# Patient Record
Sex: FEMALE | Race: WHITE | NOT HISPANIC OR LATINO
[De-identification: names, ages, dates, MRNs, and addresses within clinical notes are randomized per-mention and may not be internally consistent; named-entity substitution may affect disease eponyms.]

---

## 2017-12-21 ENCOUNTER — ASOB RESULT (OUTPATIENT)
Age: 52
End: 2017-12-21

## 2017-12-21 ENCOUNTER — APPOINTMENT (OUTPATIENT)
Dept: MATERNAL FETAL MEDICINE | Facility: CLINIC | Age: 52
End: 2017-12-21
Payer: COMMERCIAL

## 2017-12-21 PROCEDURE — 99241 OFFICE CONSULTATION NEW/ESTAB PATIENT 15 MIN: CPT

## 2018-01-08 ENCOUNTER — ASOB RESULT (OUTPATIENT)
Age: 53
End: 2018-01-08

## 2018-01-08 ENCOUNTER — APPOINTMENT (OUTPATIENT)
Dept: MATERNAL FETAL MEDICINE | Facility: CLINIC | Age: 53
End: 2018-01-08
Payer: COMMERCIAL

## 2018-01-08 PROCEDURE — 99241 OFFICE CONSULTATION NEW/ESTAB PATIENT 15 MIN: CPT

## 2018-01-29 ENCOUNTER — OUTPATIENT (OUTPATIENT)
Dept: OUTPATIENT SERVICES | Facility: HOSPITAL | Age: 53
LOS: 1 days | End: 2018-01-29
Payer: COMMERCIAL

## 2018-01-29 ENCOUNTER — TRANSCRIPTION ENCOUNTER (OUTPATIENT)
Age: 53
End: 2018-01-29

## 2018-01-29 DIAGNOSIS — Z12.11 ENCOUNTER FOR SCREENING FOR MALIGNANT NEOPLASM OF COLON: ICD-10-CM

## 2018-01-29 LAB — HCG UR QL: NEGATIVE — SIGNIFICANT CHANGE UP

## 2018-01-29 PROCEDURE — G0121: CPT

## 2018-01-29 PROCEDURE — 81025 URINE PREGNANCY TEST: CPT

## 2018-02-26 ENCOUNTER — OUTPATIENT (OUTPATIENT)
Dept: OUTPATIENT SERVICES | Facility: HOSPITAL | Age: 53
LOS: 1 days | End: 2018-02-26
Payer: COMMERCIAL

## 2018-02-26 VITALS
RESPIRATION RATE: 16 BRPM | SYSTOLIC BLOOD PRESSURE: 137 MMHG | TEMPERATURE: 99 F | HEART RATE: 73 BPM | DIASTOLIC BLOOD PRESSURE: 91 MMHG | WEIGHT: 293 LBS

## 2018-02-26 DIAGNOSIS — Z41.9 ENCOUNTER FOR PROCEDURE FOR PURPOSES OTHER THAN REMEDYING HEALTH STATE, UNSPECIFIED: Chronic | ICD-10-CM

## 2018-02-26 DIAGNOSIS — Z01.818 ENCOUNTER FOR OTHER PREPROCEDURAL EXAMINATION: ICD-10-CM

## 2018-02-26 DIAGNOSIS — N84.0 POLYP OF CORPUS UTERI: ICD-10-CM

## 2018-02-26 DIAGNOSIS — Z98.890 OTHER SPECIFIED POSTPROCEDURAL STATES: Chronic | ICD-10-CM

## 2018-02-26 DIAGNOSIS — Z98.891 HISTORY OF UTERINE SCAR FROM PREVIOUS SURGERY: Chronic | ICD-10-CM

## 2018-02-26 LAB
ALBUMIN SERPL ELPH-MCNC: 3.5 G/DL — SIGNIFICANT CHANGE UP (ref 3.3–5)
ALP SERPL-CCNC: 85 U/L — SIGNIFICANT CHANGE UP (ref 40–120)
ALT FLD-CCNC: 25 U/L — SIGNIFICANT CHANGE UP (ref 12–78)
ANION GAP SERPL CALC-SCNC: 6 MMOL/L — SIGNIFICANT CHANGE UP (ref 5–17)
AST SERPL-CCNC: 15 U/L — SIGNIFICANT CHANGE UP (ref 15–37)
BILIRUB SERPL-MCNC: 0.3 MG/DL — SIGNIFICANT CHANGE UP (ref 0.2–1.2)
BUN SERPL-MCNC: 16 MG/DL — SIGNIFICANT CHANGE UP (ref 7–23)
CALCIUM SERPL-MCNC: 9.2 MG/DL — SIGNIFICANT CHANGE UP (ref 8.5–10.1)
CHLORIDE SERPL-SCNC: 106 MMOL/L — SIGNIFICANT CHANGE UP (ref 96–108)
CO2 SERPL-SCNC: 29 MMOL/L — SIGNIFICANT CHANGE UP (ref 22–31)
CREAT SERPL-MCNC: 0.73 MG/DL — SIGNIFICANT CHANGE UP (ref 0.5–1.3)
GLUCOSE SERPL-MCNC: 106 MG/DL — HIGH (ref 70–99)
HBA1C BLD-MCNC: 5.6 % — SIGNIFICANT CHANGE UP (ref 4–5.6)
HCG UR QL: NEGATIVE — SIGNIFICANT CHANGE UP
HCT VFR BLD CALC: 43.2 % — SIGNIFICANT CHANGE UP (ref 34.5–45)
HGB BLD-MCNC: 13.8 G/DL — SIGNIFICANT CHANGE UP (ref 11.5–15.5)
MCHC RBC-ENTMCNC: 28.7 PG — SIGNIFICANT CHANGE UP (ref 27–34)
MCHC RBC-ENTMCNC: 31.9 GM/DL — LOW (ref 32–36)
MCV RBC AUTO: 90 FL — SIGNIFICANT CHANGE UP (ref 80–100)
PLATELET # BLD AUTO: 324 K/UL — SIGNIFICANT CHANGE UP (ref 150–400)
POTASSIUM SERPL-MCNC: 5 MMOL/L — SIGNIFICANT CHANGE UP (ref 3.5–5.3)
POTASSIUM SERPL-SCNC: 5 MMOL/L — SIGNIFICANT CHANGE UP (ref 3.5–5.3)
PROT SERPL-MCNC: 7.6 G/DL — SIGNIFICANT CHANGE UP (ref 6–8.3)
RBC # BLD: 4.8 M/UL — SIGNIFICANT CHANGE UP (ref 3.8–5.2)
RBC # FLD: 13.8 % — SIGNIFICANT CHANGE UP (ref 10.3–14.5)
SODIUM SERPL-SCNC: 141 MMOL/L — SIGNIFICANT CHANGE UP (ref 135–145)
WBC # BLD: 9.5 K/UL — SIGNIFICANT CHANGE UP (ref 3.8–10.5)
WBC # FLD AUTO: 9.5 K/UL — SIGNIFICANT CHANGE UP (ref 3.8–10.5)

## 2018-02-26 PROCEDURE — 86901 BLOOD TYPING SEROLOGIC RH(D): CPT

## 2018-02-26 PROCEDURE — 85027 COMPLETE CBC AUTOMATED: CPT

## 2018-02-26 PROCEDURE — 81025 URINE PREGNANCY TEST: CPT

## 2018-02-26 PROCEDURE — 93010 ELECTROCARDIOGRAM REPORT: CPT | Mod: NC

## 2018-02-26 PROCEDURE — 86900 BLOOD TYPING SEROLOGIC ABO: CPT

## 2018-02-26 PROCEDURE — G0463: CPT

## 2018-02-26 PROCEDURE — 80053 COMPREHEN METABOLIC PANEL: CPT

## 2018-02-26 PROCEDURE — 93005 ELECTROCARDIOGRAM TRACING: CPT

## 2018-02-26 PROCEDURE — 86850 RBC ANTIBODY SCREEN: CPT

## 2018-02-26 PROCEDURE — 83036 HEMOGLOBIN GLYCOSYLATED A1C: CPT

## 2018-02-26 RX ORDER — METFORMIN HYDROCHLORIDE 850 MG/1
0 TABLET ORAL
Qty: 180 | Refills: 0 | COMMUNITY

## 2018-02-26 RX ORDER — LEVOTHYROXINE SODIUM 125 MCG
0 TABLET ORAL
Qty: 90 | Refills: 0 | COMMUNITY

## 2018-02-26 NOTE — H&P PST ADULT - PSH
Elective surgery  (Right big toe joint replacement, )  History of colonoscopy  (2018)  S/P  section  ()

## 2018-02-26 NOTE — H&P PST ADULT - FAMILY HISTORY
Mother  Still living? No  Family history of ovarian cancer, Age at diagnosis: Age Unknown     Father  Still living? No  MI (myocardial infarction), Age at diagnosis: Age Unknown

## 2018-02-26 NOTE — H&P PST ADULT - NEGATIVE CARDIOVASCULAR SYMPTOMS
no chest pain/no paroxysmal nocturnal dyspnea/no orthopnea/no peripheral edema/no palpitations/no dyspnea on exertion/no claudication

## 2018-02-26 NOTE — H&P PST ADULT - LYMPHATIC
supraclavicular L/anterior cervical R/posterior cervical R/posterior cervical L/anterior cervical L/supraclavicular R

## 2018-02-26 NOTE — H&P PST ADULT - HISTORY OF PRESENT ILLNESS
52yo female with PMH of hypothyroidism and Type 2 DM here for PST. Pt complaining of vaginal staining for a day or two after having her monthly menses for the last year. Pt denies n/v/d, abdominal and pelvic pain. LMP - 2/5/18. Pt s/p sonogram "which showed polyps in uterus". Pt electing for dilation and curettage/hysteroscopy with myosure on 3/2/18. 54yo female with PMH of hypothyroidism and Prediabetes here for PST. Pt complaining of vaginal staining for a day or two after having her monthly menses for the last year. Pt denies n/v/d, abdominal and pelvic pain. LMP - 2/5/18. Pt s/p sonogram "which showed polyps in uterus". Pt electing for dilation and curettage/hysteroscopy with myosure on 3/2/18.

## 2018-02-26 NOTE — H&P PST ADULT - SKIN/BREAST COMMENTS
Pt with yeast rash on for the last 2 days. Pt with yeast rash on bilateral lower abdomen in between skin folds for the last 2 days. Pt using Clotrimazole ointment to affected areas. Instructed pt to inform PCP and surgeon regarding rash.

## 2018-02-26 NOTE — H&P PST ADULT - ATTENDING COMMENTS
See my Pre Op Note.     A: irreg bleeding, intrauterine polypoid lesion    P: For Hysteroscoy, myosure, and D and C.

## 2018-02-26 NOTE — H&P PST ADULT - GASTROINTESTINAL DETAILS
no bruit/no rebound tenderness/no rigidity/soft/no guarding/no masses palpable/bowel sounds normal/nontender/normal/no distention/no organomegaly

## 2018-02-26 NOTE — H&P PST ADULT - PROBLEM SELECTOR PLAN 2
Medical clearance needed. CBC, Comprehensive panel, T&S, UCG and EKG ordered. Pre-op instructions given and pt verbalized understanding. Medical clearance needed. CBC, Comprehensive panel, T&S, HgbA1c, UCG and EKG ordered. Pre-op instructions given and pt verbalized understanding. Medical clearance needed. CBC, Comprehensive panel, T&S, HgbA1c, UCG and EKG ordered. Pre-op instructions given and pt verbalized understanding.    Pt with yeast rash on bilateral lower abdomen in between skin folds for the last 2 days. Pt using Clotrimazole ointment to affected areas. Instructed pt to inform PCP and surgeon regarding rash. Pt verbalized understanding.

## 2018-02-26 NOTE — H&P PST ADULT - PMH
Hypothyroidism    OA (osteoarthritis)    Prediabetes Hypothyroidism    OA (osteoarthritis)    Polyp of corpus uteri    Prediabetes

## 2018-02-26 NOTE — H&P PST ADULT - NSANTHOSAYNRD_GEN_A_CORE
No. ANISA screening performed.  STOP BANG Legend: 0-2 = LOW Risk; 3-4 = INTERMEDIATE Risk; 5-8 = HIGH Risk

## 2018-02-28 RX ORDER — SODIUM CHLORIDE 9 MG/ML
1000 INJECTION, SOLUTION INTRAVENOUS
Qty: 0 | Refills: 0 | Status: DISCONTINUED | OUTPATIENT
Start: 2018-03-02 | End: 2018-03-02

## 2018-03-02 ENCOUNTER — TRANSCRIPTION ENCOUNTER (OUTPATIENT)
Age: 53
End: 2018-03-02

## 2018-03-02 ENCOUNTER — OUTPATIENT (OUTPATIENT)
Dept: OUTPATIENT SERVICES | Facility: HOSPITAL | Age: 53
LOS: 1 days | End: 2018-03-02
Payer: COMMERCIAL

## 2018-03-02 ENCOUNTER — RESULT REVIEW (OUTPATIENT)
Age: 53
End: 2018-03-02

## 2018-03-02 VITALS
SYSTOLIC BLOOD PRESSURE: 140 MMHG | HEIGHT: 65 IN | HEART RATE: 84 BPM | WEIGHT: 293 LBS | TEMPERATURE: 98 F | DIASTOLIC BLOOD PRESSURE: 79 MMHG | RESPIRATION RATE: 16 BRPM | OXYGEN SATURATION: 98 %

## 2018-03-02 VITALS
OXYGEN SATURATION: 98 % | RESPIRATION RATE: 16 BRPM | HEART RATE: 75 BPM | SYSTOLIC BLOOD PRESSURE: 132 MMHG | DIASTOLIC BLOOD PRESSURE: 80 MMHG | TEMPERATURE: 98 F

## 2018-03-02 DIAGNOSIS — Z98.890 OTHER SPECIFIED POSTPROCEDURAL STATES: Chronic | ICD-10-CM

## 2018-03-02 DIAGNOSIS — N84.0 POLYP OF CORPUS UTERI: ICD-10-CM

## 2018-03-02 DIAGNOSIS — Z41.9 ENCOUNTER FOR PROCEDURE FOR PURPOSES OTHER THAN REMEDYING HEALTH STATE, UNSPECIFIED: Chronic | ICD-10-CM

## 2018-03-02 DIAGNOSIS — Z98.891 HISTORY OF UTERINE SCAR FROM PREVIOUS SURGERY: Chronic | ICD-10-CM

## 2018-03-02 PROCEDURE — 58558 HYSTEROSCOPY BIOPSY: CPT

## 2018-03-02 PROCEDURE — 88305 TISSUE EXAM BY PATHOLOGIST: CPT | Mod: 26

## 2018-03-02 PROCEDURE — 88305 TISSUE EXAM BY PATHOLOGIST: CPT

## 2018-03-02 RX ORDER — HYDROMORPHONE HYDROCHLORIDE 2 MG/ML
0.5 INJECTION INTRAMUSCULAR; INTRAVENOUS; SUBCUTANEOUS
Qty: 0 | Refills: 0 | Status: DISCONTINUED | OUTPATIENT
Start: 2018-03-02 | End: 2018-03-02

## 2018-03-02 RX ORDER — SODIUM CHLORIDE 9 MG/ML
1000 INJECTION, SOLUTION INTRAVENOUS
Qty: 0 | Refills: 0 | Status: DISCONTINUED | OUTPATIENT
Start: 2018-03-02 | End: 2018-03-02

## 2018-03-02 RX ADMIN — SODIUM CHLORIDE 100 MILLILITER(S): 9 INJECTION, SOLUTION INTRAVENOUS at 11:11

## 2018-03-02 RX ADMIN — SODIUM CHLORIDE 75 MILLILITER(S): 9 INJECTION, SOLUTION INTRAVENOUS at 08:52

## 2018-03-02 NOTE — BRIEF OPERATIVE NOTE - PROCEDURE
<<-----Click on this checkbox to enter Procedure Hysteroscopy with dilation and curettage of uterus using MyoSure device  03/02/2018    Active  [id number]

## 2018-03-02 NOTE — ASU DISCHARGE PLAN (ADULT/PEDIATRIC). - MEDICATION SUMMARY - MEDICATIONS TO TAKE
I will START or STAY ON the medications listed below when I get home from the hospital:    Advil 200 mg oral tablet  -- 1 tab(s) by mouth every 6 hours, As Needed  -- Indication: For home meds    METFORMIN HCL  MG TABLET  -- orally once a day after dinner.   -- Indication: For home meds    clotrimazole 1% topical cream  -- Apply on skin to affected area 2 times a day  -- Indication: For home meds    METHOCARBAMOL 750 MG TABLET  -- Indication: For home meds    LEVOTHYROXINE 175 MCG TABLET  -- orally once a day  -- Indication: For home meds    Calcium 600+D oral tablet  -- orally once a day  -- Indication: For home meds    Multiple Vitamins oral tablet  -- 1 tab(s) by mouth once a day  -- Indication: For home meds    Vitamin C 500 mg oral tablet  -- 1 tab(s) by mouth once a day  -- Indication: For home meds

## 2018-03-02 NOTE — BRIEF OPERATIVE NOTE - OPERATION/FINDINGS
EUA revealed very long vagina and large rectocele, about 2nd-3rd degree, but large in size, perhaps 4 X 4 cm. This interfered, along with the length of the vagina, in visualization. Slowly progressing, we had to swap out a tower due to a non functioning camera/similar, thus providing delay. The case showed a 1.5 cm polyp near the anterior aspect of the fundus near the right ostium; and another polypoid lesion near the right ostium with ? of abnormal vessels, removed with Myosure, as was the other polyp. Another cx polyp was removed as well. ECC small. EMC small. All counts correct x 3. No complications noted. EBL 20 cc. fluids with 50 cc mismatch.

## 2018-03-02 NOTE — ASU DISCHARGE PLAN (ADULT/PEDIATRIC). - NOTIFY
Bleeding that does not stop/Increased Irritability or Sluggishness/Persistent Nausea and Vomiting/Numbness, tingling/Inability to Tolerate Liquids or Foods/Excessive Diarrhea/Unable to Urinate Persistent Nausea and Vomiting/Unable to Urinate/Bleeding that does not stop/Increased Irritability or Sluggishness/GYN Fever>100.4/Inability to Tolerate Liquids or Foods

## 2018-03-02 NOTE — ASU DISCHARGE PLAN (ADULT/PEDIATRIC). - PAIN
Advil 3 tabs every 6 hours for cramps as needed. Advil 3 tabs every 6 hours for cramps as needed./n/a

## 2018-03-05 LAB — SURGICAL PATHOLOGY FINAL REPORT - CH: SIGNIFICANT CHANGE UP

## 2019-09-27 ENCOUNTER — OUTPATIENT (OUTPATIENT)
Dept: OUTPATIENT SERVICES | Facility: HOSPITAL | Age: 54
LOS: 1 days | End: 2019-09-27
Payer: COMMERCIAL

## 2019-09-27 DIAGNOSIS — Z98.891 HISTORY OF UTERINE SCAR FROM PREVIOUS SURGERY: Chronic | ICD-10-CM

## 2019-09-27 DIAGNOSIS — Z41.9 ENCOUNTER FOR PROCEDURE FOR PURPOSES OTHER THAN REMEDYING HEALTH STATE, UNSPECIFIED: Chronic | ICD-10-CM

## 2019-09-27 DIAGNOSIS — Z98.890 OTHER SPECIFIED POSTPROCEDURAL STATES: Chronic | ICD-10-CM

## 2019-09-27 PROCEDURE — 93010 ELECTROCARDIOGRAM REPORT: CPT

## 2019-10-03 PROBLEM — M19.90 UNSPECIFIED OSTEOARTHRITIS, UNSPECIFIED SITE: Chronic | Status: ACTIVE | Noted: 2018-02-26

## 2019-10-03 PROBLEM — N84.0 POLYP OF CORPUS UTERI: Chronic | Status: ACTIVE | Noted: 2018-02-26

## 2019-10-03 PROBLEM — R73.03 PREDIABETES: Chronic | Status: ACTIVE | Noted: 2018-02-26

## 2019-10-03 PROBLEM — E03.9 HYPOTHYROIDISM, UNSPECIFIED: Chronic | Status: ACTIVE | Noted: 2018-02-26

## 2019-10-16 ENCOUNTER — NON-APPOINTMENT (OUTPATIENT)
Age: 54
End: 2019-10-16

## 2019-10-16 ENCOUNTER — APPOINTMENT (OUTPATIENT)
Dept: CARDIOLOGY | Facility: CLINIC | Age: 54
End: 2019-10-16
Payer: COMMERCIAL

## 2019-10-16 VITALS
WEIGHT: 210 LBS | HEART RATE: 61 BPM | OXYGEN SATURATION: 100 % | DIASTOLIC BLOOD PRESSURE: 85 MMHG | HEIGHT: 64 IN | BODY MASS INDEX: 35.85 KG/M2 | SYSTOLIC BLOOD PRESSURE: 135 MMHG

## 2019-10-16 DIAGNOSIS — R94.31 ABNORMAL ELECTROCARDIOGRAM [ECG] [EKG]: ICD-10-CM

## 2019-10-16 DIAGNOSIS — Z86.79 PERSONAL HISTORY OF OTHER DISEASES OF THE CIRCULATORY SYSTEM: ICD-10-CM

## 2019-10-16 DIAGNOSIS — G47.33 OBSTRUCTIVE SLEEP APNEA (ADULT) (PEDIATRIC): ICD-10-CM

## 2019-10-16 DIAGNOSIS — Z82.49 FAMILY HISTORY OF ISCHEMIC HEART DISEASE AND OTHER DISEASES OF THE CIRCULATORY SYSTEM: ICD-10-CM

## 2019-10-16 DIAGNOSIS — N84.1 POLYP OF CERVIX UTERI: ICD-10-CM

## 2019-10-16 DIAGNOSIS — Z80.41 FAMILY HISTORY OF MALIGNANT NEOPLASM OF OVARY: ICD-10-CM

## 2019-10-16 PROCEDURE — 93000 ELECTROCARDIOGRAM COMPLETE: CPT

## 2019-10-16 PROCEDURE — 99244 OFF/OP CNSLTJ NEW/EST MOD 40: CPT

## 2019-10-16 NOTE — PHYSICAL EXAM
[General Appearance - Well Developed] : well developed [Normal Appearance] : normal appearance [No Deformities] : no deformities [General Appearance - Well Nourished] : well nourished [Well Groomed] : well groomed [General Appearance - In No Acute Distress] : no acute distress [Normal Conjunctiva] : the conjunctiva exhibited no abnormalities [Normal Oral Mucosa] : normal oral mucosa [No Oral Pallor] : no oral pallor [Eyelids - No Xanthelasma] : the eyelids demonstrated no xanthelasmas [Normal Jugular Venous V Waves Present] : normal jugular venous V waves present [Normal Jugular Venous A Waves Present] : normal jugular venous A waves present [No Oral Cyanosis] : no oral cyanosis [No Jugular Venous Myles A Waves] : no jugular venous myles A waves [Normal Rate] : normal [Normal S2] : normal S2 [Normal S1] : normal S1 [S3] : no S3 [S4] : no S4 [Right Carotid Bruit] : no bruit heard over the right carotid [Left Carotid Bruit] : no bruit heard over the left carotid [Right Femoral Bruit] : no bruit heard over the right femoral artery [Left Femoral Bruit] : no bruit heard over the left femoral artery [2+] : left 2+ [No Pitting Edema] : no pitting edema present [No Abnormalities] : the abdominal aorta was not enlarged and no bruit was heard [Respiration, Rhythm And Depth] : normal respiratory rhythm and effort [Abdomen Soft] : soft [Exaggerated Use Of Accessory Muscles For Inspiration] : no accessory muscle use [Auscultation Breath Sounds / Voice Sounds] : lungs were clear to auscultation bilaterally [Abdomen Tenderness] : non-tender [Abdomen Mass (___ Cm)] : no abdominal mass palpated [Nail Clubbing] : no clubbing of the fingernails [Cyanosis, Localized] : no localized cyanosis [FreeTextEntry1] : with cane [Petechial Hemorrhages (___cm)] : no petechial hemorrhages [Skin Color & Pigmentation] : normal skin color and pigmentation [No Venous Stasis] : no venous stasis [] : no rash [No Xanthoma] : no  xanthoma was observed [Skin Lesions] : no skin lesions [No Skin Ulcers] : no skin ulcer [Mood] : the mood was normal [Affect] : the affect was normal [Oriented To Time, Place, And Person] : oriented to person, place, and time [No Anxiety] : not feeling anxious

## 2019-10-16 NOTE — DISCUSSION/SUMMARY
[FreeTextEntry1] : Bernie is a 54 year old female here for evaluation.\par \par She has no worrisome cardiac symptoms at this time, or history of coronary artery disease. She had a pharmacologic nuclear stress test in 2/2018 that was unremarkable.\par \par Her blood pressure is near goal. Her physical exam is unremarkable. Her EKG demonstrates a sinus rhythm without obvious ischemia or chamber enlargement. At current time, there is no evidence of acute ischemia, decompensated heart failure, critical valvular disease, or significant arrhythmias. There is no cardiac contraindication to proceeding with left hip surgery. No further testing is necessary. Routine cardiac monitoring is recommended. We also discussed the importance of diet and exercise, to reduce her cholesterol and overall cardiovascular risk. She will followup with me in 3-6 months time.

## 2019-10-21 ENCOUNTER — INPATIENT (INPATIENT)
Facility: HOSPITAL | Age: 54
LOS: 1 days | Discharge: HOME HEALTH | End: 2019-10-23
Payer: COMMERCIAL

## 2019-10-21 ENCOUNTER — OUTPATIENT (OUTPATIENT)
Dept: OUTPATIENT SERVICES | Facility: HOSPITAL | Age: 54
LOS: 1 days | End: 2019-10-21

## 2019-10-21 DIAGNOSIS — Z98.891 HISTORY OF UTERINE SCAR FROM PREVIOUS SURGERY: Chronic | ICD-10-CM

## 2019-10-21 DIAGNOSIS — Z98.890 OTHER SPECIFIED POSTPROCEDURAL STATES: Chronic | ICD-10-CM

## 2019-10-21 DIAGNOSIS — Z41.9 ENCOUNTER FOR PROCEDURE FOR PURPOSES OTHER THAN REMEDYING HEALTH STATE, UNSPECIFIED: Chronic | ICD-10-CM

## 2019-10-21 PROCEDURE — 73501 X-RAY EXAM HIP UNI 1 VIEW: CPT | Mod: 26,LT

## 2019-10-22 ENCOUNTER — OUTPATIENT (OUTPATIENT)
Dept: OUTPATIENT SERVICES | Facility: HOSPITAL | Age: 54
LOS: 1 days | End: 2019-10-22

## 2019-10-22 DIAGNOSIS — Z41.9 ENCOUNTER FOR PROCEDURE FOR PURPOSES OTHER THAN REMEDYING HEALTH STATE, UNSPECIFIED: Chronic | ICD-10-CM

## 2019-10-22 DIAGNOSIS — Z98.891 HISTORY OF UTERINE SCAR FROM PREVIOUS SURGERY: Chronic | ICD-10-CM

## 2019-10-22 DIAGNOSIS — Z98.890 OTHER SPECIFIED POSTPROCEDURAL STATES: Chronic | ICD-10-CM

## 2019-10-23 ENCOUNTER — OUTPATIENT (OUTPATIENT)
Dept: OUTPATIENT SERVICES | Facility: HOSPITAL | Age: 54
LOS: 1 days | End: 2019-10-23

## 2019-10-23 DIAGNOSIS — Z98.891 HISTORY OF UTERINE SCAR FROM PREVIOUS SURGERY: Chronic | ICD-10-CM

## 2019-10-23 DIAGNOSIS — Z98.890 OTHER SPECIFIED POSTPROCEDURAL STATES: Chronic | ICD-10-CM

## 2019-10-23 DIAGNOSIS — Z41.9 ENCOUNTER FOR PROCEDURE FOR PURPOSES OTHER THAN REMEDYING HEALTH STATE, UNSPECIFIED: Chronic | ICD-10-CM

## 2021-04-01 NOTE — H&P PST ADULT - TEACHING/LEARNING DEVELOPMENTAL CONSIDERATIONS
Patient called back and left a voicemail and asked for it to NOT go to [facility] and to be sent through [facility] in [facility] instead 
Patient only has 1 pill left of premarin and is wondering if we could refill about a month supply to the [facility] on [address] in [place] and then send the rest of the refills [facility] 
none

## 2021-08-13 ENCOUNTER — EMERGENCY (EMERGENCY)
Facility: HOSPITAL | Age: 56
LOS: 1 days | Discharge: HOME | End: 2021-08-13
Attending: EMERGENCY MEDICINE | Admitting: EMERGENCY MEDICINE
Payer: COMMERCIAL

## 2021-08-13 VITALS
OXYGEN SATURATION: 98 % | HEIGHT: 65 IN | HEART RATE: 74 BPM | RESPIRATION RATE: 983 BRPM | WEIGHT: 229.94 LBS | TEMPERATURE: 98 F | SYSTOLIC BLOOD PRESSURE: 175 MMHG | DIASTOLIC BLOOD PRESSURE: 95 MMHG

## 2021-08-13 VITALS
HEART RATE: 66 BPM | RESPIRATION RATE: 15 BRPM | OXYGEN SATURATION: 99 % | SYSTOLIC BLOOD PRESSURE: 128 MMHG | TEMPERATURE: 98 F | DIASTOLIC BLOOD PRESSURE: 78 MMHG

## 2021-08-13 DIAGNOSIS — Z98.891 HISTORY OF UTERINE SCAR FROM PREVIOUS SURGERY: Chronic | ICD-10-CM

## 2021-08-13 DIAGNOSIS — Z98.890 OTHER SPECIFIED POSTPROCEDURAL STATES: Chronic | ICD-10-CM

## 2021-08-13 DIAGNOSIS — Z41.9 ENCOUNTER FOR PROCEDURE FOR PURPOSES OTHER THAN REMEDYING HEALTH STATE, UNSPECIFIED: Chronic | ICD-10-CM

## 2021-08-13 LAB
ALBUMIN SERPL ELPH-MCNC: 3.5 G/DL — SIGNIFICANT CHANGE UP (ref 3.3–5)
ALP SERPL-CCNC: 96 U/L — SIGNIFICANT CHANGE UP (ref 40–120)
ALT FLD-CCNC: 27 U/L — SIGNIFICANT CHANGE UP (ref 12–78)
ANION GAP SERPL CALC-SCNC: 8 MMOL/L — SIGNIFICANT CHANGE UP (ref 5–17)
APTT BLD: 29.2 SEC — SIGNIFICANT CHANGE UP (ref 27.5–35.5)
AST SERPL-CCNC: 29 U/L — SIGNIFICANT CHANGE UP (ref 15–37)
BASOPHILS # BLD AUTO: 0.05 K/UL — SIGNIFICANT CHANGE UP (ref 0–0.2)
BASOPHILS NFR BLD AUTO: 0.7 % — SIGNIFICANT CHANGE UP (ref 0–2)
BILIRUB SERPL-MCNC: 0.6 MG/DL — SIGNIFICANT CHANGE UP (ref 0.2–1.2)
BUN SERPL-MCNC: 15 MG/DL — SIGNIFICANT CHANGE UP (ref 7–23)
CALCIUM SERPL-MCNC: 9.2 MG/DL — SIGNIFICANT CHANGE UP (ref 8.5–10.1)
CHLORIDE SERPL-SCNC: 106 MMOL/L — SIGNIFICANT CHANGE UP (ref 96–108)
CO2 SERPL-SCNC: 27 MMOL/L — SIGNIFICANT CHANGE UP (ref 22–31)
CREAT SERPL-MCNC: 0.61 MG/DL — SIGNIFICANT CHANGE UP (ref 0.5–1.3)
EOSINOPHIL # BLD AUTO: 0.23 K/UL — SIGNIFICANT CHANGE UP (ref 0–0.5)
EOSINOPHIL NFR BLD AUTO: 3.2 % — SIGNIFICANT CHANGE UP (ref 0–6)
GLUCOSE SERPL-MCNC: 90 MG/DL — SIGNIFICANT CHANGE UP (ref 70–99)
HCT VFR BLD CALC: 42.7 % — SIGNIFICANT CHANGE UP (ref 34.5–45)
HGB BLD-MCNC: 13.8 G/DL — SIGNIFICANT CHANGE UP (ref 11.5–15.5)
IMM GRANULOCYTES NFR BLD AUTO: 0.4 % — SIGNIFICANT CHANGE UP (ref 0–1.5)
INR BLD: 0.96 RATIO — SIGNIFICANT CHANGE UP (ref 0.88–1.16)
LYMPHOCYTES # BLD AUTO: 2.05 K/UL — SIGNIFICANT CHANGE UP (ref 1–3.3)
LYMPHOCYTES # BLD AUTO: 28.2 % — SIGNIFICANT CHANGE UP (ref 13–44)
MCHC RBC-ENTMCNC: 30.2 PG — SIGNIFICANT CHANGE UP (ref 27–34)
MCHC RBC-ENTMCNC: 32.3 GM/DL — SIGNIFICANT CHANGE UP (ref 32–36)
MCV RBC AUTO: 93.4 FL — SIGNIFICANT CHANGE UP (ref 80–100)
MONOCYTES # BLD AUTO: 0.57 K/UL — SIGNIFICANT CHANGE UP (ref 0–0.9)
MONOCYTES NFR BLD AUTO: 7.8 % — SIGNIFICANT CHANGE UP (ref 2–14)
NEUTROPHILS # BLD AUTO: 4.34 K/UL — SIGNIFICANT CHANGE UP (ref 1.8–7.4)
NEUTROPHILS NFR BLD AUTO: 59.7 % — SIGNIFICANT CHANGE UP (ref 43–77)
NRBC # BLD: 0 /100 WBCS — SIGNIFICANT CHANGE UP (ref 0–0)
PLATELET # BLD AUTO: 260 K/UL — SIGNIFICANT CHANGE UP (ref 150–400)
POTASSIUM SERPL-MCNC: 4.3 MMOL/L — SIGNIFICANT CHANGE UP (ref 3.5–5.3)
POTASSIUM SERPL-SCNC: 4.3 MMOL/L — SIGNIFICANT CHANGE UP (ref 3.5–5.3)
PROT SERPL-MCNC: 7.7 G/DL — SIGNIFICANT CHANGE UP (ref 6–8.3)
PROTHROM AB SERPL-ACNC: 11.3 SEC — SIGNIFICANT CHANGE UP (ref 10.6–13.6)
RBC # BLD: 4.57 M/UL — SIGNIFICANT CHANGE UP (ref 3.8–5.2)
RBC # FLD: 14 % — SIGNIFICANT CHANGE UP (ref 10.3–14.5)
SARS-COV-2 RNA SPEC QL NAA+PROBE: SIGNIFICANT CHANGE UP
SODIUM SERPL-SCNC: 141 MMOL/L — SIGNIFICANT CHANGE UP (ref 135–145)
TROPONIN I SERPL-MCNC: <.015 NG/ML — SIGNIFICANT CHANGE UP (ref 0.01–0.04)
WBC # BLD: 7.27 K/UL — SIGNIFICANT CHANGE UP (ref 3.8–10.5)
WBC # FLD AUTO: 7.27 K/UL — SIGNIFICANT CHANGE UP (ref 3.8–10.5)

## 2021-08-13 PROCEDURE — U0003: CPT

## 2021-08-13 PROCEDURE — 93010 ELECTROCARDIOGRAM REPORT: CPT

## 2021-08-13 PROCEDURE — 70496 CT ANGIOGRAPHY HEAD: CPT | Mod: 26,MA

## 2021-08-13 PROCEDURE — 70498 CT ANGIOGRAPHY NECK: CPT | Mod: MA

## 2021-08-13 PROCEDURE — 85610 PROTHROMBIN TIME: CPT

## 2021-08-13 PROCEDURE — 93005 ELECTROCARDIOGRAM TRACING: CPT

## 2021-08-13 PROCEDURE — 70450 CT HEAD/BRAIN W/O DYE: CPT | Mod: MA

## 2021-08-13 PROCEDURE — 80053 COMPREHEN METABOLIC PANEL: CPT

## 2021-08-13 PROCEDURE — 82962 GLUCOSE BLOOD TEST: CPT

## 2021-08-13 PROCEDURE — 71045 X-RAY EXAM CHEST 1 VIEW: CPT | Mod: 26

## 2021-08-13 PROCEDURE — 85025 COMPLETE CBC W/AUTO DIFF WBC: CPT

## 2021-08-13 PROCEDURE — 99285 EMERGENCY DEPT VISIT HI MDM: CPT | Mod: 25

## 2021-08-13 PROCEDURE — 70496 CT ANGIOGRAPHY HEAD: CPT | Mod: MA

## 2021-08-13 PROCEDURE — 36415 COLL VENOUS BLD VENIPUNCTURE: CPT

## 2021-08-13 PROCEDURE — 84484 ASSAY OF TROPONIN QUANT: CPT

## 2021-08-13 PROCEDURE — 0042T: CPT

## 2021-08-13 PROCEDURE — 71045 X-RAY EXAM CHEST 1 VIEW: CPT

## 2021-08-13 PROCEDURE — 85730 THROMBOPLASTIN TIME PARTIAL: CPT

## 2021-08-13 PROCEDURE — 99285 EMERGENCY DEPT VISIT HI MDM: CPT

## 2021-08-13 PROCEDURE — 70498 CT ANGIOGRAPHY NECK: CPT | Mod: 26,MA

## 2021-08-13 PROCEDURE — U0005: CPT

## 2021-08-13 NOTE — ED ADULT NURSE NOTE - OBJECTIVE STATEMENT
Pt A&Ox4, ambulatory to ED c/o numbness and headache.  Pt states that she had a headache to the left side of her head this morning after some numbness to her forehead.  She had nausea and numbness to left arm.  Pt also had "difficulty focusing" with dizziness.  Pt states that last night her  told her she was "off" and "lethargic" and her daughter mentioned to her at one point that she looked "as if my eyes were going to roll back in my head."

## 2021-08-13 NOTE — ED ADULT NURSE NOTE - NSICDXPASTSURGICALHX_GEN_ALL_CORE_FT
PAST SURGICAL HISTORY:  Elective surgery (Right big toe joint replacement, )    History of colonoscopy (2018)    S/P  section ()

## 2021-08-13 NOTE — ED ADULT NURSE NOTE - CHPI ED NUR SYMPTOMS NEG
FAMILY PRACTICE HEALTH MAINTENANCE OFFICE VISIT  [facility] - [address]    NAME: Garo Kasper  AGE: 62 y o  SEX: female  : 1960     DATE: 2020    Assessment and Plan     1  Well adult exam    2  Mixed hyperlipidemia  -     Lipid Panel with Direct LDL reflex; Future    3  Benign essential hypertension  -     Comprehensive metabolic panel; Future    4  Hypothyroidism, unspecified type    5  Need for hepatitis C screening test  -     Hepatitis C antibody; Future    6  Need for vaccination  -     Zoster Vaccine Recombinant IM    7  Angiomyolipoma of both kidneys        · Patient Counseling:   · Nutrition: Stressed importance of a well balanced diet, moderation of sodium/saturated fat, caloric balance and sufficient intake of fiber  · Exercise: Stressed the importance of regular exercise with a goal of 150 minutes per week  · Dental Health: Discussed daily flossing and brushing and regular dental visits     · Immunizations reviewed: See Orders  · Discussed benefits of:  Colon Cancer Screening, Mammogram , Cervical Cancer screening and Screening labs   BMI Counseling: Body mass index is 24 96 kg/m²  Discussed with patient's BMI with her  The BMI is acceptable    Return in about 6 months (around 2021) for Recheck          Chief Complaint     Chief Complaint   Patient presents with    Physical Exam     [facility]       History of Present Illness     Pt is here for a full physical  Pt has an OBGYN and sees him and is up to date      Well Adult Physical   Patient here for a comprehensive physical exam       Diet and Physical Activity  Diet: well balanced diet  Exercise: occasionally      Depression Screen  PHQ-9 Depression Screening    PHQ-9:    Frequency of the following problems over the past two weeks:       Little interest or pleasure in doing things:  0 - not at all  Feeling down, depressed, or hopeless:  0 - not at all  PHQ-2 Score:  0          General Health  Hearing: Normal:  bilateral  Vision: wears glasses  Dental: regular dental visits    Reproductive Health  No issues       The following portions of the patient's history were reviewed and updated as appropriate: allergies, current medications, past family history, past medical history, past social history, past surgical history and problem list     Review of Systems     Review of Systems   Constitutional: Negative  Negative for activity change, appetite change, chills, diaphoresis and fatigue  HENT: Negative  Negative for dental problem, ear pain, sinus pressure and sore throat  Eyes: Negative  Negative for photophobia, pain, discharge, redness, itching and visual disturbance  Respiratory: Negative for apnea and chest tightness  Cardiovascular: Negative  Negative for chest pain, palpitations and leg swelling  Gastrointestinal: Negative  Negative for abdominal distention, abdominal pain, constipation and diarrhea  Endocrine: Negative  Negative for cold intolerance and heat intolerance  Genitourinary: Negative  Negative for difficulty urinating and dyspareunia  Musculoskeletal: Negative  Negative for arthralgias and back pain  Skin: Negative  Allergic/Immunologic: Negative for environmental allergies  Neurological: Negative  Negative for dizziness  Psychiatric/Behavioral: Negative  Negative for agitation  Past Medical History     Past Medical History:   Diagnosis Date    [facility] 1995       Past Surgical History     History reviewed  No pertinent surgical history      Social History     Social History     Socioeconomic History    Marital status: /Civil Union     Spouse name: None    Number of children: None    Years of education: None    Highest education level: None   Occupational History    None   Social Needs    Financial resource strain: None    Food insecurity:     Worry: None     Inability: None    Transportation needs:     Medical: None     Non-medical: None   Tobacco Use    Smoking status: Never Smoker    Smokeless tobacco: Never Used   Substance and Sexual Activity    Alcohol use: None    Drug use: None    Sexual activity: None   Lifestyle    Physical activity:     Days per week: None     Minutes per session: None    Stress: None   Relationships    Social connections:     Talks on phone: None     Gets together: None     Attends Anabaptist service: None     Active member of club or organization: None     Attends meetings of clubs or organizations: None     Relationship status: None    Intimate partner violence:     Fear of current or ex partner: None     Emotionally abused: None     Physically abused: None     Forced sexual activity: None   Other Topics Concern    None   Social History Narrative    None       Family History     Family History   Problem Relation Age of Onset    Lung cancer Mother     Alzheimer's disease Paternal Aunt     Mental illness Neg Hx        Current Medications       Current Outpatient Medications:     amlodipine-olmesartan (CALVIN) 5-20 MG, TAKE 1 TABLET DAILY, Disp: 90 tablet, Rfl: 3    aspirin (ECOTRIN LOW STRENGTH) 81 mg EC tablet, Take 81 mg by mouth daily, Disp: , Rfl:     levothyroxine 112 mcg tablet, Take 112 mcg by mouth daily, Disp: , Rfl:     norethindrone-ethinyl estradiol (FEMHRT 1/5) 1-5 MG-MCG TABS, , Disp: , Rfl:     levothyroxine (SYNTHROID) 125 mcg tablet, Take by mouth daily, Disp: , Rfl:      Allergies     No Known Allergies    Objective     /70   Pulse 62   Temp 98 3 °F (36 8 °C)   Resp 16   Ht 5' 5" (1 651 m)   Wt 68 kg (150 lb)   BMI 24 96 kg/m²      Physical Exam   Constitutional: She appears well-developed and well-nourished  No distress  HENT:   Head: Normocephalic and atraumatic  Right Ear: External ear normal    Left Ear: External ear normal    Nose: Nose normal    Mouth/Throat: Oropharynx is clear and moist  No oropharyngeal exudate     Eyes: Pupils are equal, round, and reactive to light  EOM are normal  Right eye exhibits no discharge  Left eye exhibits no discharge  No scleral icterus  Neck: No thyromegaly present  Cardiovascular: Normal rate and normal heart sounds  No murmur heard  Pulmonary/Chest: Effort normal and breath sounds normal  No respiratory distress  She has no wheezes  Abdominal: Soft  Bowel sounds are normal  She exhibits no distension and no mass  There is no tenderness  There is no rebound and no guarding  Musculoskeletal: Normal range of motion  Neurological: She is alert  She displays normal reflexes  Coordination normal    Skin: Skin is warm and dry  No rash noted  She is not diaphoretic  No erythema  Psychiatric: She has a normal mood and affect  Her behavior is normal    Nursing note and vitals reviewed           Visual Acuity Screening    Right eye Left eye Both eyes   Without correction:      With correction: 20/25 20/25 [address], [address] no blurred vision/no change in level of consciousness/no confusion/no fever/no loss of consciousness/no vomiting/no weakness

## 2021-08-13 NOTE — ED PROVIDER NOTE - OBJECTIVE STATEMENT
Pt is a 57 yo female with pmhx of Hypothyroidism OA (osteoarthritis) Polyp of corpus uteri Prediabetes c/o of left side temporal headache and left side facial numbness which started at 10:30 am while at work. Pt states she had difficulty focusing and lost balance and almost fell over to right side. Pt states symptoms resolved. Pt denies any chest pain sob nvd fever cough.     pcp none

## 2021-08-13 NOTE — ED PROVIDER NOTE - NSFOLLOWUPINSTRUCTIONS_ED_ALL_ED_FT
Follow up with neurology  return to er for any worsening symptoms     Paresthesia    WHAT YOU NEED TO KNOW:    Paresthesia is numbness, tingling, or burning. It can happen in any part of your body, but usually occurs in your legs, feet, arms, or hands.    DISCHARGE INSTRUCTIONS:    Return to the emergency department if:   •You have severe pain along with numbness and tingling.      •Your legs suddenly become cold. You have trouble moving your legs, and they ache.      •You have increased weakness in a part of your body.      •You have uncontrolled movements.      Contact your healthcare provider or neurologist if:   •Your symptoms do not improve.      •You have symptoms in more than one part of your body.      •You have questions or concerns about your condition or care.      Manage paresthesia:   •Protect the area from injury. You may injure or burn yourself if you lose feeling in the area. Be careful when you touch anything that could be hot. Wear sturdy shoes to protect your feet. Ask about other ways to protect yourself.       •Go to physical or occupational therapy if directed. Your provider may recommend therapy if you have a condition such as carpal tunnel syndrome. A physical therapist can teach you exercises to help strengthen the area or increase your ability to move it. An occupational therapist can help you find new ways to do your daily activities.      •Manage health conditions that can cause paresthesia. Work with your diabetes specialist if you have uncontrolled diabetes. A dietitian or your healthcare provider can help you create a meal plan if you have low vitamin B levels. Your provider can help you manage your health if you have multiple sclerosis or you had a stroke. It is important to manage health conditions to stop paresthesia or prevent it from getting worse.      Follow up with your healthcare provider or neurologist as directed: Your healthcare provider may refer you to a specialist. Write down your questions so you remember to ask them during your visits.       © Copyright [employer] 2021

## 2021-08-13 NOTE — ED PROVIDER NOTE - ATTENDING CONTRIBUTION TO CARE
pt is a 56 oy f who has hx of hypothyroid, sp left hip thr on lupron monthly and sp bariatric surgery. yesterday she did not feel well and daughter states while riding in the car she was noted to have passed out momentarily without any sequale.  today pt felt dizzy with numbness to left side of her face  no vision changes no facial droop no motor weakness no other sx  she has no pmd she follows with her gyn dr El Hobbs and is not a smoker is a social drinker denies drugs or allergies  on eval  wd wn female who is a and o x 3 nad cooperative  heent nc at missing some posterior teeth, no dysphagia no facial asymmetry  neck supple cor rrr no m  chest cta abd obese soft nt nd no hsm no pain at surgy site or sleeve site  ext normal  neuro nihss=0 gcs=15 skin normal -tanned  plan ct cta perfusion ro dehydration ro metabolic in pt with gastric sleeve ro intracranial ro cardiac arrhythmia re eval.

## 2021-08-13 NOTE — ED PROVIDER NOTE - PATIENT PORTAL LINK FT
You can access the FollowMyHealth Patient Portal offered by [facility] by registering at the following website: [email or web address]. By joining [facility]’s FollowMyHealth portal, you will also be able to view your health information using other applications (apps) compatible with our system.

## 2021-08-13 NOTE — ED ADULT NURSE NOTE - NSICDXPASTMEDICALHX_GEN_ALL_CORE_FT
PAST MEDICAL HISTORY:  Hypothyroidism     OA (osteoarthritis)     Polyp of corpus uteri     Prediabetes

## 2021-08-13 NOTE — ED PROVIDER NOTE - CARE PROVIDER_API CALL
Demarco Mac  NEUROLOGY  [address] [place] [address]  Phone: [phone]  Fax: [phone]  Follow Up Time:

## 2021-08-13 NOTE — ED PROVIDER NOTE - PROGRESS NOTE DETAILS
dw radiology no acute noted on dry scan await perfusion study for final report imaging negative symptoms resolved case d/w Dr. Mac advised f/u in office pt advised to return for any worsening symptoms normal gait in er

## 2021-08-13 NOTE — ED ADULT NURSE NOTE - NSICDXFAMILYHX_GEN_ALL_CORE_FT
FAMILY HISTORY:  Father  Still living? No  MI (myocardial infarction), Age at diagnosis: Age Unknown    Mother  Still living? No  Family history of ovarian cancer, Age at diagnosis: Age Unknown

## 2021-08-13 NOTE — ED PROVIDER NOTE - CLINICAL SUMMARY MEDICAL DECISION MAKING FREE TEXT BOX
Pt is a 57 yo female with paresthesia and headache left side nvi NIH 0 symptoms improved not TPA candidate

## 2021-08-13 NOTE — ED ADULT NURSE REASSESSMENT NOTE - NS ED NURSE REASSESS COMMENT FT1
Pt was wearing a pair of earrings; removed them in CT scan room and placed in pink denture cup.  Upon return to ED, pink denture cup with earring in it given to daughter Yocasta.

## 2021-12-16 ENCOUNTER — EMERGENCY (EMERGENCY)
Facility: HOSPITAL | Age: 56
LOS: 1 days | Discharge: HOME | End: 2021-12-16
Attending: INTERNAL MEDICINE | Admitting: INTERNAL MEDICINE
Payer: COMMERCIAL

## 2021-12-16 VITALS
HEIGHT: 65 IN | DIASTOLIC BLOOD PRESSURE: 102 MMHG | SYSTOLIC BLOOD PRESSURE: 146 MMHG | RESPIRATION RATE: 22 BRPM | WEIGHT: 199.96 LBS | TEMPERATURE: 101 F | OXYGEN SATURATION: 98 % | HEART RATE: 82 BPM

## 2021-12-16 VITALS
RESPIRATION RATE: 20 BRPM | DIASTOLIC BLOOD PRESSURE: 70 MMHG | TEMPERATURE: 99 F | SYSTOLIC BLOOD PRESSURE: 116 MMHG | HEART RATE: 80 BPM | OXYGEN SATURATION: 94 %

## 2021-12-16 DIAGNOSIS — Z98.891 HISTORY OF UTERINE SCAR FROM PREVIOUS SURGERY: Chronic | ICD-10-CM

## 2021-12-16 DIAGNOSIS — Z98.890 OTHER SPECIFIED POSTPROCEDURAL STATES: Chronic | ICD-10-CM

## 2021-12-16 DIAGNOSIS — Z41.9 ENCOUNTER FOR PROCEDURE FOR PURPOSES OTHER THAN REMEDYING HEALTH STATE, UNSPECIFIED: Chronic | ICD-10-CM

## 2021-12-16 LAB
ALBUMIN SERPL ELPH-MCNC: 3.6 G/DL — SIGNIFICANT CHANGE UP (ref 3.3–5)
ALP SERPL-CCNC: 100 U/L — SIGNIFICANT CHANGE UP (ref 40–120)
ALT FLD-CCNC: 32 U/L — SIGNIFICANT CHANGE UP (ref 12–78)
ANION GAP SERPL CALC-SCNC: 6 MMOL/L — SIGNIFICANT CHANGE UP (ref 5–17)
AST SERPL-CCNC: 21 U/L — SIGNIFICANT CHANGE UP (ref 15–37)
BASOPHILS # BLD AUTO: 0.02 K/UL — SIGNIFICANT CHANGE UP (ref 0–0.2)
BASOPHILS NFR BLD AUTO: 0.4 % — SIGNIFICANT CHANGE UP (ref 0–2)
BILIRUB SERPL-MCNC: 0.4 MG/DL — SIGNIFICANT CHANGE UP (ref 0.2–1.2)
BUN SERPL-MCNC: 12 MG/DL — SIGNIFICANT CHANGE UP (ref 7–23)
CALCIUM SERPL-MCNC: 9 MG/DL — SIGNIFICANT CHANGE UP (ref 8.5–10.1)
CHLORIDE SERPL-SCNC: 106 MMOL/L — SIGNIFICANT CHANGE UP (ref 96–108)
CO2 SERPL-SCNC: 29 MMOL/L — SIGNIFICANT CHANGE UP (ref 22–31)
CREAT SERPL-MCNC: 0.73 MG/DL — SIGNIFICANT CHANGE UP (ref 0.5–1.3)
CRP SERPL-MCNC: <3 MG/L — SIGNIFICANT CHANGE UP
D DIMER BLD IA.RAPID-MCNC: 229 NG/ML DDU — SIGNIFICANT CHANGE UP
EOSINOPHIL # BLD AUTO: 0.05 K/UL — SIGNIFICANT CHANGE UP (ref 0–0.5)
EOSINOPHIL NFR BLD AUTO: 0.9 % — SIGNIFICANT CHANGE UP (ref 0–6)
GLUCOSE SERPL-MCNC: 93 MG/DL — SIGNIFICANT CHANGE UP (ref 70–99)
HCT VFR BLD CALC: 42 % — SIGNIFICANT CHANGE UP (ref 34.5–45)
HGB BLD-MCNC: 13.8 G/DL — SIGNIFICANT CHANGE UP (ref 11.5–15.5)
IMM GRANULOCYTES NFR BLD AUTO: 0.4 % — SIGNIFICANT CHANGE UP (ref 0–1.5)
LYMPHOCYTES # BLD AUTO: 1.09 K/UL — SIGNIFICANT CHANGE UP (ref 1–3.3)
LYMPHOCYTES # BLD AUTO: 20.3 % — SIGNIFICANT CHANGE UP (ref 13–44)
MCHC RBC-ENTMCNC: 30.7 PG — SIGNIFICANT CHANGE UP (ref 27–34)
MCHC RBC-ENTMCNC: 32.9 GM/DL — SIGNIFICANT CHANGE UP (ref 32–36)
MCV RBC AUTO: 93.3 FL — SIGNIFICANT CHANGE UP (ref 80–100)
MONOCYTES # BLD AUTO: 0.66 K/UL — SIGNIFICANT CHANGE UP (ref 0–0.9)
MONOCYTES NFR BLD AUTO: 12.3 % — SIGNIFICANT CHANGE UP (ref 2–14)
NEUTROPHILS # BLD AUTO: 3.54 K/UL — SIGNIFICANT CHANGE UP (ref 1.8–7.4)
NEUTROPHILS NFR BLD AUTO: 65.7 % — SIGNIFICANT CHANGE UP (ref 43–77)
NRBC # BLD: 0 /100 WBCS — SIGNIFICANT CHANGE UP (ref 0–0)
PLATELET # BLD AUTO: 258 K/UL — SIGNIFICANT CHANGE UP (ref 150–400)
POTASSIUM SERPL-MCNC: 4.1 MMOL/L — SIGNIFICANT CHANGE UP (ref 3.5–5.3)
POTASSIUM SERPL-SCNC: 4.1 MMOL/L — SIGNIFICANT CHANGE UP (ref 3.5–5.3)
PROCALCITONIN SERPL-MCNC: 0.07 NG/ML — HIGH (ref 0–0.04)
PROT SERPL-MCNC: 8 G/DL — SIGNIFICANT CHANGE UP (ref 6–8.3)
RBC # BLD: 4.5 M/UL — SIGNIFICANT CHANGE UP (ref 3.8–5.2)
RBC # FLD: 13.9 % — SIGNIFICANT CHANGE UP (ref 10.3–14.5)
SARS-COV-2 RNA SPEC QL NAA+PROBE: DETECTED
SODIUM SERPL-SCNC: 141 MMOL/L — SIGNIFICANT CHANGE UP (ref 135–145)
WBC # BLD: 5.38 K/UL — SIGNIFICANT CHANGE UP (ref 3.8–10.5)
WBC # FLD AUTO: 5.38 K/UL — SIGNIFICANT CHANGE UP (ref 3.8–10.5)

## 2021-12-16 PROCEDURE — 36415 COLL VENOUS BLD VENIPUNCTURE: CPT

## 2021-12-16 PROCEDURE — 96360 HYDRATION IV INFUSION INIT: CPT

## 2021-12-16 PROCEDURE — 99285 EMERGENCY DEPT VISIT HI MDM: CPT

## 2021-12-16 PROCEDURE — 99284 EMERGENCY DEPT VISIT MOD MDM: CPT | Mod: 25

## 2021-12-16 PROCEDURE — 71045 X-RAY EXAM CHEST 1 VIEW: CPT

## 2021-12-16 PROCEDURE — 82728 ASSAY OF FERRITIN: CPT

## 2021-12-16 PROCEDURE — 86140 C-REACTIVE PROTEIN: CPT

## 2021-12-16 PROCEDURE — 80053 COMPREHEN METABOLIC PANEL: CPT

## 2021-12-16 PROCEDURE — 85379 FIBRIN DEGRADATION QUANT: CPT

## 2021-12-16 PROCEDURE — 84145 PROCALCITONIN (PCT): CPT

## 2021-12-16 PROCEDURE — 85025 COMPLETE CBC W/AUTO DIFF WBC: CPT

## 2021-12-16 PROCEDURE — 71045 X-RAY EXAM CHEST 1 VIEW: CPT | Mod: 26

## 2021-12-16 PROCEDURE — 87635 SARS-COV-2 COVID-19 AMP PRB: CPT

## 2021-12-16 RX ORDER — SODIUM CHLORIDE 9 MG/ML
1000 INJECTION INTRAMUSCULAR; INTRAVENOUS; SUBCUTANEOUS ONCE
Refills: 0 | Status: COMPLETED | OUTPATIENT
Start: 2021-12-16 | End: 2021-12-16

## 2021-12-16 RX ORDER — ACETAMINOPHEN 500 MG
650 TABLET ORAL ONCE
Refills: 0 | Status: COMPLETED | OUTPATIENT
Start: 2021-12-16 | End: 2021-12-16

## 2021-12-16 RX ADMIN — SODIUM CHLORIDE 1000 MILLILITER(S): 9 INJECTION INTRAMUSCULAR; INTRAVENOUS; SUBCUTANEOUS at 03:30

## 2021-12-16 RX ADMIN — Medication 650 MILLIGRAM(S): at 02:06

## 2021-12-16 RX ADMIN — SODIUM CHLORIDE 1000 MILLILITER(S): 9 INJECTION INTRAMUSCULAR; INTRAVENOUS; SUBCUTANEOUS at 02:06

## 2021-12-16 NOTE — ED PROVIDER NOTE - NSFOLLOWUPINSTRUCTIONS_ED_ALL_ED_FT
Currently you do not meet criteria for inpatient admission. You are being sent home at this time for home isolation. It is currently recommended at this time  that you isolate for the next 14 days unless further instructions are provided at a later date. All those who have been in close contact with you should also voluntary go on home isolation.   When home on home isolation please try to use your own bathroom and bedroom and limit contact with those around you as much as possible.   Contineu Tylenol per label intructions as needed for fever and body aches  Salt water rinses as needed for sore throat   Adance activity as tolerated  Please return to the ED for any concerning signs including increased difficulty breathing or signs of respiratory distress  Follow up with Dr Holden regarding the immunoglobulin infusion

## 2021-12-16 NOTE — ED ADULT TRIAGE NOTE - CHIEF COMPLAINT QUOTE
Pt BIBA from home c.o shortness of breath, cough and fever x 2 weeks. Pt is unvaccinated and states her  is covid positive.

## 2021-12-16 NOTE — ED PROVIDER NOTE - OBJECTIVE STATEMENT
Pt BIBA from home c.o shortness of breath, cough and fever x 2 weeks. Pt is unvaccinated and states her  is covid positive.  shortness of breath 55 y/o female , Pt BIBA from home c.o shortness of breath, cough and fever x 2 weeks. Pt is unvaccinated and states her  is covid positive.  no rash no toxemia, no CP, no hypoxemia, no abdominal pain, no urinary symptoms, no stroke symptoms.

## 2021-12-16 NOTE — ED ADULT NURSE NOTE - NEURO GAIT
Supervising Physician:   Dr. Roderick Martinez.     This patient was seen at the request of the attending psychiatrist, Dr. Ayleen Goddard MD for history and physical medical consultation clearance.    History and Physical    Patient: Kareen Vivar  Date of Service: 2021    :     2002  PCP: Josselyn Mcelroy NP       Subjective:     Chief Complaint:my suicidal thoughts were getting really bad **    HPI: ** this is the patient's 1st treatment here .  She reports having a problem with suicidal thoughts for years .  She has a history of depression and anxiety.  She has on medication in the past and had some therapy but has not been helpful.  She is on her feet lately.  She denies hallucinations.  She denies self-harm such as cutting.  Sometimes she does take her nails into his skin.  She has never drawn blood that she has noticed.  Recently she has not been sleeping very well at night.  She is fatigued during the day.  Appetite fluctuates and she thinks that she might have lost a fair amount of weight at 1 point when she was not eating very much.      The COVID pandemic  has been stressful for the patient.  She is currently enrolled in a program to get her diploma for high school.  She sometimes has difficulty with focus and concentration.  She is currently living with her uncle and grandmother.  She does not have contact with her mother.  She does not really talk to her father.  She has 1 good girlfriend.  *    Past Medical History:   Diagnosis Date   • Tibia/fibula fracture    • Tonsillar hypertrophy        Past Surgical History:   Procedure Laterality Date   • Tonsillectomy and adenoidectomy  age 9        Prior to Admission medications    Medication Sig Start Date End Date Taking? Authorizing Provider   carbamide peroxide (DEBROX) 6.5 % otic solution Place 5 drops into both ears 2 times daily.   Yes Outside Provider   albuterol 108 (90 Base) MCG/ACT inhaler Inhale 2 puffs into the lungs every 4 hours as  needed for Shortness of Breath or Wheezing. 4/13/21  Yes Josselyn Mcelroy NP   norethindrone-ethinyl estradiol-FE (Junel FE 1/20) 1-20 MG-MCG per tablet Take 1 tablet by mouth daily. 10/30/20  Yes Josselyn Mcelroy NP   Cholecalciferol (Vitamin D-3) 125 mcg (5,000 units) tablet Take 1 tablet by mouth daily. 9/9/20  Yes Josselyn Mcelroy NP       Current IP Meds (From admission, onward)    Start     Stop Status Route Frequency Ordered    04/19/21 1805  albuterol inhaler 2 puff     Note to Pharmacy: OP SIG:Inhale 2 puffs into the lungs every 4 hours as needed for Shortness of Breath or Wheezing.      -- Dispensed IN EVERY 4 HOURS PRN 04/19/21 1805 04/19/21 1805  aluminum-magnesium hydroxide-simethicone (MAALOX) 200-200-20 MG/5ML suspension 30 mL  (Antacids)      -- Verified PO EVERY 2 HOURS PRN 04/19/21 1805 04/19/21 2100  carbamide peroxide (DEBROX) 6.5 % otic solution 5 drop     Note to Pharmacy: OP SIG:Place 5 drops into both ears 2 times daily.      -- Verified BOTH EARS 2 TIMES DAILY 04/19/21 1805 04/19/21 1851  diphenhydrAMINE (BENADRYL) capsule 50 mg      -- Verified PO NIGHTLY PRN 04/19/21 1851 04/19/21 1851  diphenhydrAMINE (BENADRYL) capsule 50 mg  (Oral or IM diphenydramine (For ages 13 - 17))      -- Verified PO EVERY 4 HOURS PRN 04/19/21 1851 04/19/21 1851  diphenhydrAMINE (BENADRYL) injection 50 mg  (Oral or IM diphenydramine (For ages 13 - 17))      -- Verified IM EVERY 4 HOURS PRN 04/19/21 1851 04/19/21 1805  ibuprofen (MOTRIN) tablet 400 mg      -- Verified PO EVERY 6 HOURS PRN 04/19/21 1805 04/19/21 1805  magnesium hydroxide (MILK OF MAGNESIA) 400 MG/5ML suspension 20 mL      -- Verified PO DAILY PRN 04/19/21 1805 04/20/21 0900  NON FORMULARY  Status:  Discontinued      04/20 1041 Discontinued  SEE ADMIN INSTRUCTIONS 04/19/21 1805 04/20/21 1045  norethindrone-ethinyl estradiol-FE (JUNEL FE 1/20) 1-20 MG-MCG Tab 1 tablet      -- Dispensed PO DAILY 04/20/21 1041    04/19/21 6799   OLANZapine (ZyPREXA ZYDIS) disintegrating tablet 5 mg      -- Verified PO EVERY 4 HOURS PRN 04/19/21 1851 04/19/21 1851  traZODone (DESYREL) tablet 50 mg      -- Verified PO NIGHTLY PRN 04/19/21 1851          ALLERGIES:   Allergen Reactions   • Penicillins HIVES       Family History   Problem Relation Age of Onset   • Alcohol Abuse Mother    • Psychiatric Mother         Bipolar illness   • Diabetes Other    • Hypertension Maternal Aunt    • Hypertension Maternal Uncle         Social History     Tobacco Use   • Smoking status: Passive Smoke Exposure - Never Smoker   • Smokeless tobacco: Never Used   Substance Use Topics   • Alcohol use: No   • Drug use: No       Review of Systems  CONSTITUTIONAL: Denies  Fever.  She has several headaches a week and sometimes of therapy that she will take ibuprofen.  Taking ibuprofen more frequently but wanted to staff that so she now only takes it if her headaches are more serious.  EYES:  She has astigmatism and was prescribed a contact lens for her left eye that no longer wears it.  ENT: Denies chronic sinus or nasal problems,dysphagia.  Bilateral cerumen impaction  CV:  Denies chest discomfort with exertion, SOB, palpitations.  RESPIRATORY: Denies cough, SOB.  GI:  Denies abdominal pain, frequent nausea, vomiting, diarrhea, constipation, hematemesis, hematocheziaGU: Denies frequency, dysuria.  MSK: Denies joint pain, muscle aches.  NEURO:  Denies muscle weakness or paralysis, numbness or tingling, seizures.  PSYCH: See HPI above.  HEME/LYMPH:  Denies abnormal bruising or bleeding, lumps or bumps.  ALLERGY/IMMUN: Denies chronic sinus problems, chronic nasal problems.  Menarche at 13 years old.  Her menses was irregular until she went on hormonal therapy for that and now it is regular.  All other systems reviewed and negative.      Objective:     Visit Vitals  /79 (BP Location: RUE - Right upper extremity, Patient Position: Standing)   Pulse 103   Temp 98.3 °F (36.8 °C)  (Oral)   Resp 15   Ht 5' 2\" (1.575 m)   Wt 54.9 kg   LMP 04/03/2021   SpO2 98%   BMI 22.13 kg/m²       General Appearance: Well developed female. Alert, cooperative, no distress, appears stated age.  HEENT: Normocephalic, without obvious abnormality. PERRL, no icterus, EOM's intact. Hearing appears intact. No nasal discharge.  hands are not able to be dialyzed.  His other restriction noted  Neck: Supple, symmetrical, trachea midline, no adenopathy. Thyroid: no enlargement/tenderness/nodules.  Back: ROM normal, no CVA tenderness.  Lungs: Clear to auscultation bilaterally, respirations unlabored.  Heart: Regular rate and rhythm, S1 and S2 normal, no murmur, rub or gallop.  Abdomen: Soft, non-tender, bowel sounds active, no masses, no organomegaly.  Extremities: Extremities normal, atraumatic, no cyanosis. No stiffness, swelling.   Skin: Skin color, texture, turgor normal, no rashes or lesions.  Lymph nodes: Cervical nodes normal.  Genitorectal: Deferred.  Neurologic: CRANIAL NERVE:  Alert and oriented x3.  No gross deficit of sensory or cranial nerves II through XII:  II:  visual fields are normal.  III, IV, VI:  External ocular movements are normal, and there is no presence of any nystagmus or ptosis.  Pupils are of equal size, regularity, and react equally to the light and accommodation.  No evidence of divergent or convergent strabismus or diplopia when looking down or to either side.  V:     There is no deviation of the jaw or weakness of masseter or temporal muscles.  VII: Facial expression, nasolabial folds, forehead wrinkle are normal.  VIII: Hearing is intact.  There is no evidence of nystagmus and cerebellar function is intact.  IX:  Normal gag reflex.  X:   No evidence of deviation of the soft palate or laryngeal paralysis.    XI:  Shrug of shoulders is equal and strong.  XII: No deviation of the protruded tongue.  No evidence of atrophy or fine fibrillation.  Finger-nose test is normal.  Gait is normal.    Data Review:      CBC  Recent Labs   Lab 04/19/21  1149   WBC 7.0   RBC 4.96   HGB 14.6   HCT 43.9   MCV 88.5   MCH 29.4   MCHC 33.3   RDWCV 11.8      NRBCRE 0   SEG 61   TLYMPH 32   PMON 5   PEOS 1   PBASO 1   ANEUT 4.3   ALYMS 2.2   TONI 0.4   AEOS 0.1   ABASO 0.0       CMP  Recent Labs   Lab 04/19/21  1149   SODIUM 141   POTASSIUM 3.6   CHLORIDE 105   CO2 30   ANIONGAP 10   GLUCOSE 83   BUN 15   CREATININE 0.91*   BCRAT 16   CALCIUM 9.4   ALBUMIN 4.3   BILIRUBIN 0.7   ALKPT 56   AST 9*   GPT 19   GLOB 3.4   AGR 1.3       Lipid Panel  No results found    Urinalysis  Recent Labs   Lab 04/19/21  1149   COL Yellow   UAPP Clear   UGLU Negative   UBILI Negative   UKET Negative   USPG 1.020   URBC Negative   UPH 6.0   UPROT Negative   UROB 0.2   UNITR Negative   UWBC Negative       Other  No results found     Assessment:     ** anxiety and depression, suicide ideation, vitamin-D deficiency, bilateral cerumen impaction, reactive airway disease mild intermittent uncomplicated *    Plan:     * we are attempting to obtain Debrox eardrops the patient can continue to use them for her cerumen impaction.  I ordered 1 dose of 79015 units of vitamin D followed by 2000 units daily and patient plans on following up care with her primary care nurse practitioner in 3 months to get a recheck of her vitamin-D level.  Patient is cleared for treatment here, and advised to follow up with primary care provider for medical concerns.    Evangelina Morrison PA-C  4/20/2021   steady

## 2021-12-16 NOTE — ED ADULT NURSE NOTE - OBJECTIVE STATEMENT
patient a/o x 4 with a calm affect states that her  tested pos for covid 19 and the past two weeks the patient has been short of breath with fever.  patient adds that she had a 101 fever at home but did not take any medication to reduce fever.  patient afebrile in ED room and O2 sat is 95 on room air.

## 2021-12-16 NOTE — ED PROVIDER NOTE - CLINICAL SUMMARY MEDICAL DECISION MAKING FREE TEXT BOX
acute URI symptoms, tested positive for COVID 19, labs cxr saturation are all  normal .... patient was discharged in stable condition with O/P instructions and f/u

## 2021-12-16 NOTE — ED PROVIDER NOTE - CARE PROVIDER_API CALL
Tanner Holden)  Critical Care Medicine; Internal Medicine; Pulmonary Disease  [address] [place] [address]  Phone: [phone]  Fax: [phone]  Follow Up Time: 1-3 Days

## 2021-12-16 NOTE — ED PROVIDER NOTE - PATIENT PORTAL LINK FT
You can access the FollowMyHealth Patient Portal offered by [facility] by registering at the following website: [email or web address]. By joining [facility]’s FollowMyHealth portal, you will also be able to view your health information using other applications (apps) compatible with our system.

## 2021-12-18 LAB — FERRITIN SERPL-MCNC: 199 NG/ML — HIGH (ref 15–150)

## 2022-01-10 ENCOUNTER — APPOINTMENT (OUTPATIENT)
Dept: ULTRASOUND IMAGING | Facility: CLINIC | Age: 57
End: 2022-01-10
Payer: COMMERCIAL

## 2022-01-10 ENCOUNTER — OUTPATIENT (OUTPATIENT)
Dept: OUTPATIENT SERVICES | Facility: HOSPITAL | Age: 57
LOS: 1 days | End: 2022-01-10
Payer: COMMERCIAL

## 2022-01-10 DIAGNOSIS — Z98.890 OTHER SPECIFIED POSTPROCEDURAL STATES: Chronic | ICD-10-CM

## 2022-01-10 DIAGNOSIS — Z41.9 ENCOUNTER FOR PROCEDURE FOR PURPOSES OTHER THAN REMEDYING HEALTH STATE, UNSPECIFIED: Chronic | ICD-10-CM

## 2022-01-10 DIAGNOSIS — E04.2 NONTOXIC MULTINODULAR GOITER: ICD-10-CM

## 2022-01-10 DIAGNOSIS — Z98.891 HISTORY OF UTERINE SCAR FROM PREVIOUS SURGERY: Chronic | ICD-10-CM

## 2022-01-10 PROCEDURE — 76536 US EXAM OF HEAD AND NECK: CPT

## 2022-01-10 PROCEDURE — 76536 US EXAM OF HEAD AND NECK: CPT | Mod: 26

## 2022-01-18 ENCOUNTER — EMERGENCY (EMERGENCY)
Facility: HOSPITAL | Age: 57
LOS: 1 days | Discharge: HOME | End: 2022-01-18
Attending: EMERGENCY MEDICINE | Admitting: EMERGENCY MEDICINE
Payer: COMMERCIAL

## 2022-01-18 VITALS
SYSTOLIC BLOOD PRESSURE: 134 MMHG | OXYGEN SATURATION: 100 % | RESPIRATION RATE: 18 BRPM | TEMPERATURE: 98 F | HEART RATE: 69 BPM | DIASTOLIC BLOOD PRESSURE: 83 MMHG

## 2022-01-18 VITALS
WEIGHT: 235.01 LBS | OXYGEN SATURATION: 100 % | TEMPERATURE: 98 F | HEART RATE: 68 BPM | RESPIRATION RATE: 20 BRPM | SYSTOLIC BLOOD PRESSURE: 144 MMHG | DIASTOLIC BLOOD PRESSURE: 92 MMHG | HEIGHT: 65 IN

## 2022-01-18 DIAGNOSIS — Z98.891 HISTORY OF UTERINE SCAR FROM PREVIOUS SURGERY: Chronic | ICD-10-CM

## 2022-01-18 DIAGNOSIS — Z41.9 ENCOUNTER FOR PROCEDURE FOR PURPOSES OTHER THAN REMEDYING HEALTH STATE, UNSPECIFIED: Chronic | ICD-10-CM

## 2022-01-18 DIAGNOSIS — Z98.890 OTHER SPECIFIED POSTPROCEDURAL STATES: Chronic | ICD-10-CM

## 2022-01-18 LAB
ALBUMIN SERPL ELPH-MCNC: 3.4 G/DL — SIGNIFICANT CHANGE UP (ref 3.3–5)
ALP SERPL-CCNC: 83 U/L — SIGNIFICANT CHANGE UP (ref 30–120)
ALT FLD-CCNC: 19 U/L DA — SIGNIFICANT CHANGE UP (ref 10–60)
ANION GAP SERPL CALC-SCNC: 10 MMOL/L — SIGNIFICANT CHANGE UP (ref 5–17)
AST SERPL-CCNC: 18 U/L — SIGNIFICANT CHANGE UP (ref 10–40)
BASOPHILS # BLD AUTO: 0.02 K/UL — SIGNIFICANT CHANGE UP (ref 0–0.2)
BASOPHILS NFR BLD AUTO: 0.3 % — SIGNIFICANT CHANGE UP (ref 0–2)
BILIRUB SERPL-MCNC: 0.5 MG/DL — SIGNIFICANT CHANGE UP (ref 0.2–1.2)
BUN SERPL-MCNC: 11 MG/DL — SIGNIFICANT CHANGE UP (ref 7–23)
CALCIUM SERPL-MCNC: 8.8 MG/DL — SIGNIFICANT CHANGE UP (ref 8.4–10.5)
CHLORIDE SERPL-SCNC: 105 MMOL/L — SIGNIFICANT CHANGE UP (ref 96–108)
CO2 SERPL-SCNC: 27 MMOL/L — SIGNIFICANT CHANGE UP (ref 22–31)
CREAT SERPL-MCNC: 0.62 MG/DL — SIGNIFICANT CHANGE UP (ref 0.5–1.3)
D DIMER BLD IA.RAPID-MCNC: 252 NG/ML DDU — HIGH
EOSINOPHIL # BLD AUTO: 0.13 K/UL — SIGNIFICANT CHANGE UP (ref 0–0.5)
EOSINOPHIL NFR BLD AUTO: 2 % — SIGNIFICANT CHANGE UP (ref 0–6)
GLUCOSE SERPL-MCNC: 99 MG/DL — SIGNIFICANT CHANGE UP (ref 70–99)
HCT VFR BLD CALC: 41 % — SIGNIFICANT CHANGE UP (ref 34.5–45)
HGB BLD-MCNC: 13.6 G/DL — SIGNIFICANT CHANGE UP (ref 11.5–15.5)
IMM GRANULOCYTES NFR BLD AUTO: 0.3 % — SIGNIFICANT CHANGE UP (ref 0–1.5)
LYMPHOCYTES # BLD AUTO: 2.43 K/UL — SIGNIFICANT CHANGE UP (ref 1–3.3)
LYMPHOCYTES # BLD AUTO: 37.7 % — SIGNIFICANT CHANGE UP (ref 13–44)
MCHC RBC-ENTMCNC: 30.8 PG — SIGNIFICANT CHANGE UP (ref 27–34)
MCHC RBC-ENTMCNC: 33.2 GM/DL — SIGNIFICANT CHANGE UP (ref 32–36)
MCV RBC AUTO: 93 FL — SIGNIFICANT CHANGE UP (ref 80–100)
MONOCYTES # BLD AUTO: 0.57 K/UL — SIGNIFICANT CHANGE UP (ref 0–0.9)
MONOCYTES NFR BLD AUTO: 8.8 % — SIGNIFICANT CHANGE UP (ref 2–14)
NEUTROPHILS # BLD AUTO: 3.28 K/UL — SIGNIFICANT CHANGE UP (ref 1.8–7.4)
NEUTROPHILS NFR BLD AUTO: 50.9 % — SIGNIFICANT CHANGE UP (ref 43–77)
NRBC # BLD: 0 /100 WBCS — SIGNIFICANT CHANGE UP (ref 0–0)
NT-PROBNP SERPL-SCNC: 92 PG/ML — SIGNIFICANT CHANGE UP (ref 0–125)
PLATELET # BLD AUTO: 233 K/UL — SIGNIFICANT CHANGE UP (ref 150–400)
POTASSIUM SERPL-MCNC: 4 MMOL/L — SIGNIFICANT CHANGE UP (ref 3.5–5.3)
POTASSIUM SERPL-SCNC: 4 MMOL/L — SIGNIFICANT CHANGE UP (ref 3.5–5.3)
PROT SERPL-MCNC: 7.3 G/DL — SIGNIFICANT CHANGE UP (ref 6–8.3)
RBC # BLD: 4.41 M/UL — SIGNIFICANT CHANGE UP (ref 3.8–5.2)
RBC # FLD: 13.2 % — SIGNIFICANT CHANGE UP (ref 10.3–14.5)
SODIUM SERPL-SCNC: 142 MMOL/L — SIGNIFICANT CHANGE UP (ref 135–145)
TROPONIN I, HIGH SENSITIVITY RESULT: 5.1 NG/L — SIGNIFICANT CHANGE UP
WBC # BLD: 6.45 K/UL — SIGNIFICANT CHANGE UP (ref 3.8–10.5)
WBC # FLD AUTO: 6.45 K/UL — SIGNIFICANT CHANGE UP (ref 3.8–10.5)

## 2022-01-18 PROCEDURE — 96361 HYDRATE IV INFUSION ADD-ON: CPT

## 2022-01-18 PROCEDURE — 85379 FIBRIN DEGRADATION QUANT: CPT

## 2022-01-18 PROCEDURE — 83880 ASSAY OF NATRIURETIC PEPTIDE: CPT

## 2022-01-18 PROCEDURE — 93010 ELECTROCARDIOGRAM REPORT: CPT

## 2022-01-18 PROCEDURE — 71275 CT ANGIOGRAPHY CHEST: CPT | Mod: 26,MA

## 2022-01-18 PROCEDURE — 84484 ASSAY OF TROPONIN QUANT: CPT

## 2022-01-18 PROCEDURE — 99284 EMERGENCY DEPT VISIT MOD MDM: CPT | Mod: 25

## 2022-01-18 PROCEDURE — 36415 COLL VENOUS BLD VENIPUNCTURE: CPT

## 2022-01-18 PROCEDURE — 93005 ELECTROCARDIOGRAM TRACING: CPT

## 2022-01-18 PROCEDURE — 96360 HYDRATION IV INFUSION INIT: CPT | Mod: XU

## 2022-01-18 PROCEDURE — 71046 X-RAY EXAM CHEST 2 VIEWS: CPT | Mod: 26

## 2022-01-18 PROCEDURE — 85025 COMPLETE CBC W/AUTO DIFF WBC: CPT

## 2022-01-18 PROCEDURE — 80053 COMPREHEN METABOLIC PANEL: CPT

## 2022-01-18 PROCEDURE — 71046 X-RAY EXAM CHEST 2 VIEWS: CPT

## 2022-01-18 PROCEDURE — 71275 CT ANGIOGRAPHY CHEST: CPT | Mod: MA

## 2022-01-18 PROCEDURE — 99285 EMERGENCY DEPT VISIT HI MDM: CPT

## 2022-01-18 RX ORDER — SODIUM CHLORIDE 9 MG/ML
1000 INJECTION INTRAMUSCULAR; INTRAVENOUS; SUBCUTANEOUS
Refills: 0 | Status: DISCONTINUED | OUTPATIENT
Start: 2022-01-18 | End: 2022-01-21

## 2022-01-18 RX ORDER — IBUPROFEN 200 MG
1 TABLET ORAL
Qty: 0 | Refills: 0 | DISCHARGE

## 2022-01-18 RX ORDER — METHOCARBAMOL 500 MG/1
0 TABLET, FILM COATED ORAL
Qty: 90 | Refills: 0 | DISCHARGE

## 2022-01-18 RX ORDER — ASCORBIC ACID 60 MG
1 TABLET,CHEWABLE ORAL
Qty: 0 | Refills: 0 | DISCHARGE

## 2022-01-18 RX ORDER — LEVOTHYROXINE SODIUM 125 MCG
0 TABLET ORAL
Qty: 0 | Refills: 0 | DISCHARGE

## 2022-01-18 RX ORDER — METFORMIN HYDROCHLORIDE 850 MG/1
0 TABLET ORAL
Qty: 0 | Refills: 0 | DISCHARGE

## 2022-01-18 RX ADMIN — SODIUM CHLORIDE 1000 MILLILITER(S): 9 INJECTION INTRAMUSCULAR; INTRAVENOUS; SUBCUTANEOUS at 15:15

## 2022-01-18 RX ADMIN — SODIUM CHLORIDE 150 MILLILITER(S): 9 INJECTION INTRAMUSCULAR; INTRAVENOUS; SUBCUTANEOUS at 12:55

## 2022-01-18 NOTE — ED ADULT NURSE REASSESSMENT NOTE - NS ED NURSE REASSESS COMMENT FT1
1340- Received report on patient for meal break coverage. Patient at radiology department at this time.

## 2022-01-18 NOTE — ED PROVIDER NOTE - OBJECTIVE STATEMENT
Inocente georges Covid since mid of last December and has been coughing and sob.   Started on Doxy by Navin this past Thursday s improvement.  Today, had 2 episodes of hemoptysis. Dx c Covid since mid of last December and has been coughing and sob.   Started on Doxy by Navin this past Thursday s improvement.  Today, had 2 episodes of hemoptysis.  No other complaints.

## 2022-01-18 NOTE — ED ADULT NURSE NOTE - OBJECTIVE STATEMENT
56 YOF A&OX3 with pmh of asthma and hypothyroidism presents to ED for cough. pt states had covid in December and received monoclonal antibodies but has a persistent cough. pt was instructed by MD Holden to go to hospital for CT of lungs. upon assessment pt's oxygen at 100% on RA. pt complains of brown sputum with cough. pt noted to have wet cough with no sputum at this time. pt placed on cardiac monitor and EKG completed shows NSR. pt denies sob, chest pain, n/v/d, headaches, dizziness, blurry vision. safety maintained,

## 2022-01-18 NOTE — ED ADULT TRIAGE NOTE - CHIEF COMPLAINT QUOTE
had covid in december and had monoclonal antibody and end december coughing a lot still saw pmd for coughing up brown and green mucus and started on doxycycline 3 days ago and today coughing up blood and called pmd and told to come to ED for ct scan

## 2022-01-18 NOTE — ED PROVIDER NOTE - CARE PROVIDER_API CALL
Tanner Holden)  Critical Care Medicine; Internal Medicine; Pulmonary Disease  [address] [place] [address]  Phone: [phone]  Fax: [phone]  Follow Up Time: 1-3 Days

## 2022-01-18 NOTE — ED PROVIDER NOTE - PROGRESS NOTE DETAILS
case dw Navin.  All results were explained to patient and/or family and a copy of all available results given.

## 2022-02-16 NOTE — ASU DISCHARGE PLAN (ADULT/PEDIATRIC). - =======================================================================
ACTION PLAN   Goal(s):        Today s Date:                                                                                   Goal(s) completion date:      Steps to be taken to manage BP When will I accomplish these steps Barriers Status   (12/18/18) BP elevated. On Amlodipine-add Irbesartan 150mg qd and f/u with JDL in 2 weeks along w/ BMP.    (12/19/18) Pt called-would like to increase amlodipine before adding another medicine. Per JDL that would be OK to increase to 10mg qd and will watch for signs of lower extremity edema. CSS only BP check in 1 mo    (1/8/19) Pt calls c/o increase LE edema. Per JDL decrease Amlodipine back to 5mg qd and start on Irbesartan 150mg qd. BMP and OV in 2 weeks. LM w/ info for pt and advised to call if needed. RTC in place.    (2/13/19) BP at goal.      Steps to be taken When will I accomplish these steps Barriers Status             Steps to be taken When will I accomplish these steps Barriers Status             Steps to be taken When will I accomplish these steps Barriers Status   Statement Selected

## 2022-04-01 ENCOUNTER — TRANSCRIPTION ENCOUNTER (OUTPATIENT)
Age: 57
End: 2022-04-01

## 2022-04-01 NOTE — ED ADULT NURSE NOTE - NSHOSCREENINGQ1_ED_ALL_ED
----- Message from Brian Smalls MD sent at 4/1/2022 10:27 AM CDT -----  He is stable lab.  Repeat with routine labs prior to follow-up.  
Spoke patient and she stated that she would like her lab repeated in 6 months to assure she is on the right track.     Spoke with patient and conveyed results and recommendations as listed below. Patient verbalized understanding.  
No

## 2022-05-02 ENCOUNTER — APPOINTMENT (OUTPATIENT)
Dept: ORTHOPEDIC SURGERY | Facility: CLINIC | Age: 57
End: 2022-05-02

## 2022-06-16 ENCOUNTER — APPOINTMENT (OUTPATIENT)
Dept: ORTHOPEDIC SURGERY | Facility: CLINIC | Age: 57
End: 2022-06-16
Payer: OTHER MISCELLANEOUS

## 2022-06-16 VITALS — HEIGHT: 64 IN | WEIGHT: 210 LBS | BODY MASS INDEX: 35.85 KG/M2

## 2022-06-16 DIAGNOSIS — M70.62 TROCHANTERIC BURSITIS, LEFT HIP: ICD-10-CM

## 2022-06-16 DIAGNOSIS — M17.0 BILATERAL PRIMARY OSTEOARTHRITIS OF KNEE: ICD-10-CM

## 2022-06-16 PROCEDURE — 73562 X-RAY EXAM OF KNEE 3: CPT | Mod: 50

## 2022-06-16 PROCEDURE — 99214 OFFICE O/P EST MOD 30 MIN: CPT

## 2022-06-16 PROCEDURE — 99072 ADDL SUPL MATRL&STAF TM PHE: CPT

## 2022-06-16 PROCEDURE — 73502 X-RAY EXAM HIP UNI 2-3 VIEWS: CPT | Mod: LT

## 2022-06-16 RX ORDER — LEVOTHYROXINE SODIUM 112 UG/1
112 TABLET ORAL
Qty: 90 | Refills: 0 | Status: ACTIVE | COMMUNITY
Start: 2022-03-31

## 2022-06-16 RX ORDER — LEVOTHYROXINE SODIUM 137 UG/1
137 TABLET ORAL
Refills: 0 | Status: COMPLETED | COMMUNITY
End: 2022-06-16

## 2022-06-16 RX ORDER — BUDESONIDE, GLYCOPYRROLATE, AND FORMOTEROL FUMARATE 160; 9; 4.8 UG/1; UG/1; UG/1
160-9-4.8 AEROSOL, METERED RESPIRATORY (INHALATION)
Qty: 32 | Refills: 0 | Status: ACTIVE | COMMUNITY
Start: 2022-01-13

## 2022-06-16 NOTE — PHYSICAL EXAM
[Normal Mood and Affect] : normal mood and affect [Able to Communicate] : able to communicate [Well Developed] : well developed [Well Nourished] : well nourished [Left] : left hip with pelvis [FreeTextEntry8] : Slight tenderness abductor insertion [de-identified] : Slight dystrophic gaot [FreeTextEntry3] : No swelling [FreeTextEntry9] : Reviewed and interpreted.  Right knee AP standing, lateral, sunrise views-moderate degenerative changes of the patellofemoral joint.  Mild to moderate narrowing the medial compartment on AP standing view\par \par Reviewed and interpreted.  Left knee AP standing, lateral, sunrise views-moderate to severe degenerative changes of the patellofemoral joint.  Moderate narrowing of the medial compartment on AP standing view [TWNoteComboBox7] : flexion 120 degrees [de-identified] : extension 0 degrees [Bilateral] : foot and ankle bilaterally [] : no calf tenderness

## 2022-06-16 NOTE — REASON FOR VISIT
[FreeTextEntry2] : Patient is here for Follow Up B Knees L Hip. Patient complains of soreness B Knees. Discomfort laying on L SIde./////////// XRAY B KNEES Patient is only taking BREZTRI AND SYNTHROID

## 2022-06-16 NOTE — HISTORY OF PRESENT ILLNESS
[Work related] : work related [Gradual] : gradual [Dull/Aching] : dull/aching [Intermittent] : intermittent [Leisure] : leisure [Rest] : rest [Injection therapy] : injection therapy [Stairs] : stairs [Full time] : Work status: full time [de-identified] : Date of injury November 13, 2020\par The patient has mild occasional pain left lateral hip.  Pain after sitting and getting up.  Occasional pain walking\par Her knees feel better after completing Orthovisc injections.  She has mild pain occasionally standing and walking.  Mild pain with stairs and movie sign.  Doing home exercises.  Taking Tylenol p.r.n. [] : Post Surgical Visit: no [FreeTextEntry3] : 11/13/2020 [de-identified] : 3/21/2022 [de-identified] : Dr. Mathew  [de-identified] : [facility]

## 2022-06-16 NOTE — DISCUSSION/SUMMARY
[de-identified] : Ice to her knees p.r.n.\par Moist heat to her left hip p.r.n.\par Continue home exercises\par Tylenol p.r.n.  Unable to take NSAIDs he could is of history of gastric sleeve surgery\par \par Impression:\par Status post fall with work injury November 13, 2020\par Strain left hip/trochanteric bursitis, status post left total hip replacement\par Moderate osteoarthritis right knee\par Moderate to severe osteoarthritis left knee

## 2022-06-19 ENCOUNTER — APPOINTMENT (OUTPATIENT)
Dept: AFTER HOURS CARE | Facility: EMERGENCY ROOM | Age: 57
End: 2022-06-19
Payer: COMMERCIAL

## 2022-06-19 DIAGNOSIS — U07.1 COVID-19: ICD-10-CM

## 2022-06-19 PROCEDURE — 99204 OFFICE O/P NEW MOD 45 MIN: CPT | Mod: 95

## 2022-06-19 RX ORDER — ALBUTEROL SULFATE 90 UG/1
108 (90 BASE) INHALANT RESPIRATORY (INHALATION)
Qty: 2 | Refills: 0 | Status: ACTIVE | COMMUNITY
Start: 2022-06-19 | End: 1900-01-01

## 2022-06-19 RX ORDER — NIRMATRELVIR AND RITONAVIR 300-100 MG
20 X 150 MG & KIT ORAL
Qty: 30 | Refills: 0 | Status: ACTIVE | COMMUNITY
Start: 2022-06-19 | End: 1900-01-01

## 2022-06-19 RX ORDER — BENZONATATE 200 MG/1
200 CAPSULE ORAL 3 TIMES DAILY
Qty: 12 | Refills: 0 | Status: ACTIVE | COMMUNITY
Start: 2022-06-19 | End: 1900-01-01

## 2022-06-19 RX ORDER — PREDNISONE 20 MG/1
20 TABLET ORAL
Qty: 10 | Refills: 0 | Status: ACTIVE | COMMUNITY
Start: 2022-06-19 | End: 1900-01-01

## 2022-06-19 NOTE — PHYSICAL EXAM
[No Acute Distress] : no acute distress [EOMI] : extraocular movements intact [Normal Outer Ear/Nose] : the outer ears and nose were normal in appearance [Supple] : supple [No Respiratory Distress] : no respiratory distress  [No Accessory Muscle Use] : no accessory muscle use [Normal Insight/Judgement] : insight and judgment were intact [de-identified] : moves all ext  [de-identified] : no rash on face  [de-identified] : alert and orinted

## 2022-06-19 NOTE — HISTORY OF PRESENT ILLNESS
[Home] : at home, [unfilled] , at the time of the visit. [Other Location: e.g. Home (Enter Location, City,State)___] : at [unfilled] [Verbal consent obtained from patient] : the patient, [unfilled] [FreeTextEntry8] : 56 yo F with asthma on inhal steroid and hypothyroidism presenting with3 days of ha, cholls and had positive covid at urgent care [facility] and was referred for MAB . pt not vaccinated and had covid previously 6 month ago

## 2022-06-19 NOTE — REVIEW OF SYSTEMS
[Fever] : fever [Chills] : chills [Fatigue] : fatigue [Cough] : cough [Discharge] : no discharge [Vision Problems] : no vision problems [Earache] : no earache [Nasal Discharge] : no nasal discharge [Chest Pain] : no chest pain [Orthopnea] : no orthopnea [Shortness Of Breath] : no shortness of breath [Wheezing] : no wheezing [Abdominal Pain] : no abdominal pain [Vomiting] : no vomiting [Dysuria] : no dysuria [Hematuria] : no hematuria [Joint Pain] : no joint pain [Muscle Pain] : no muscle pain [Itching] : no itching [Skin Rash] : no skin rash [Headache] : no headache [Dizziness] : no dizziness

## 2022-08-01 NOTE — ED ADULT NURSE NOTE - MENSTRUAL DETAILS
Kory Man 
Notified significant other paperwork should be completed by the Surgeon.
post-menopausal

## 2022-10-06 ENCOUNTER — APPOINTMENT (OUTPATIENT)
Dept: ORTHOPEDIC SURGERY | Facility: CLINIC | Age: 57
End: 2022-10-06

## 2022-10-06 VITALS — WEIGHT: 210 LBS | BODY MASS INDEX: 35.85 KG/M2 | HEIGHT: 64 IN

## 2022-10-06 DIAGNOSIS — M18.11 UNILATERAL PRIMARY OSTEOARTHRITIS OF FIRST CARPOMETACARPAL JOINT, RIGHT HAND: ICD-10-CM

## 2022-10-06 DIAGNOSIS — M79.646 PAIN IN UNSPECIFIED FINGER(S): ICD-10-CM

## 2022-10-06 PROCEDURE — 99214 OFFICE O/P EST MOD 30 MIN: CPT | Mod: 25

## 2022-10-06 PROCEDURE — J3490M: CUSTOM

## 2022-10-06 PROCEDURE — 20600 DRAIN/INJ JOINT/BURSA W/O US: CPT

## 2022-10-06 PROCEDURE — 73140 X-RAY EXAM OF FINGER(S): CPT | Mod: RT

## 2022-10-10 PROBLEM — M18.11 PRIMARY OSTEOARTHRITIS OF FIRST CARPOMETACARPAL JOINT OF RIGHT HAND: Status: ACTIVE | Noted: 2022-10-10

## 2022-10-10 NOTE — PHYSICAL EXAM
[de-identified] : R hand: \par +thumb CMC swelling \par +thumb CMC tenderness \par Decreased thumb ROM \par +Basal grind test\par \par Xrays +OA

## 2022-10-10 NOTE — HISTORY OF PRESENT ILLNESS
[Gradual] : gradual [8] : 8 [4] : 4 [Dull/Aching] : dull/aching [Sharp] : sharp [Throbbing] : throbbing [Nothing helps with pain getting better] : Nothing helps with pain getting better [de-identified] : R thumb CMC pain and swelling  [] : no [FreeTextEntry1] : right hand / thumb [FreeTextEntry3] : a couple months  [FreeTextEntry5] : she is having pain, swelling and locking  [FreeTextEntry7] : up the arm  [de-identified] : activity

## 2022-10-12 ENCOUNTER — TRANSCRIPTION ENCOUNTER (OUTPATIENT)
Age: 57
End: 2022-10-12

## 2022-10-28 ENCOUNTER — OUTPATIENT (OUTPATIENT)
Dept: OUTPATIENT SERVICES | Facility: HOSPITAL | Age: 57
LOS: 1 days | End: 2022-10-28
Payer: COMMERCIAL

## 2022-10-28 ENCOUNTER — APPOINTMENT (OUTPATIENT)
Dept: ULTRASOUND IMAGING | Facility: CLINIC | Age: 57
End: 2022-10-28

## 2022-10-28 DIAGNOSIS — Z00.8 ENCOUNTER FOR OTHER GENERAL EXAMINATION: ICD-10-CM

## 2022-10-28 DIAGNOSIS — Z98.890 OTHER SPECIFIED POSTPROCEDURAL STATES: Chronic | ICD-10-CM

## 2022-10-28 DIAGNOSIS — Z98.891 HISTORY OF UTERINE SCAR FROM PREVIOUS SURGERY: Chronic | ICD-10-CM

## 2022-10-28 DIAGNOSIS — Z41.9 ENCOUNTER FOR PROCEDURE FOR PURPOSES OTHER THAN REMEDYING HEALTH STATE, UNSPECIFIED: Chronic | ICD-10-CM

## 2022-10-28 PROCEDURE — 76536 US EXAM OF HEAD AND NECK: CPT | Mod: 26

## 2022-10-28 PROCEDURE — 76536 US EXAM OF HEAD AND NECK: CPT

## 2023-03-09 NOTE — ED PROVIDER NOTE - CARE PROVIDERS DIRECT ADDRESSES
[4___] : adduction 4[unfilled]/5 [Left] : left hip [AP] : anteroposterior [Outside films reviewed] : Outside films reviewed [There are no fractures, subluxations or dislocations. No significant abnormalities are seen] : There are no fractures, subluxations or dislocations. No significant abnormalities are seen [Moderate arthritis (Tonnis Grade 2)] : Moderate arthritis (Tonnis Grade 2) [] : no palpable masses [FreeTextEntry8] : greater troch tenderness [de-identified] : ileosois and hamstring 5/5 [FreeTextEntry9] : osteophytes, joint space narrowing, bone spurring, arthritis  ,DirectAddress_Unknown

## 2023-03-21 NOTE — ED PROVIDER NOTE - CROS ED ROS STATEMENT
no known recent seizures.  Staff to monitor and notify for any seizure activity     all other ROS negative except as per HPI

## 2023-08-21 ENCOUNTER — OUTPATIENT (OUTPATIENT)
Dept: OUTPATIENT SERVICES | Facility: HOSPITAL | Age: 58
LOS: 1 days | End: 2023-08-21
Payer: COMMERCIAL

## 2023-08-21 VITALS
HEART RATE: 66 BPM | RESPIRATION RATE: 16 BRPM | TEMPERATURE: 98 F | DIASTOLIC BLOOD PRESSURE: 70 MMHG | WEIGHT: 227.96 LBS | OXYGEN SATURATION: 99 % | SYSTOLIC BLOOD PRESSURE: 118 MMHG | HEIGHT: 64 IN

## 2023-08-21 DIAGNOSIS — Z90.3 ACQUIRED ABSENCE OF STOMACH [PART OF]: Chronic | ICD-10-CM

## 2023-08-21 DIAGNOSIS — N95.0 POSTMENOPAUSAL BLEEDING: ICD-10-CM

## 2023-08-21 DIAGNOSIS — Z98.890 OTHER SPECIFIED POSTPROCEDURAL STATES: Chronic | ICD-10-CM

## 2023-08-21 DIAGNOSIS — Z98.891 HISTORY OF UTERINE SCAR FROM PREVIOUS SURGERY: Chronic | ICD-10-CM

## 2023-08-21 DIAGNOSIS — Z96.642 PRESENCE OF LEFT ARTIFICIAL HIP JOINT: Chronic | ICD-10-CM

## 2023-08-21 DIAGNOSIS — Z01.818 ENCOUNTER FOR OTHER PREPROCEDURAL EXAMINATION: ICD-10-CM

## 2023-08-21 DIAGNOSIS — Z41.9 ENCOUNTER FOR PROCEDURE FOR PURPOSES OTHER THAN REMEDYING HEALTH STATE, UNSPECIFIED: Chronic | ICD-10-CM

## 2023-08-21 LAB
ANION GAP SERPL CALC-SCNC: 8 MMOL/L — SIGNIFICANT CHANGE UP (ref 5–17)
BUN SERPL-MCNC: 14 MG/DL — SIGNIFICANT CHANGE UP (ref 7–23)
CALCIUM SERPL-MCNC: 9.4 MG/DL — SIGNIFICANT CHANGE UP (ref 8.5–10.1)
CHLORIDE SERPL-SCNC: 105 MMOL/L — SIGNIFICANT CHANGE UP (ref 96–108)
CO2 SERPL-SCNC: 29 MMOL/L — SIGNIFICANT CHANGE UP (ref 22–31)
CREAT SERPL-MCNC: 0.74 MG/DL — SIGNIFICANT CHANGE UP (ref 0.5–1.3)
EGFR: 94 ML/MIN/1.73M2 — SIGNIFICANT CHANGE UP
GLUCOSE SERPL-MCNC: 85 MG/DL — SIGNIFICANT CHANGE UP (ref 70–99)
HCT VFR BLD CALC: 41.8 % — SIGNIFICANT CHANGE UP (ref 34.5–45)
HGB BLD-MCNC: 13.8 G/DL — SIGNIFICANT CHANGE UP (ref 11.5–15.5)
MCHC RBC-ENTMCNC: 31.2 PG — SIGNIFICANT CHANGE UP (ref 27–34)
MCHC RBC-ENTMCNC: 33 GM/DL — SIGNIFICANT CHANGE UP (ref 32–36)
MCV RBC AUTO: 94.6 FL — SIGNIFICANT CHANGE UP (ref 80–100)
NRBC # BLD: 0 /100 WBCS — SIGNIFICANT CHANGE UP (ref 0–0)
PLATELET # BLD AUTO: 257 K/UL — SIGNIFICANT CHANGE UP (ref 150–400)
POTASSIUM SERPL-MCNC: 4.1 MMOL/L — SIGNIFICANT CHANGE UP (ref 3.5–5.3)
POTASSIUM SERPL-SCNC: 4.1 MMOL/L — SIGNIFICANT CHANGE UP (ref 3.5–5.3)
RBC # BLD: 4.42 M/UL — SIGNIFICANT CHANGE UP (ref 3.8–5.2)
RBC # FLD: 13.4 % — SIGNIFICANT CHANGE UP (ref 10.3–14.5)
SODIUM SERPL-SCNC: 142 MMOL/L — SIGNIFICANT CHANGE UP (ref 135–145)
WBC # BLD: 6.84 K/UL — SIGNIFICANT CHANGE UP (ref 3.8–10.5)
WBC # FLD AUTO: 6.84 K/UL — SIGNIFICANT CHANGE UP (ref 3.8–10.5)

## 2023-08-21 PROCEDURE — G0463: CPT

## 2023-08-21 PROCEDURE — 86901 BLOOD TYPING SEROLOGIC RH(D): CPT

## 2023-08-21 PROCEDURE — 80048 BASIC METABOLIC PNL TOTAL CA: CPT

## 2023-08-21 PROCEDURE — 36415 COLL VENOUS BLD VENIPUNCTURE: CPT

## 2023-08-21 PROCEDURE — 86850 RBC ANTIBODY SCREEN: CPT

## 2023-08-21 PROCEDURE — 93010 ELECTROCARDIOGRAM REPORT: CPT

## 2023-08-21 PROCEDURE — 93005 ELECTROCARDIOGRAM TRACING: CPT

## 2023-08-21 PROCEDURE — 85027 COMPLETE CBC AUTOMATED: CPT

## 2023-08-21 PROCEDURE — 86900 BLOOD TYPING SEROLOGIC ABO: CPT

## 2023-08-21 RX ORDER — BUDESONIDE, GLYCOPYRROLATE, AND FORMOTEROL FUMARATE 160; 9; 4.8 UG/1; UG/1; UG/1
2 AEROSOL, METERED RESPIRATORY (INHALATION)
Qty: 0 | Refills: 0 | DISCHARGE

## 2023-08-21 RX ORDER — ALBUTEROL 90 UG/1
2 AEROSOL, METERED ORAL
Qty: 0 | Refills: 0 | DISCHARGE

## 2023-08-21 NOTE — H&P PST ADULT - NSICDXPASTMEDICALHX_GEN_ALL_CORE_FT
PAST MEDICAL HISTORY:  Hypothyroidism     OA (osteoarthritis)     Polyp of corpus uteri     Postmenopausal bleeding     Prediabetes

## 2023-08-21 NOTE — H&P PST ADULT - NSICDXPASTSURGICALHX_GEN_ALL_CORE_FT
PAST SURGICAL HISTORY:  Elective surgery (Right big toe joint replacement, )    H/O gastric sleeve     History of colonoscopy (2018)    History of D&C     History of left hip replacement     S/P  section ()

## 2023-08-21 NOTE — H&P PST ADULT - NSANTHOSAYNRD_GEN_A_CORE
Tested negative for ANISA after weight loss/No. ANISA screening performed.  STOP BANG Legend: 0-2 = LOW Risk; 3-4 = INTERMEDIATE Risk; 5-8 = HIGH Risk

## 2023-08-21 NOTE — H&P PST ADULT - PROBLEM SELECTOR PLAN 2
Labs - CBC, BMP, T&S and EKG  MC with PCP   Pre op instructions reviewed and given. Take routine Synthroid, am of surgery with sip of water. Instructed to hold and/or avoid other NSAIDs and OTC supplements. Tylenol is ok. Verbalized understanding

## 2023-08-21 NOTE — H&P PST ADULT - HISTORY OF PRESENT ILLNESS
57 yo female with HTN, Hypothyroidism and Prediabetes, Last A1c 5.6, on Ozempic for weight loss, presents to [facility] scheduled for a D&C hysteroscopy with myosure on  with Dr. Hobbs.  LMP , Reports H/O PMB. Recent endometrial biopsy showed "proliferative changes", as per patient. C/O cramping.

## 2023-08-30 ENCOUNTER — TRANSCRIPTION ENCOUNTER (OUTPATIENT)
Age: 58
End: 2023-08-30

## 2023-08-30 RX ORDER — SODIUM CHLORIDE 9 MG/ML
1000 INJECTION, SOLUTION INTRAVENOUS
Refills: 0 | Status: DISCONTINUED | OUTPATIENT
Start: 2023-08-31 | End: 2023-08-31

## 2023-08-31 ENCOUNTER — TRANSCRIPTION ENCOUNTER (OUTPATIENT)
Age: 58
End: 2023-08-31

## 2023-08-31 ENCOUNTER — OUTPATIENT (OUTPATIENT)
Dept: OUTPATIENT SERVICES | Facility: HOSPITAL | Age: 58
LOS: 1 days | End: 2023-08-31
Payer: COMMERCIAL

## 2023-08-31 VITALS
RESPIRATION RATE: 14 BRPM | DIASTOLIC BLOOD PRESSURE: 77 MMHG | HEART RATE: 69 BPM | WEIGHT: 227.96 LBS | TEMPERATURE: 99 F | OXYGEN SATURATION: 97 % | SYSTOLIC BLOOD PRESSURE: 135 MMHG | HEIGHT: 64 IN

## 2023-08-31 VITALS
SYSTOLIC BLOOD PRESSURE: 123 MMHG | DIASTOLIC BLOOD PRESSURE: 75 MMHG | HEART RATE: 63 BPM | OXYGEN SATURATION: 98 % | RESPIRATION RATE: 16 BRPM

## 2023-08-31 DIAGNOSIS — Z90.3 ACQUIRED ABSENCE OF STOMACH [PART OF]: Chronic | ICD-10-CM

## 2023-08-31 DIAGNOSIS — Z41.9 ENCOUNTER FOR PROCEDURE FOR PURPOSES OTHER THAN REMEDYING HEALTH STATE, UNSPECIFIED: Chronic | ICD-10-CM

## 2023-08-31 DIAGNOSIS — Z96.642 PRESENCE OF LEFT ARTIFICIAL HIP JOINT: Chronic | ICD-10-CM

## 2023-08-31 DIAGNOSIS — N95.0 POSTMENOPAUSAL BLEEDING: ICD-10-CM

## 2023-08-31 DIAGNOSIS — Z98.891 HISTORY OF UTERINE SCAR FROM PREVIOUS SURGERY: Chronic | ICD-10-CM

## 2023-08-31 DIAGNOSIS — Z98.890 OTHER SPECIFIED POSTPROCEDURAL STATES: Chronic | ICD-10-CM

## 2023-08-31 PROCEDURE — 58558 HYSTEROSCOPY BIOPSY: CPT

## 2023-08-31 PROCEDURE — 88305 TISSUE EXAM BY PATHOLOGIST: CPT | Mod: 26

## 2023-08-31 PROCEDURE — 88305 TISSUE EXAM BY PATHOLOGIST: CPT

## 2023-08-31 DEVICE — MYOSURE TISSUE REMOVAL DEVICE LITE: Type: IMPLANTABLE DEVICE | Status: FUNCTIONAL

## 2023-08-31 RX ORDER — OLMESARTAN MEDOXOMIL 5 MG/1
2 TABLET, FILM COATED ORAL
Refills: 0 | DISCHARGE

## 2023-08-31 RX ORDER — HYDROMORPHONE HYDROCHLORIDE 2 MG/ML
0.5 INJECTION INTRAMUSCULAR; INTRAVENOUS; SUBCUTANEOUS
Refills: 0 | Status: DISCONTINUED | OUTPATIENT
Start: 2023-08-31 | End: 2023-08-31

## 2023-08-31 RX ORDER — ONDANSETRON 8 MG/1
4 TABLET, FILM COATED ORAL ONCE
Refills: 0 | Status: COMPLETED | OUTPATIENT
Start: 2023-08-31 | End: 2023-08-31

## 2023-08-31 RX ORDER — OXYCODONE HYDROCHLORIDE 5 MG/1
5 TABLET ORAL ONCE
Refills: 0 | Status: DISCONTINUED | OUTPATIENT
Start: 2023-08-31 | End: 2023-08-31

## 2023-08-31 RX ORDER — LEVOTHYROXINE SODIUM 125 MCG
1 TABLET ORAL
Qty: 0 | Refills: 0 | DISCHARGE

## 2023-08-31 RX ORDER — SODIUM CHLORIDE 9 MG/ML
1000 INJECTION, SOLUTION INTRAVENOUS
Refills: 0 | Status: DISCONTINUED | OUTPATIENT
Start: 2023-08-31 | End: 2023-08-31

## 2023-08-31 RX ORDER — SEMAGLUTIDE 0.68 MG/ML
1 INJECTION, SOLUTION SUBCUTANEOUS
Refills: 0 | DISCHARGE

## 2023-08-31 RX ORDER — LEVOTHYROXINE SODIUM 125 MCG
137 TABLET ORAL DAILY
Refills: 0 | Status: DISCONTINUED | OUTPATIENT
Start: 2023-08-31 | End: 2023-09-15

## 2023-08-31 RX ORDER — CHOLECALCIFEROL (VITAMIN D3) 125 MCG
1 CAPSULE ORAL
Refills: 0 | DISCHARGE

## 2023-08-31 RX ORDER — ASCORBIC ACID 60 MG
2 TABLET,CHEWABLE ORAL
Refills: 0 | DISCHARGE

## 2023-08-31 RX ADMIN — ONDANSETRON 4 MILLIGRAM(S): 8 TABLET, FILM COATED ORAL at 16:08

## 2023-08-31 RX ADMIN — SODIUM CHLORIDE 75 MILLILITER(S): 9 INJECTION, SOLUTION INTRAVENOUS at 16:00

## 2023-08-31 NOTE — BRIEF OPERATIVE NOTE - OPERATION/FINDINGS
EUa revealed sl. enlarged uterus, no adnexal masses. Panniculus noted. Mons about overhung the vulva, making for difficult case. Vagina and CX normal. CX normal, uterus with no overt lesions but ? suspicion of atypical vessels in fundus in upper left anteriorly and right same mirror opposite side. No complications, all counts correct.

## 2023-08-31 NOTE — ASU DISCHARGE PLAN (ADULT/PEDIATRIC) - ACTIVITY LEVEL
for two weeks/No heavy lifting/Nothing per vagina/No tub baths/No douching/No tampons/No intercourse

## 2023-08-31 NOTE — ASU DISCHARGE PLAN (ADULT/PEDIATRIC) - NS MD DC FALL RISK RISK
For information on Fall & Injury Prevention, visit: [email or web address] OR  [email or web address] OR  https://www.cdc.gov/steadi/patient.html

## 2023-08-31 NOTE — ASU DISCHARGE PLAN (ADULT/PEDIATRIC) - ASU DC SPECIAL INSTRUCTIONSFT
Call me cell at [phone] at any need    Nothing per vagina, no douche, tub bath, no swimming, no sex all for two weeks

## 2023-08-31 NOTE — ASU DISCHARGE PLAN (ADULT/PEDIATRIC) - CARE PROVIDER_API CALL
Reviewed discharge information with patient. Patient verbalized understanding, no further questions or complaints at this time. Patient is alert and orientated x4, in no apparent distress, ambulating with steady gait. No IV in.  Instructed patient to return
Jen Hobbs)  Obstetrics and Gynecology  [address] [place] [address]  Phone: [phone]  Fax: [phone]  Follow Up Time:

## 2023-08-31 NOTE — BRIEF OPERATIVE NOTE - NSICDXBRIEFPROCEDURE_GEN_ALL_CORE_FT
PROCEDURES:  Hysteroscopy with dilation and curettage of uterus using MyoSure device 31-Aug-2023 15:44:15  Jen Hobbs

## 2023-09-02 NOTE — ED ADULT TRIAGE NOTE - HEIGHT IN INCHES
decreased ability to use arms for pushing/pulling/decreased ability to use legs for bridging/pushing
5
decreased ability to use legs for bridging/pushing

## 2023-09-07 LAB — SURGICAL PATHOLOGY STUDY: SIGNIFICANT CHANGE UP

## 2023-09-15 ASSESSMENT — HOOS JR
IMPORTED HOOS JR SCORE: 92.34
GOING UP OR DOWN STAIRS: MODERATE
IMPORTED FORM: YES
RISING FROM SITTING: MILD
WALKING ON UNEVEN SURFACE: MODERATE
WALKING ON UNEVEN SURFACE: SEVERE
HOOS JR RAW SCORE: 13
BENDING TO THE FLOOR TO PICK UP OBJECT: MODERATE
GOING UP OR DOWN STAIRS: MILD
LYING IN BED (TURNING OVER, MAINTAINING HIP POSITION): SEVERE
GOING UP OR DOWN STAIRS: MILD
BENDING TO THE FLOOR TO PICK UP OBJECT: MODERATE
BENDING TO THE FLOOR TO PICK UP OBJECT: MILD
IMPORTED HOOS JR SCORE: 61.82
HOOS JR RAW SCORE: 1
HOOS JR RAW SCORE: 13
IMPORTED HOOS JR SCORE: 67.52
WALKING ON UNEVEN SURFACE: MILD
IMPORTED LATERALITY: LEFT
WALKING ON UNEVEN SURFACE: SEVERE
RISING FROM SITTING: MODERATE
RISING FROM SITTING: MILD
LYING IN BED (TURNING OVER, MAINTAINING HIP POSITION): SEVERE
HOOS JR RAW SCORE: 9
LYING IN BED (TURNING OVER, MAINTAINING HIP POSITION): SEVERE
LYING IN BED (TURNING OVER, MAINTAINING HIP POSITION): SEVERE
RISING FROM SITTING: MODERATE
BENDING TO THE FLOOR TO PICK UP OBJECT: MODERATE
GOING UP OR DOWN STAIRS: MODERATE
HOOS JR RAW SCORE: 9
LYING IN BED (TURNING OVER, MAINTAINING HIP POSITION): MODERATE
IMPORTED HOOS JR SCORE: 49.86
IMPORTED FORM: YES
IMPORTED FORM: YES
IMPORTED HOOS JR SCORE: 61.82
BENDING TO THE FLOOR TO PICK UP OBJECT: MODERATE
IMPORTED HOOS JR SCORE: 67.52
IMPORTED HOOS JR SCORE: 92.34
WALKING ON UNEVEN SURFACE: MILD
IMPORTED LATERALITY: LEFT
GOING UP OR DOWN STAIRS: MODERATE
LYING IN BED (TURNING OVER, MAINTAINING HIP POSITION): MODERATE
SITTING: MILD
BENDING TO THE FLOOR TO PICK UP OBJECT: MILD
IMPORTED HOOS JR SCORE: 49.86
GOING UP OR DOWN STAIRS: MODERATE
WALKING ON UNEVEN SURFACE: MODERATE
HOOS JR RAW SCORE: 7
IMPORTED LATERALITY: LEFT
SITTING: MILD
IMPORTED FORM: YES
IMPORTED LATERALITY: LEFT
HOOS JR RAW SCORE: 1
HOOS JR RAW SCORE: 7

## 2023-10-20 PROBLEM — N95.0 POSTMENOPAUSAL BLEEDING: Chronic | Status: ACTIVE | Noted: 2023-08-21

## 2023-10-23 ENCOUNTER — APPOINTMENT (OUTPATIENT)
Dept: ULTRASOUND IMAGING | Facility: CLINIC | Age: 58
End: 2023-10-23
Payer: COMMERCIAL

## 2023-10-23 ENCOUNTER — OUTPATIENT (OUTPATIENT)
Dept: OUTPATIENT SERVICES | Facility: HOSPITAL | Age: 58
LOS: 1 days | End: 2023-10-23
Payer: COMMERCIAL

## 2023-10-23 DIAGNOSIS — Z90.3 ACQUIRED ABSENCE OF STOMACH [PART OF]: Chronic | ICD-10-CM

## 2023-10-23 DIAGNOSIS — Z41.9 ENCOUNTER FOR PROCEDURE FOR PURPOSES OTHER THAN REMEDYING HEALTH STATE, UNSPECIFIED: Chronic | ICD-10-CM

## 2023-10-23 DIAGNOSIS — Z00.8 ENCOUNTER FOR OTHER GENERAL EXAMINATION: ICD-10-CM

## 2023-10-23 DIAGNOSIS — Z96.642 PRESENCE OF LEFT ARTIFICIAL HIP JOINT: Chronic | ICD-10-CM

## 2023-10-23 DIAGNOSIS — Z98.890 OTHER SPECIFIED POSTPROCEDURAL STATES: Chronic | ICD-10-CM

## 2023-10-23 PROCEDURE — 76536 US EXAM OF HEAD AND NECK: CPT

## 2023-10-23 PROCEDURE — 76536 US EXAM OF HEAD AND NECK: CPT | Mod: 26

## 2024-03-18 NOTE — ED ADULT NURSE NOTE - SKIN TURGOR
Requested Prescriptions     Pending Prescriptions Disp Refills    NOVOLOG FLEXPEN 100 UNIT/ML Subcutaneous Solution Pen-injector [Pharmacy Med Name: NOVOLOG 100 UNIT/ML FLEXPEN]  1     Sig: USES UP TO 45 UNITS DAILY AS DIRECTED IN DIVIDED DOSES    TRESIBA FLEXTOUCH 100 UNIT/ML Subcutaneous Solution Pen-injector [Pharmacy Med Name: TRESIBA FLEXTOUCH 100 UNIT/ML]  0     Sig: UP TO 15 UNITS DAILY WITH DOSE TITRATION AS DIRECTED     Your appointments       Date & Time Appointment Department ([place] [place])    Apr 08, 2024 11:30 AM CDT Follow up - Extended with Luis Napoles MD [facility], [address] [place] [address] ([facility] [place])        Jul 08, 2024 11:00 AM CDT Procedure with Luis Hidalgo MD [facility], [facility] ([facility])        Aug 28, 2024 11:15 AM CDT Diabetes Pump follow up with Galina Obrien APRN [facility], [facility] ([facility])              [facility], [address] [place] [address]  [facility] [place]  [address]  [facility] [id number] [phone] [facility] 4  [address] [facility] [address]  [facility] [id number] [phone] [facility], [facility]  [address]  Critical access hospital [id number] [phone]          Last A1c value was 7.6% done 1/4/2024.    Refill 12/28/23, 1/11/24  LOV 2/28/24   resilient/elastic

## 2024-04-02 ENCOUNTER — NON-APPOINTMENT (OUTPATIENT)
Age: 59
End: 2024-04-02

## 2024-04-02 DIAGNOSIS — Z86.79 PERSONAL HISTORY OF OTHER DISEASES OF THE CIRCULATORY SYSTEM: ICD-10-CM

## 2024-04-02 RX ORDER — SEMAGLUTIDE 2.68 MG/ML
INJECTION, SOLUTION SUBCUTANEOUS
Refills: 0 | Status: ACTIVE | COMMUNITY

## 2024-04-25 ENCOUNTER — APPOINTMENT (OUTPATIENT)
Dept: PODIATRY | Facility: CLINIC | Age: 59
End: 2024-04-25
Payer: COMMERCIAL

## 2024-04-25 DIAGNOSIS — E03.9 HYPOTHYROIDISM, UNSPECIFIED: ICD-10-CM

## 2024-04-25 DIAGNOSIS — Z80.8 FAMILY HISTORY OF MALIGNANT NEOPLASM OF OTHER ORGANS OR SYSTEMS: ICD-10-CM

## 2024-04-25 DIAGNOSIS — Z87.09 PERSONAL HISTORY OF OTHER DISEASES OF THE RESPIRATORY SYSTEM: ICD-10-CM

## 2024-04-25 DIAGNOSIS — Z83.511 FAMILY HISTORY OF GLAUCOMA: ICD-10-CM

## 2024-04-25 DIAGNOSIS — Z78.9 OTHER SPECIFIED HEALTH STATUS: ICD-10-CM

## 2024-04-25 DIAGNOSIS — Z82.49 FAMILY HISTORY OF ISCHEMIC HEART DISEASE AND OTHER DISEASES OF THE CIRCULATORY SYSTEM: ICD-10-CM

## 2024-04-25 DIAGNOSIS — Z83.3 FAMILY HISTORY OF DIABETES MELLITUS: ICD-10-CM

## 2024-04-25 PROCEDURE — 99213 OFFICE O/P EST LOW 20 MIN: CPT

## 2024-04-25 NOTE — ASSESSMENT
[FreeTextEntry1] : Exam. Mycotic toe nails debrided with manual cutters and electrical  to patient tolerance. Instructed the patient to apply Formula 7 daily. Instructed the patient to dry feet and toe nails well, change wet or damp socks and shoes to dry ones. Patient demonstrated verbal understanding of all instructions.

## 2024-04-25 NOTE — HISTORY OF PRESENT ILLNESS
[FreeTextEntry1] : 59 year old female presents to office for follow up care of a fungus infection in her toe nails. Patient states she has been applying Formula 7 daily and that she sees improvement.

## 2024-04-25 NOTE — PHYSICAL EXAM
[General Appearance - Alert] : alert [General Appearance - In No Acute Distress] : in no acute distress [General Appearance - Well Nourished] : well nourished [de-identified] :  no pain on palpation of feet b/l, no pain on ROM of foot and ankle b/l, no structural deformities noted b/l [FreeTextEntry1] :  3 mycotic toe nails, right 1st toenail, left 1st toenail and left 2nd toenail,  each brittle, discolored and thickened, with proximal clearing noted

## 2024-05-14 ENCOUNTER — APPOINTMENT (OUTPATIENT)
Dept: PODIATRY | Facility: CLINIC | Age: 59
End: 2024-05-14
Payer: COMMERCIAL

## 2024-05-14 PROCEDURE — 99213 OFFICE O/P EST LOW 20 MIN: CPT

## 2024-05-14 NOTE — PHYSICAL EXAM
[General Appearance - Alert] : alert [General Appearance - In No Acute Distress] : in no acute distress [General Appearance - Well Nourished] : well nourished [de-identified] : No pain on palpation of feet b/l, no pain on ROM of foot and ankle b/l. No structural deformities noted b/l [FreeTextEntry1] : 3 mycotic toe nails, right 1st toenail, left 1st toenail and left 2nd toenail, brittle, discolored and thickened, proximal clearing noted.

## 2024-05-14 NOTE — ASSESSMENT
[FreeTextEntry1] : Examination. Mycotic toe nails debrided with manual cutters and electrical  to patient tolerance. Instructed patient to continue to apply Formula 7 daily. Instructed patient to dry feet and toe nails well, change wet or damp socks and shoes to dry ones. Patient demonstrated verbal understanding of all instructions.

## 2024-05-14 NOTE — REVIEW OF SYSTEMS
[Negative] : Constitutional [FreeTextEntry9] : back pain at times [de-identified] : fungal toe nails

## 2024-05-14 NOTE — HISTORY OF PRESENT ILLNESS
[FreeTextEntry1] : This 59 year old female patient returns today for continued care of a fungus infection in her toe nails. She states she has been applying Formula 7 as instructed and that she sees improvement.

## 2024-06-03 ENCOUNTER — NON-APPOINTMENT (OUTPATIENT)
Age: 59
End: 2024-06-03

## 2024-06-20 ENCOUNTER — APPOINTMENT (OUTPATIENT)
Dept: PODIATRY | Facility: CLINIC | Age: 59
End: 2024-06-20

## 2024-06-20 DIAGNOSIS — M25.572 PAIN IN RIGHT ANKLE AND JOINTS OF RIGHT FOOT: ICD-10-CM

## 2024-06-20 DIAGNOSIS — M79.671 PAIN IN RIGHT FOOT: ICD-10-CM

## 2024-06-20 DIAGNOSIS — B35.1 TINEA UNGUIUM: ICD-10-CM

## 2024-06-20 DIAGNOSIS — M25.571 PAIN IN RIGHT ANKLE AND JOINTS OF RIGHT FOOT: ICD-10-CM

## 2024-06-20 DIAGNOSIS — M79.672 PAIN IN RIGHT FOOT: ICD-10-CM

## 2024-06-20 DIAGNOSIS — R22.40 LOCALIZED SWELLING, MASS AND LUMP, UNSPECIFIED LOWER LIMB: ICD-10-CM

## 2024-06-20 PROCEDURE — 99213 OFFICE O/P EST LOW 20 MIN: CPT | Mod: 25

## 2024-06-20 PROCEDURE — 73600 X-RAY EXAM OF ANKLE: CPT | Mod: 50,59

## 2024-06-20 PROCEDURE — 73620 X-RAY EXAM OF FOOT: CPT | Mod: 50,59

## 2024-06-20 NOTE — HISTORY OF PRESENT ILLNESS
[FreeTextEntry1] : 59 year old female patient returns today for continued care of a fungus infection in her toe nails. She states she has been applying Formula 7 as instructed and that she sees improvement. Patient states she has noticed the area on the outside of both her ankles have been becoming more swollen, looking bigger over the past month and those areas are now having pain. She states it is a mild pain to the ankles and is not always present, but as it continues on the days it happens she starts to have pain in the feet near the areas at the ankle. She states she takes tyelnol sometimes which helps the pain, and sometimes applies to her ankles her arthritis cream she buys for her knees which she states also helps.

## 2024-06-20 NOTE — ASSESSMENT
[FreeTextEntry1] : Examination. Mycotic toe nails debrided with manual cutters and electrical  to patient tolerance. Instructed patient to continue to apply Formula 7 daily. Instructed patient to dry feet and toe nails well, change wet or damp socks and shoes to dry ones. Xrays obtained, 2 views foot  AP and Lat b/l, 2 views ankle AP and Lat b/l, reviewed and discussed with the patient. Rx diagnostic US for b/l ankle. Instructed patient to get US done as soon as possible. Instructed patient to take tylenol prn for pain. Instructed patient to monitor both akne areas for any changes in size, shape and pain and to call our office if they arise. Patient demonstrated verbal understanding of all instructions.

## 2024-06-20 NOTE — REVIEW OF SYSTEMS
[Negative] : Constitutional [FreeTextEntry9] : back pain at times, pain and swelling to outside of ankles [de-identified] : fungal toe nails

## 2024-06-20 NOTE — PHYSICAL EXAM
[General Appearance - Alert] : alert [General Appearance - In No Acute Distress] : in no acute distress [General Appearance - Well Nourished] : well nourished [Sensation] : the sensory exam was normal to light touch and pinprick [de-identified] : Minimal pain on palpation of lateral ankle in area of lateral malleolus b/l with no discrete or circumscribed mass noted but each area b/l noted to be soft and swollen, feeling consistent with fat deposits, mild discomfort to lateral dorsal proximal feet b/l, just distal to lateral ankle b/l, no pain on ROM of foot and ankle b/l. No structural deformities noted b/l. Xrays 2 views AP and Lat foot b/l and 2 views AP and Lat b/l ankle, no acute fractures noted b/l, soft tissue edema noted to b/l lateral malleolous. [FreeTextEntry1] : 3 mycotic toe nails, right 1st toenail, left 1st toenail and left 2nd toenail, brittle, discolored and thickened, each with proximal clearing noted.

## 2024-06-27 ENCOUNTER — APPOINTMENT (OUTPATIENT)
Dept: VASCULAR SURGERY | Facility: CLINIC | Age: 59
End: 2024-06-27

## 2024-06-27 ENCOUNTER — APPOINTMENT (OUTPATIENT)
Dept: ULTRASOUND IMAGING | Facility: CLINIC | Age: 59
End: 2024-06-27

## 2024-07-11 ENCOUNTER — APPOINTMENT (OUTPATIENT)
Dept: PODIATRY | Facility: CLINIC | Age: 59
End: 2024-07-11
Payer: COMMERCIAL

## 2024-07-11 DIAGNOSIS — B35.1 TINEA UNGUIUM: ICD-10-CM

## 2024-07-11 PROCEDURE — 99213 OFFICE O/P EST LOW 20 MIN: CPT

## 2024-07-11 NOTE — ED ADULT NURSE NOTE - NSIMPLEMENTINTERV_GEN_ALL_ED
Her/She
Implemented All Universal Safety Interventions:  [place] to call system. Call bell, personal items and telephone within reach. Instruct patient to call for assistance. Room bathroom lighting operational. Non-slip footwear when patient is off stretcher. Physically safe environment: no spills, clutter or unnecessary equipment. Stretcher in lowest position, wheels locked, appropriate side rails in place.

## 2024-07-18 ENCOUNTER — APPOINTMENT (OUTPATIENT)
Dept: WOUND CARE | Facility: HOSPITAL | Age: 59
End: 2024-07-18
Payer: COMMERCIAL

## 2024-07-18 ENCOUNTER — OUTPATIENT (OUTPATIENT)
Dept: OUTPATIENT SERVICES | Facility: HOSPITAL | Age: 59
LOS: 1 days | Discharge: HOME | End: 2024-07-18
Payer: COMMERCIAL

## 2024-07-18 VITALS
WEIGHT: 220 LBS | RESPIRATION RATE: 18 BRPM | SYSTOLIC BLOOD PRESSURE: 121 MMHG | DIASTOLIC BLOOD PRESSURE: 81 MMHG | HEIGHT: 62 IN | OXYGEN SATURATION: 99 % | BODY MASS INDEX: 40.48 KG/M2 | HEART RATE: 73 BPM | TEMPERATURE: 98.2 F

## 2024-07-18 DIAGNOSIS — Z41.9 ENCOUNTER FOR PROCEDURE FOR PURPOSES OTHER THAN REMEDYING HEALTH STATE, UNSPECIFIED: Chronic | ICD-10-CM

## 2024-07-18 DIAGNOSIS — D17.23 BENIGN LIPOMATOUS NEOPLASM OF SKIN AND SUBCUTANEOUS TISSUE OF RIGHT LEG: ICD-10-CM

## 2024-07-18 DIAGNOSIS — Z96.642 PRESENCE OF LEFT ARTIFICIAL HIP JOINT: Chronic | ICD-10-CM

## 2024-07-18 DIAGNOSIS — Z98.890 OTHER SPECIFIED POSTPROCEDURAL STATES: Chronic | ICD-10-CM

## 2024-07-18 DIAGNOSIS — Z90.3 ACQUIRED ABSENCE OF STOMACH [PART OF]: Chronic | ICD-10-CM

## 2024-07-18 DIAGNOSIS — M79.673 PAIN IN UNSPECIFIED FOOT: ICD-10-CM

## 2024-07-18 DIAGNOSIS — D17.20 BENIGN LIPOMATOUS NEOPLASM OF SKIN AND SUBCUTANEOUS TISSUE OF UNSPECIFIED LIMB: ICD-10-CM

## 2024-07-18 DIAGNOSIS — Z98.891 HISTORY OF UTERINE SCAR FROM PREVIOUS SURGERY: Chronic | ICD-10-CM

## 2024-07-18 DIAGNOSIS — D17.24 BENIGN LIPOMATOUS NEOPLASM OF SKIN AND SUBCUTANEOUS TISSUE OF LEFT LEG: ICD-10-CM

## 2024-07-18 PROCEDURE — 99213 OFFICE O/P EST LOW 20 MIN: CPT

## 2024-07-18 PROCEDURE — G0463: CPT

## 2024-07-18 RX ORDER — ACETAMINOPHEN 325 MG
TABLET ORAL
Refills: 0 | Status: ACTIVE | COMMUNITY

## 2024-07-18 RX ORDER — UBIDECARENONE/VIT E ACET 100MG-5
CAPSULE ORAL
Refills: 0 | Status: ACTIVE | COMMUNITY

## 2024-07-18 RX ORDER — MULTIVIT-MIN/IRON/FOLIC ACID/K 18-600-40
CAPSULE ORAL
Refills: 0 | Status: ACTIVE | COMMUNITY

## 2024-07-18 RX ORDER — CALCIUM CARBONATE 500(1250)
500 TABLET ORAL
Refills: 0 | Status: ACTIVE | COMMUNITY

## 2024-07-18 RX ORDER — ASCORBIC ACID 500 MG
500 TABLET ORAL
Refills: 0 | Status: ACTIVE | COMMUNITY

## 2024-07-19 PROBLEM — D17.20 LIPOMA OF EXTREMITY: Status: ACTIVE | Noted: 2024-07-18

## 2024-07-19 PROBLEM — D17.23 LIPOMA OF RIGHT LOWER EXTREMITY: Status: ACTIVE | Noted: 2024-07-19

## 2024-07-19 PROBLEM — D17.24 LIPOMA OF LEFT LOWER EXTREMITY: Status: ACTIVE | Noted: 2024-07-19

## 2024-07-23 DIAGNOSIS — Z98.84 BARIATRIC SURGERY STATUS: ICD-10-CM

## 2024-07-23 DIAGNOSIS — E03.9 HYPOTHYROIDISM, UNSPECIFIED: ICD-10-CM

## 2024-07-23 DIAGNOSIS — Z87.09 PERSONAL HISTORY OF OTHER DISEASES OF THE RESPIRATORY SYSTEM: ICD-10-CM

## 2024-07-23 DIAGNOSIS — D17.23 BENIGN LIPOMATOUS NEOPLASM OF SKIN AND SUBCUTANEOUS TISSUE OF RIGHT LEG: ICD-10-CM

## 2024-07-23 DIAGNOSIS — I73.00 RAYNAUD'S SYNDROME WITHOUT GANGRENE: ICD-10-CM

## 2024-07-23 DIAGNOSIS — Z83.511 FAMILY HISTORY OF GLAUCOMA: ICD-10-CM

## 2024-07-23 DIAGNOSIS — Z96.649 PRESENCE OF UNSPECIFIED ARTIFICIAL HIP JOINT: ICD-10-CM

## 2024-07-23 DIAGNOSIS — Z82.49 FAMILY HISTORY OF ISCHEMIC HEART DISEASE AND OTHER DISEASES OF THE CIRCULATORY SYSTEM: ICD-10-CM

## 2024-07-23 DIAGNOSIS — G47.33 OBSTRUCTIVE SLEEP APNEA (ADULT) (PEDIATRIC): ICD-10-CM

## 2024-07-23 DIAGNOSIS — Z98.890 OTHER SPECIFIED POSTPROCEDURAL STATES: ICD-10-CM

## 2024-07-23 DIAGNOSIS — D17.24 BENIGN LIPOMATOUS NEOPLASM OF SKIN AND SUBCUTANEOUS TISSUE OF LEFT LEG: ICD-10-CM

## 2024-07-23 DIAGNOSIS — I10 ESSENTIAL (PRIMARY) HYPERTENSION: ICD-10-CM

## 2024-07-23 DIAGNOSIS — Z86.16 PERSONAL HISTORY OF COVID-19: ICD-10-CM

## 2024-07-23 DIAGNOSIS — Z80.41 FAMILY HISTORY OF MALIGNANT NEOPLASM OF OVARY: ICD-10-CM

## 2024-07-23 DIAGNOSIS — Z83.3 FAMILY HISTORY OF DIABETES MELLITUS: ICD-10-CM

## 2024-07-23 DIAGNOSIS — Z80.8 FAMILY HISTORY OF MALIGNANT NEOPLASM OF OTHER ORGANS OR SYSTEMS: ICD-10-CM

## 2024-07-25 ENCOUNTER — APPOINTMENT (OUTPATIENT)
Dept: PODIATRY | Facility: CLINIC | Age: 59
End: 2024-07-25
Payer: COMMERCIAL

## 2024-07-25 DIAGNOSIS — L60.0 INGROWING NAIL: ICD-10-CM

## 2024-07-25 PROBLEM — M79.672 PAIN IN LEFT FOOT: Chronic | Status: ACTIVE | Noted: 2024-07-24

## 2024-07-25 PROBLEM — B35.1 TINEA UNGUIUM: Chronic | Status: ACTIVE | Noted: 2024-07-24

## 2024-07-25 PROBLEM — U07.1 COVID-19: Chronic | Status: ACTIVE | Noted: 2024-07-24

## 2024-07-25 PROBLEM — M18.11 UNILATERAL PRIMARY OSTEOARTHRITIS OF FIRST CARPOMETACARPAL JOINT, RIGHT HAND: Chronic | Status: ACTIVE | Noted: 2024-07-24

## 2024-07-25 PROBLEM — R22.40 LOCALIZED SWELLING, MASS AND LUMP, UNSPECIFIED LOWER LIMB: Chronic | Status: ACTIVE | Noted: 2024-07-24

## 2024-07-25 PROBLEM — R94.31 ABNORMAL ELECTROCARDIOGRAM [ECG] [EKG]: Chronic | Status: ACTIVE | Noted: 2024-07-24

## 2024-07-25 PROBLEM — J45.909 UNSPECIFIED ASTHMA, UNCOMPLICATED: Chronic | Status: ACTIVE | Noted: 2024-07-24

## 2024-07-25 PROBLEM — M70.62 TROCHANTERIC BURSITIS, LEFT HIP: Chronic | Status: ACTIVE | Noted: 2024-07-24

## 2024-07-25 PROBLEM — I73.00 RAYNAUD'S SYNDROME WITHOUT GANGRENE: Chronic | Status: ACTIVE | Noted: 2024-07-24

## 2024-07-25 PROBLEM — M25.571 PAIN IN RIGHT ANKLE AND JOINTS OF RIGHT FOOT: Chronic | Status: ACTIVE | Noted: 2024-07-24

## 2024-07-25 PROBLEM — G47.33 OBSTRUCTIVE SLEEP APNEA (ADULT) (PEDIATRIC): Chronic | Status: ACTIVE | Noted: 2024-07-24

## 2024-07-25 PROBLEM — M79.646 PAIN IN UNSPECIFIED FINGER(S): Chronic | Status: ACTIVE | Noted: 2024-07-24

## 2024-07-25 PROBLEM — N84.1 POLYP OF CERVIX UTERI: Chronic | Status: ACTIVE | Noted: 2024-07-24

## 2024-07-25 PROBLEM — M17.0 BILATERAL PRIMARY OSTEOARTHRITIS OF KNEE: Chronic | Status: ACTIVE | Noted: 2024-07-24

## 2024-07-25 PROCEDURE — 99213 OFFICE O/P EST LOW 20 MIN: CPT

## 2024-07-28 PROBLEM — L60.0 INGROWN NAIL: Status: ACTIVE | Noted: 2024-07-28

## 2024-07-28 RX ORDER — TIRZEPATIDE 5 MG/.5ML
5 INJECTION, SOLUTION SUBCUTANEOUS
Qty: 2 | Refills: 0 | Status: ACTIVE | COMMUNITY
Start: 2024-07-19

## 2024-07-28 NOTE — ASSESSMENT
[FreeTextEntry1] : Examination. Aseptic preparation of the right hallux with betadine. Slant back excision of the affected ingrown nail border performed with sterile instrumentation, no drainage expressed. Trimmed all toe nails with sterile nippers to patient's tolerance. Instructed patient to call out office if any problems arise. Instructed throatiest to continue to apply the topival antifungal medication as prescribed. Patient states she followed up with Dr. Brandon for surgical evaluation for right lipoma, which is scheduled for next weeks. Patient demonstrated verbal understanding of all instructions. Patient to return to office in 1 month.

## 2024-07-28 NOTE — REVIEW OF SYSTEMS
[Negative] : Constitutional [FreeTextEntry9] : back pain at times, pain and swelling to outside of ankles [de-identified] : fungal toe nails, painful ingrown toe nail

## 2024-07-28 NOTE — PHYSICAL EXAM
[General Appearance - Alert] : alert [General Appearance - In No Acute Distress] : in no acute distress [General Appearance - Well Nourished] : well nourished [Sensation] : the sensory exam was normal to light touch and pinprick [de-identified] : Minimal pain on palpation of lateral ankle in area of lateral malleolus b/l with no discrete or circumscribed mass noted but each area b/l noted to be soft and swollen, feeling consistent with fat deposits, mild discomfort to lateral dorsal proximal feet b/l, just distal to lateral ankle b/l. No pain on ROM of foot and ankle b/l. No structural deformities noted b/l. [FreeTextEntry1] : Pain on palpation to ingrown toe nail border, right hallux nail medial border, with edema, localized erythema not streaking, no purulence expressed, glenys drainage. 3 mycotic toe nails, noted to right 1st toenail, left 1st toenail and left 2nd toenail, brittle, discolored and thickened, each with clinical improvement, with proximal clearing noted. All remaining toe nail plates noted to be clear, with noo ingrown toe nails noted- no pain upon trim and examination.

## 2024-07-28 NOTE — HISTORY OF PRESENT ILLNESS
[FreeTextEntry1] : 59 year old female patient presents for complaint of painful toe nails. She states he wants the toe nails checked, states her right big toe nails is painful, feels like the area may have been rubbing, and states the nonfungal toe nails also feel like they have been rubbing in one of her shoes.. Patient states she went for surgical consult and is scheduled for right ankle lipoma removal surgery next week.

## 2024-07-29 NOTE — ASU PATIENT PROFILE, ADULT - FALL HARM RISK - UNIVERSAL INTERVENTIONS
Bed in lowest position, wheels locked, appropriate side rails in place/Call bell, personal items and telephone in reach/Instruct patient to call for assistance before getting out of bed or chair/Non-slip footwear when patient is out of bed/[employer] to call system/Physically safe environment - no spills, clutter or unnecessary equipment/Purposeful Proactive Rounding/Room/bathroom lighting operational, light cord in reach

## 2024-07-30 ENCOUNTER — TRANSCRIPTION ENCOUNTER (OUTPATIENT)
Age: 59
End: 2024-07-30

## 2024-07-30 ENCOUNTER — APPOINTMENT (OUTPATIENT)
Dept: WOUND CARE | Facility: HOSPITAL | Age: 59
End: 2024-07-30

## 2024-07-30 ENCOUNTER — OUTPATIENT (OUTPATIENT)
Dept: OUTPATIENT SERVICES | Facility: HOSPITAL | Age: 59
LOS: 1 days | End: 2024-07-30
Payer: COMMERCIAL

## 2024-07-30 VITALS
OXYGEN SATURATION: 97 % | TEMPERATURE: 98 F | HEART RATE: 63 BPM | SYSTOLIC BLOOD PRESSURE: 112 MMHG | HEIGHT: 63 IN | RESPIRATION RATE: 14 BRPM | WEIGHT: 220.9 LBS | DIASTOLIC BLOOD PRESSURE: 69 MMHG

## 2024-07-30 VITALS
SYSTOLIC BLOOD PRESSURE: 114 MMHG | TEMPERATURE: 97 F | HEART RATE: 60 BPM | RESPIRATION RATE: 17 BRPM | OXYGEN SATURATION: 100 % | DIASTOLIC BLOOD PRESSURE: 69 MMHG

## 2024-07-30 DIAGNOSIS — Z90.3 ACQUIRED ABSENCE OF STOMACH [PART OF]: Chronic | ICD-10-CM

## 2024-07-30 DIAGNOSIS — Z41.9 ENCOUNTER FOR PROCEDURE FOR PURPOSES OTHER THAN REMEDYING HEALTH STATE, UNSPECIFIED: Chronic | ICD-10-CM

## 2024-07-30 DIAGNOSIS — Z98.890 OTHER SPECIFIED POSTPROCEDURAL STATES: Chronic | ICD-10-CM

## 2024-07-30 DIAGNOSIS — Z96.642 PRESENCE OF LEFT ARTIFICIAL HIP JOINT: Chronic | ICD-10-CM

## 2024-07-30 DIAGNOSIS — R22.40 LOCALIZED SWELLING, MASS AND LUMP, UNSPECIFIED LOWER LIMB: ICD-10-CM

## 2024-07-30 DIAGNOSIS — Z98.891 HISTORY OF UTERINE SCAR FROM PREVIOUS SURGERY: Chronic | ICD-10-CM

## 2024-07-30 PROCEDURE — 88304 TISSUE EXAM BY PATHOLOGIST: CPT

## 2024-07-30 PROCEDURE — 97161 PT EVAL LOW COMPLEX 20 MIN: CPT

## 2024-07-30 PROCEDURE — 27632 EXC LEG/ANKLE LES SC 3 CM/>: CPT | Mod: RT

## 2024-07-30 PROCEDURE — 88304 TISSUE EXAM BY PATHOLOGIST: CPT | Mod: 26

## 2024-07-30 RX ORDER — DEXTROSE MONOHYDRATE, SODIUM CHLORIDE, SODIUM LACTATE, CALCIUM CHLORIDE, MAGNESIUM CHLORIDE 1.5; 538; 448; 18.4; 5.08 G/100ML; MG/100ML; MG/100ML; MG/100ML; MG/100ML
1000 SOLUTION INTRAPERITONEAL
Refills: 0 | Status: DISCONTINUED | OUTPATIENT
Start: 2024-07-30 | End: 2024-07-30

## 2024-07-30 RX ORDER — TETRACAINE HCL 0.5 %
1 DROPS OPHTHALMIC (EYE) ONCE
Refills: 0 | Status: COMPLETED | OUTPATIENT
Start: 2024-07-30 | End: 2024-07-30

## 2024-07-30 RX ORDER — OXYCODONE HYDROCHLORIDE 30 MG/1
5 TABLET ORAL ONCE
Refills: 0 | Status: DISCONTINUED | OUTPATIENT
Start: 2024-07-30 | End: 2024-07-30

## 2024-07-30 RX ORDER — CEFAZOLIN SODIUM 10 G
2000 VIAL (EA) INJECTION ONCE
Refills: 0 | Status: COMPLETED | OUTPATIENT
Start: 2024-07-30 | End: 2024-07-30

## 2024-07-30 RX ORDER — HYDROMORPHONE HCL IN 0.9% NACL 0.2 MG/ML
0.5 PLASTIC BAG, INJECTION (ML) INTRAVENOUS
Refills: 0 | Status: DISCONTINUED | OUTPATIENT
Start: 2024-07-30 | End: 2024-07-30

## 2024-07-30 RX ORDER — CEPHALEXIN 250 MG/1
250 TABLET ORAL EVERY 6 HOURS
Qty: 28 | Refills: 0 | Status: ACTIVE | COMMUNITY
Start: 2024-07-30 | End: 1900-01-01

## 2024-07-30 RX ORDER — ONDANSETRON HCL/PF 4 MG/2 ML
4 VIAL (ML) INJECTION ONCE
Refills: 0 | Status: DISCONTINUED | OUTPATIENT
Start: 2024-07-30 | End: 2024-07-30

## 2024-07-30 RX ORDER — BUPIVACAINE 13.3 MG/ML
20 INJECTION, SUSPENSION, LIPOSOMAL INFILTRATION ONCE
Refills: 0 | Status: DISCONTINUED | OUTPATIENT
Start: 2024-07-30 | End: 2024-07-30

## 2024-07-30 RX ORDER — OXYCODONE 5 MG/1
5 TABLET ORAL EVERY 4 HOURS
Qty: 42 | Refills: 0 | Status: ACTIVE | COMMUNITY
Start: 2024-07-30 | End: 1900-01-01

## 2024-07-30 RX ADMIN — DEXTROSE MONOHYDRATE, SODIUM CHLORIDE, SODIUM LACTATE, CALCIUM CHLORIDE, MAGNESIUM CHLORIDE 75 MILLILITER(S): 1.5; 538; 448; 18.4; 5.08 SOLUTION INTRAPERITONEAL at 10:41

## 2024-07-30 RX ADMIN — DEXTROSE MONOHYDRATE, SODIUM CHLORIDE, SODIUM LACTATE, CALCIUM CHLORIDE, MAGNESIUM CHLORIDE 75 MILLILITER(S): 1.5; 538; 448; 18.4; 5.08 SOLUTION INTRAPERITONEAL at 07:27

## 2024-07-30 RX ADMIN — Medication 1 DROP(S): at 11:02

## 2024-07-30 NOTE — PROGRESS NOTE ADULT - SUBJECTIVE AND OBJECTIVE BOX
Patient complaints of Right eye tin feeling. Gave her a drop of tetracaine eye drops . No change in the sensation. Irrigated with saline right eye . No change the patient said  . Will observe for some more time.

## 2024-07-30 NOTE — ASU DISCHARGE PLAN (ADULT/PEDIATRIC) - PATIENT EDUCATION MATERIALS PROVIED
Anesthesia side effects/Recovery general education sheet/Pre-printed instructions given for other (specify)

## 2024-07-30 NOTE — PHYSICAL THERAPY INITIAL EVALUATION ADULT - ADDITIONAL COMMENTS
Patient lives in private home, +[facility], and will be residing on first floor upon discharge home. Patient was independent in all ADLs and ambulated independently without device.

## 2024-07-30 NOTE — BRIEF OPERATIVE NOTE - NSICDXBRIEFPROCEDURE_GEN_ALL_CORE_FT
PROCEDURES:  Excision, soft tissue tumor, ankle area, subfascial, 5 cm or greater 30-Jul-2024 09:35:46  Gerardo Brandon

## 2024-07-30 NOTE — ASU DISCHARGE PLAN (ADULT/PEDIATRIC) - CARE PROVIDER_API CALL
Gerardo Brandon-Dax  Foot Surgery  [address] [place] [id number]  Phone: [phone]  Fax: [phone]  Follow Up Time: 1 week

## 2024-07-30 NOTE — ASU DISCHARGE PLAN (ADULT/PEDIATRIC) - NURSING INSTRUCTIONS
Elevated right ankle/foot with pillow and apply ice over bandage 20 minutes on and 20 minutes off to help with swelling/discomfort.

## 2024-08-02 ENCOUNTER — APPOINTMENT (OUTPATIENT)
Dept: WOUND CARE | Facility: HOSPITAL | Age: 59
End: 2024-08-02

## 2024-08-02 ENCOUNTER — OUTPATIENT (OUTPATIENT)
Dept: OUTPATIENT SERVICES | Facility: HOSPITAL | Age: 59
LOS: 1 days | Discharge: HOME | End: 2024-08-02
Payer: COMMERCIAL

## 2024-08-02 VITALS
RESPIRATION RATE: 18 BRPM | SYSTOLIC BLOOD PRESSURE: 135 MMHG | OXYGEN SATURATION: 98 % | TEMPERATURE: 97.8 F | DIASTOLIC BLOOD PRESSURE: 87 MMHG | HEIGHT: 62 IN | WEIGHT: 220 LBS | HEART RATE: 68 BPM | BODY MASS INDEX: 40.48 KG/M2

## 2024-08-02 VITALS
HEART RATE: 68 BPM | TEMPERATURE: 97.8 F | HEIGHT: 62 IN | OXYGEN SATURATION: 98 % | BODY MASS INDEX: 40.48 KG/M2 | RESPIRATION RATE: 18 BRPM | SYSTOLIC BLOOD PRESSURE: 135 MMHG | DIASTOLIC BLOOD PRESSURE: 87 MMHG | WEIGHT: 220 LBS

## 2024-08-02 DIAGNOSIS — Z98.890 OTHER SPECIFIED POSTPROCEDURAL STATES: Chronic | ICD-10-CM

## 2024-08-02 DIAGNOSIS — Z41.9 ENCOUNTER FOR PROCEDURE FOR PURPOSES OTHER THAN REMEDYING HEALTH STATE, UNSPECIFIED: Chronic | ICD-10-CM

## 2024-08-02 DIAGNOSIS — Z98.891 HISTORY OF UTERINE SCAR FROM PREVIOUS SURGERY: Chronic | ICD-10-CM

## 2024-08-02 DIAGNOSIS — Z09 ENCOUNTER FOR FOLLOW-UP EXAMINATION AFTER COMPLETED TREATMENT FOR CONDITIONS OTHER THAN MALIGNANT NEOPLASM: ICD-10-CM

## 2024-08-02 DIAGNOSIS — Z96.642 PRESENCE OF LEFT ARTIFICIAL HIP JOINT: Chronic | ICD-10-CM

## 2024-08-02 DIAGNOSIS — Z90.3 ACQUIRED ABSENCE OF STOMACH [PART OF]: Chronic | ICD-10-CM

## 2024-08-02 PROCEDURE — 99024 POSTOP FOLLOW-UP VISIT: CPT

## 2024-08-02 PROCEDURE — G0463: CPT

## 2024-08-07 DIAGNOSIS — I10 ESSENTIAL (PRIMARY) HYPERTENSION: ICD-10-CM

## 2024-08-07 DIAGNOSIS — G47.33 OBSTRUCTIVE SLEEP APNEA (ADULT) (PEDIATRIC): ICD-10-CM

## 2024-08-07 DIAGNOSIS — E03.9 HYPOTHYROIDISM, UNSPECIFIED: ICD-10-CM
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
## 2024-08-07 DIAGNOSIS — Z98.84 BARIATRIC SURGERY STATUS: ICD-10-CM

## 2024-08-07 DIAGNOSIS — I73.00 RAYNAUD'S SYNDROME WITHOUT GANGRENE: ICD-10-CM

## 2024-08-07 DIAGNOSIS — Z87.09 PERSONAL HISTORY OF OTHER DISEASES OF THE RESPIRATORY SYSTEM: ICD-10-CM

## 2024-08-07 DIAGNOSIS — Z96.649 PRESENCE OF UNSPECIFIED ARTIFICIAL HIP JOINT: ICD-10-CM

## 2024-08-07 DIAGNOSIS — Z82.49 FAMILY HISTORY OF ISCHEMIC HEART DISEASE AND OTHER DISEASES OF THE CIRCULATORY SYSTEM: ICD-10-CM

## 2024-08-07 DIAGNOSIS — Z98.890 OTHER SPECIFIED POSTPROCEDURAL STATES: ICD-10-CM
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
## 2024-08-07 DIAGNOSIS — Z83.511 FAMILY HISTORY OF GLAUCOMA: ICD-10-CM

## 2024-08-07 DIAGNOSIS — Z80.8 FAMILY HISTORY OF MALIGNANT NEOPLASM OF OTHER ORGANS OR SYSTEMS: ICD-10-CM

## 2024-08-07 DIAGNOSIS — T81.89XD OTHER COMPLICATIONS OF PROCEDURES, NOT ELSEWHERE CLASSIFIED, SUBSEQUENT ENCOUNTER: ICD-10-CM

## 2024-08-07 DIAGNOSIS — Z83.3 FAMILY HISTORY OF DIABETES MELLITUS: ICD-10-CM

## 2024-08-07 DIAGNOSIS — Z80.41 FAMILY HISTORY OF MALIGNANT NEOPLASM OF OVARY: ICD-10-CM

## 2024-08-07 DIAGNOSIS — Z86.16 PERSONAL HISTORY OF COVID-19: ICD-10-CM

## 2024-08-07 NOTE — PHYSICAL EXAM
[4 x 4] : 4 x 4  [2+] : left 2+ [Ankle Swelling (On Exam)] : not present [] : not present [Varicose Veins Of Lower Extremities] : not present [de-identified] : A&Ox3, NAD [de-identified] : 5 out of 5 strength in all quadrants bilaterally, ankle joint and subtalar joint range of motion intact [de-identified] : left ankle soft tissue mass on the lateral aspect.  Right ankle incision with intact sutures, no dehiscence, no purulence, no fluctuance, no proximal streaking, ecchymosis noted to the surgical site. [de-identified] : Light touch sensation intact bilaterally [FreeTextEntry1] : Right Foot, S/P Soft Tissue Mass Excision [FreeTextEntry2] : 6.0 [FreeTextEntry3] : 0.2 [FreeTextEntry4] : 0.1 suture line [de-identified] : small sanguineous [de-identified] : none [de-identified] : surgical [de-identified] : mild erythema with blistering [de-identified] : none [de-identified] : none [de-identified] : none [de-identified] : unable to visualize depth [de-identified] : Adaptic touch [de-identified] : Mechanically cleansed with sterile gauze and normal saline 0.9% Dry Dressing [de-identified] : Weekly [de-identified] : Primary Dressing

## 2024-08-07 NOTE — ASSESSMENT
[Verbal] : Verbal [Demo] : Demo [Patient] : Patient [Good - alert, interested, motivated] : Good - alert, interested, motivated [Verbalizes knowledge/Understanding] : Verbalizes knowledge/understanding [Dressing changes] : dressing changes [Skin Care] : skin care [Signs and symptoms of infection] : sign and symptoms of infection [Nutrition] : nutrition [How and When to Call] : how and when to call [Off-loading] : off-loading [Patient responsibility to plan of care] : patient responsibility to plan of care [] : Yes [Stable] : stable [Home] : Home [Other: ____] : [unfilled] [Not Applicable - Long Term Care/Home Health Agency] : Long Term Care/Home Health Agency: Not Applicable [FreeTextEntry2] : Infection Prevention Foot and nail care Nutrition and wound healing Pt Demonstrates use of both nonpharmacological and pharmacological pain relief strategies. [FreeTextEntry4] : S/P Soft Tissue Mass Excision  Patient has no complaints of pain, is eager to return to normal activity.  F/U 1 Week

## 2024-08-07 NOTE — HISTORY OF PRESENT ILLNESS
[FreeTextEntry1] : Patient presents to [facility] with lipoma of bilateral ankles. Right ankle pain secondary to the lipoma, the pain is about a 7/10. The pain is relieved by topical over the counter anesthetic. The lipoma started about a year ago, the pain started hurting about 4-5 months ago. Patient went to podiatrist, referred here for possible surgical intervention after sonongram was performed and lipoma was diagnosed.  8/2/24 patient seen status post right ankle excision of a lipoma (DOS: 7/30/2024).  Patient relates that she has been doing well and that her pain is controlled this time.  Denies any other complaints.  Denies any fever, chills, nausea, vomiting, chest pain, shortness of breath.

## 2024-08-07 NOTE — PHYSICAL EXAM
[4 x 4] : 4 x 4  [2+] : left 2+ [Ankle Swelling (On Exam)] : not present [] : not present [Varicose Veins Of Lower Extremities] : not present [de-identified] : A&Ox3, NAD [de-identified] : 5 out of 5 strength in all quadrants bilaterally, ankle joint and subtalar joint range of motion intact [de-identified] : left ankle soft tissue mass on the lateral aspect.  Right ankle incision with intact sutures, no dehiscence, no purulence, no fluctuance, no proximal streaking, ecchymosis noted to the surgical site. [de-identified] : Light touch sensation intact bilaterally [FreeTextEntry1] : Right Foot, S/P Soft Tissue Mass Excision [FreeTextEntry2] : 6.0 [FreeTextEntry3] : 0.2 [FreeTextEntry4] : 0.1 suture line [de-identified] : small sanguineous [de-identified] : none [de-identified] : surgical [de-identified] : mild erythema with blistering [de-identified] : none [de-identified] : none [de-identified] : none [de-identified] : unable to visualize depth [de-identified] : Adaptic touch [de-identified] : Mechanically cleansed with sterile gauze and normal saline 0.9% Dry Dressing [de-identified] : Weekly [de-identified] : Primary Dressing

## 2024-08-07 NOTE — HISTORY OF PRESENT ILLNESS
[FreeTextEntry1] : Patient presents to [facility] with lipoma of bilateral ankles. Right ankle pain secondary to the lipoma, the pain is about a 7/10. The pain is relieved by topical over the counter anesthetic. The lipoma started about a year ago, the pain started hurting about 4-5 months ago. Patient went to podiatrist, referred here for possible surgical intervention after sonongram was performed and lipoma was diagnosed.  8/2/24 patient seen status post right ankle excision of a lipoma (DOS: 7/30/2024).  Patient relates that she has been doing well and that her pain is controlled this time.  Denies any other complaints.  Denies any fever, chills, nausea, vomiting, chest pain, shortness of breath.

## 2024-08-07 NOTE — PLAN
[FreeTextEntry1] : Patient examined and evaluated at this time.  Patient advised regarding the postoperative healing process.  All questions answered satisfaction patient verbalized understanding.  Continue local wound care and offloading.  Patient to return in 1 week for possible suture removal.  Spent 20 minutes in patient care and medical decision making.

## 2024-08-08 ENCOUNTER — OUTPATIENT (OUTPATIENT)
Dept: OUTPATIENT SERVICES | Facility: HOSPITAL | Age: 59
LOS: 1 days | Discharge: HOME | End: 2024-08-08
Payer: COMMERCIAL

## 2024-08-08 ENCOUNTER — APPOINTMENT (OUTPATIENT)
Dept: WOUND CARE | Facility: HOSPITAL | Age: 59
End: 2024-08-08

## 2024-08-08 DIAGNOSIS — Z98.891 HISTORY OF UTERINE SCAR FROM PREVIOUS SURGERY: Chronic | ICD-10-CM

## 2024-08-08 DIAGNOSIS — Z98.890 OTHER SPECIFIED POSTPROCEDURAL STATES: Chronic | ICD-10-CM

## 2024-08-08 DIAGNOSIS — Z96.642 PRESENCE OF LEFT ARTIFICIAL HIP JOINT: Chronic | ICD-10-CM

## 2024-08-08 DIAGNOSIS — Z09 ENCOUNTER FOR FOLLOW-UP EXAMINATION AFTER COMPLETED TREATMENT FOR CONDITIONS OTHER THAN MALIGNANT NEOPLASM: ICD-10-CM

## 2024-08-08 PROCEDURE — G0463: CPT

## 2024-08-08 PROCEDURE — 99024 POSTOP FOLLOW-UP VISIT: CPT

## 2024-08-09 ENCOUNTER — APPOINTMENT (OUTPATIENT)
Dept: WOUND CARE | Facility: HOSPITAL | Age: 59
End: 2024-08-09

## 2024-08-09 ENCOUNTER — OUTPATIENT (OUTPATIENT)
Dept: OUTPATIENT SERVICES | Facility: HOSPITAL | Age: 59
LOS: 1 days | Discharge: HOME | End: 2024-08-09
Payer: COMMERCIAL

## 2024-08-09 DIAGNOSIS — E11.621 TYPE 2 DIABETES MELLITUS WITH FOOT ULCER: ICD-10-CM

## 2024-08-09 DIAGNOSIS — Z09 ENCOUNTER FOR FOLLOW-UP EXAMINATION AFTER COMPLETED TREATMENT FOR CONDITIONS OTHER THAN MALIGNANT NEOPLASM: ICD-10-CM

## 2024-08-09 DIAGNOSIS — Z98.890 OTHER SPECIFIED POSTPROCEDURAL STATES: Chronic | ICD-10-CM

## 2024-08-09 PROBLEM — T81.89XD DISCHARGE FROM SUTURE LINE, SUBSEQUENT ENCOUNTER: Status: ACTIVE | Noted: 2024-08-07

## 2024-08-09 PROCEDURE — G0463: CPT

## 2024-08-09 PROCEDURE — ZZZZZ: CPT

## 2024-08-09 NOTE — HISTORY OF PRESENT ILLNESS
[FreeTextEntry1] : Patient presents to [facility] with lipoma of bilateral ankles. Right ankle pain secondary to the lipoma, the pain is about a 7/10. The pain is relieved by topical over the counter anesthetic. The lipoma started about a year ago, the pain started hurting about 4-5 months ago. Patient went to podiatrist, referred here for possible surgical intervention after sonongram was performed and lipoma was diagnosed.  8/8/24 patient seen status post right ankle excision of a lipoma (DOS: 7/30/2024).  Patient relates that she has been doing well and that her pain is controlled this time.  Denies any fever, chills, nausea, vomiting, chest pain, shortness of breath.

## 2024-08-09 NOTE — PLAN
[FreeTextEntry1] : Patient examined and evaluated at this time.  Patient advised regarding the postoperative healing process.  All questions answered satisfaction patient verbalized understanding.  Continue local wound care and offloading.  All sutures removed at this time.  Patient to follow-up in 1 day.  Spent 20 minutes in patient care and medical decision making.

## 2024-08-09 NOTE — ASSESSMENT
[Verbal] : Verbal [Demo] : Demo [Patient] : Patient [Good - alert, interested, motivated] : Good - alert, interested, motivated [Verbalizes knowledge/Understanding] : Verbalizes knowledge/understanding [Dressing changes] : dressing changes [Skin Care] : skin care [Signs and symptoms of infection] : sign and symptoms of infection [Nutrition] : nutrition [How and When to Call] : how and when to call [Pain Management] : pain management [Off-loading] : off-loading [Patient responsibility to plan of care] : patient responsibility to plan of care [Stable] : stable [Home] : Home [Not Applicable - Long Term Care/Home Health Agency] : Long Term Care/Home Health Agency: Not Applicable [] : No [FreeTextEntry2] : Infection Prevention Foot and nail care Nutrition and wound healing Pt Demonstrates use of both nonpharmacological and pharmacological pain relief strategies. edema control [FreeTextEntry4] : S/P Soft Tissue Mass Excision  Patient has no complaints of pain, is eager to return to normal activity.  F/U 1 Week, 8/9/24 for a dressing change to assess erythema and edema as per Dr. Brandon.

## 2024-08-09 NOTE — PHYSICAL EXAM
[4 x 4] : 4 x 4  [2+] : left 2+ [Ankle Swelling (On Exam)] : not present [Varicose Veins Of Lower Extremities] : not present [] : not present [de-identified] : A&Ox3, NAD [de-identified] : 5 out of 5 strength in all quadrants bilaterally, ankle joint and subtalar joint range of motion intact [de-identified] : Status post left ankle soft tissue mass excision.  Right ankle incision with intact sutures, no dehiscence, no purulence, no fluctuance, no proximal streaking, ecchymosis noted to the surgical site.  Hematogenous blister noted. [de-identified] : Light touch sensation intact bilaterally [FreeTextEntry1] : Right Foot, S/P Soft Tissue Mass Excision- sutures removed by DPM [FreeTextEntry2] : 6.0 [FreeTextEntry3] : 0.2 [FreeTextEntry4] : 0.1 suture line [de-identified] : small sanguineous [de-identified] : none [de-identified] : surgical [de-identified] : none [de-identified] : mild erythema/edema with blistering [de-identified] : none [de-identified] : none [de-identified] : none [de-identified] : applied to hold dressing in place [de-identified] : Adaptic touch and silver alginate [de-identified] : Mechanically cleansed with sterile gauze and normal saline 0.9% Dry Dressing [de-identified] : Ace wraps [de-identified] : Weekly [de-identified] : Primary Dressing

## 2024-08-10 DIAGNOSIS — Z96.649 PRESENCE OF UNSPECIFIED ARTIFICIAL HIP JOINT: ICD-10-CM

## 2024-08-10 DIAGNOSIS — Z82.49 FAMILY HISTORY OF ISCHEMIC HEART DISEASE AND OTHER DISEASES OF THE CIRCULATORY SYSTEM: ICD-10-CM
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
## 2024-08-10 DIAGNOSIS — Z83.3 FAMILY HISTORY OF DIABETES MELLITUS: ICD-10-CM

## 2024-08-10 DIAGNOSIS — T81.89XD OTHER COMPLICATIONS OF PROCEDURES, NOT ELSEWHERE CLASSIFIED, SUBSEQUENT ENCOUNTER: ICD-10-CM
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
## 2024-08-10 DIAGNOSIS — Z80.41 FAMILY HISTORY OF MALIGNANT NEOPLASM OF OVARY: ICD-10-CM

## 2024-08-10 DIAGNOSIS — Z86.16 PERSONAL HISTORY OF COVID-19: ICD-10-CM

## 2024-08-10 DIAGNOSIS — Z83.511 FAMILY HISTORY OF GLAUCOMA: ICD-10-CM

## 2024-08-10 DIAGNOSIS — G47.33 OBSTRUCTIVE SLEEP APNEA (ADULT) (PEDIATRIC): ICD-10-CM

## 2024-08-10 DIAGNOSIS — Z80.8 FAMILY HISTORY OF MALIGNANT NEOPLASM OF OTHER ORGANS OR SYSTEMS: ICD-10-CM

## 2024-08-10 DIAGNOSIS — E03.9 HYPOTHYROIDISM, UNSPECIFIED: ICD-10-CM

## 2024-08-10 DIAGNOSIS — Z98.84 BARIATRIC SURGERY STATUS: ICD-10-CM

## 2024-08-10 DIAGNOSIS — I10 ESSENTIAL (PRIMARY) HYPERTENSION: ICD-10-CM

## 2024-08-10 DIAGNOSIS — I73.00 RAYNAUD'S SYNDROME WITHOUT GANGRENE: ICD-10-CM

## 2024-08-10 DIAGNOSIS — Z87.09 PERSONAL HISTORY OF OTHER DISEASES OF THE RESPIRATORY SYSTEM: ICD-10-CM

## 2024-08-10 DIAGNOSIS — Z98.890 OTHER SPECIFIED POSTPROCEDURAL STATES: ICD-10-CM

## 2024-08-12 ENCOUNTER — APPOINTMENT (OUTPATIENT)
Dept: WOUND CARE | Facility: HOSPITAL | Age: 59
End: 2024-08-12
Payer: COMMERCIAL

## 2024-08-12 VITALS
HEART RATE: 66 BPM | TEMPERATURE: 99.1 F | OXYGEN SATURATION: 95 % | RESPIRATION RATE: 16 BRPM | BODY MASS INDEX: 40.48 KG/M2 | WEIGHT: 220 LBS | HEIGHT: 62 IN | DIASTOLIC BLOOD PRESSURE: 73 MMHG | SYSTOLIC BLOOD PRESSURE: 119 MMHG

## 2024-08-12 PROCEDURE — ZZZZZ: CPT

## 2024-08-14 ENCOUNTER — APPOINTMENT (OUTPATIENT)
Dept: WOUND CARE | Facility: HOSPITAL | Age: 59
End: 2024-08-14

## 2024-08-14 VITALS
DIASTOLIC BLOOD PRESSURE: 76 MMHG | TEMPERATURE: 98.1 F | HEIGHT: 62 IN | SYSTOLIC BLOOD PRESSURE: 114 MMHG | HEART RATE: 71 BPM | RESPIRATION RATE: 18 BRPM | WEIGHT: 220 LBS | OXYGEN SATURATION: 98 % | BODY MASS INDEX: 40.48 KG/M2

## 2024-08-14 PROCEDURE — 99213 OFFICE O/P EST LOW 20 MIN: CPT

## 2024-08-15 ENCOUNTER — APPOINTMENT (OUTPATIENT)
Dept: PODIATRY | Facility: CLINIC | Age: 59
End: 2024-08-15

## 2024-08-16 ENCOUNTER — APPOINTMENT (OUTPATIENT)
Dept: WOUND CARE | Facility: HOSPITAL | Age: 59
End: 2024-08-16
Payer: COMMERCIAL

## 2024-08-16 VITALS
WEIGHT: 220 LBS | RESPIRATION RATE: 18 BRPM | HEART RATE: 71 BPM | HEIGHT: 62 IN | OXYGEN SATURATION: 96 % | TEMPERATURE: 99.1 F | BODY MASS INDEX: 40.48 KG/M2 | SYSTOLIC BLOOD PRESSURE: 106 MMHG | DIASTOLIC BLOOD PRESSURE: 73 MMHG

## 2024-08-16 PROCEDURE — 99024 POSTOP FOLLOW-UP VISIT: CPT

## 2024-08-18 NOTE — PHYSICAL EXAM
[4 x 4] : 4 x 4  [2+] : left 2+ [Ankle Swelling (On Exam)] : not present [Varicose Veins Of Lower Extremities] : not present [] : not present [de-identified] : 5 out of 5 strength in all quadrants bilaterally, ankle joint and subtalar joint range of motion intact [de-identified] : A&Ox3, NAD [de-identified] : Status post left ankle soft tissue mass excision.  Right ankle incision noted with healing, periwound noted with wound down to subcutaneous tissue s/p derrofment of the hematogenous blister, surgical site with  no dehiscence, no purulence, no fluctuance, no proximal streaking, ecchymosis noted to the surgical site.   [de-identified] : Light touch sensation intact bilaterally [FreeTextEntry1] : Right Foot, S/P Soft Tissue Mass Excision- sutures removed by DPM [FreeTextEntry2] : 5.5 [FreeTextEntry3] : 5.0 [FreeTextEntry4] : 0.1 [de-identified] : mod sanguineous [de-identified] : none [de-identified] : surgical [de-identified] : none [de-identified] : none [de-identified] : mild ecchymosis blistering [de-identified] : none [de-identified] : none [de-identified] : adaptic touch then silver alginate [de-identified] : from behind toes to below the knee [de-identified] : Mechanically cleansed with sterile gauze and normal saline 0.9% Dry Dressing  Blistered skin removed by Dr Carr [de-identified] : Ace wraps [de-identified] : 3x Weekly [de-identified] : Primary Dressing

## 2024-08-18 NOTE — ASSESSMENT
[Verbal] : Verbal [Demo] : Demo [Patient] : Patient [Good - alert, interested, motivated] : Good - alert, interested, motivated [Verbalizes knowledge/Understanding] : Verbalizes knowledge/understanding [Dressing changes] : dressing changes [Skin Care] : skin care [Signs and symptoms of infection] : sign and symptoms of infection [Nutrition] : nutrition [How and When to Call] : how and when to call [Pain Management] : pain management [Off-loading] : off-loading [Patient responsibility to plan of care] : patient responsibility to plan of care [Stable] : stable [Home] : Home [Not Applicable - Long Term Care/Home Health Agency] : Long Term Care/Home Health Agency: Not Applicable [] : No [FreeTextEntry4] : S/P Soft Tissue Mass Excision  Patient seen by Dr Abernathy and blistered skin removed. Tx remains the same. Patient accepted ace bandage from behind toes to below the knee Patient returning Friday Note written by  for no return to work for at least another 3 weeks,  [FreeTextEntry2] : Infection Prevention Foot and nail care Nutrition and wound healing Pt Demonstrates use of both nonpharmacological and pharmacological pain relief strategies. edema control

## 2024-08-18 NOTE — HISTORY OF PRESENT ILLNESS
[FreeTextEntry1] : Patient presents to [facility] with lipoma of bilateral ankles. Right ankle pain secondary to the lipoma, the pain is about a 7/10. The pain is relieved by topical over the counter anesthetic. The lipoma started about a year ago, the pain started hurting about 4-5 months ago. Patient went to podiatrist, referred here for possible surgical intervention after sonongram was performed and lipoma was diagnosed.  8/14/24 patient seen status post right ankle excision of a lipoma (DOS: 7/30/2024).  Patient relates that she has been doing well and that her pain is controlled this time. Patient states blistering noted to the loki incision site  Denies any fever, chills, nausea, vomiting, chest pain, shortness of breath.

## 2024-08-18 NOTE — HISTORY OF PRESENT ILLNESS
[FreeTextEntry1] : Patient presents to [facility] with lipoma of bilateral ankles. Right ankle pain secondary to the lipoma, the pain is about a 7/10. The pain is relieved by topical over the counter anesthetic. The lipoma started about a year ago, the pain started hurting about 4-5 months ago. Patient went to podiatrist, referred here for possible surgical intervention after sonongram was performed and lipoma was diagnosed.  8/14/24 patient seen status post right ankle excision of a lipoma (DOS: 7/30/2024).  Patient relates that she has been doing well and that her pain is controlled this time. Patient states blistering noted to the loki incision site  Denies any fever, chills, nausea, vomiting, chest pain, shortness of breath.

## 2024-08-18 NOTE — ASSESSMENT
[Verbal] : Verbal [Demo] : Demo [Patient] : Patient [Good - alert, interested, motivated] : Good - alert, interested, motivated [Verbalizes knowledge/Understanding] : Verbalizes knowledge/understanding [Dressing changes] : dressing changes [Skin Care] : skin care [Signs and symptoms of infection] : sign and symptoms of infection [Nutrition] : nutrition [How and When to Call] : how and when to call [Pain Management] : pain management [Off-loading] : off-loading [Patient responsibility to plan of care] : patient responsibility to plan of care [Stable] : stable [Home] : Home [Not Applicable - Long Term Care/Home Health Agency] : Long Term Care/Home Health Agency: Not Applicable [] : No [FreeTextEntry2] : Infection Prevention Foot and nail care Nutrition and wound healing Pt Demonstrates use of both nonpharmacological and pharmacological pain relief strategies. edema control [FreeTextEntry4] : S/P Soft Tissue Mass Excision  Patient seen by Dr Abernathy and blistered skin removed. Tx remains the same. Patient accepted ace bandage from behind toes to below the knee Patient returning Friday Note written by  for no return to work for at least another 3 weeks,

## 2024-08-18 NOTE — VITALS
[Pain related to present condition?] : The patient's  pain is related to present condition. [Tender] : tender [] : No [de-identified] : 3/10 at rest [FreeTextEntry3] : Right lateral ankle [FreeTextEntry1] : Tramadol and rest [FreeTextEntry2] : using scooter

## 2024-08-18 NOTE — VITALS
[Pain related to present condition?] : The patient's  pain is related to present condition. [Tender] : tender [] : No [de-identified] : 3/10 at rest [FreeTextEntry3] : Right lateral ankle [FreeTextEntry2] : using scooter  [FreeTextEntry1] : Tramadol and rest

## 2024-08-18 NOTE — VITALS
[Pain related to present condition?] : The patient's  pain is related to present condition. [Tender] : tender [] : No [de-identified] : 3/10 at rest [FreeTextEntry3] : Right lateral ankle [FreeTextEntry1] : Tramadol and rest [FreeTextEntry2] : using scooter

## 2024-08-18 NOTE — PHYSICAL EXAM
[4 x 4] : 4 x 4  [2+] : left 2+ [Ankle Swelling (On Exam)] : not present [Varicose Veins Of Lower Extremities] : not present [] : not present [de-identified] : A&Ox3, NAD [de-identified] : 5 out of 5 strength in all quadrants bilaterally, ankle joint and subtalar joint range of motion intact [de-identified] : Status post left ankle soft tissue mass excision.  Right ankle incision noted with healing, periwound noted with wound down to subcutaneous tissue s/p derrofment of the hematogenous blister, surgical site with  no dehiscence, no purulence, no fluctuance, no proximal streaking, ecchymosis noted to the surgical site.   [de-identified] : Light touch sensation intact bilaterally [FreeTextEntry1] : Right Foot, S/P Soft Tissue Mass Excision- sutures removed by DPM [FreeTextEntry2] : 5.5 [FreeTextEntry3] : 5.0 [de-identified] : mod sanguineous [FreeTextEntry4] : 0.1 [de-identified] : none [de-identified] : surgical [de-identified] : none [de-identified] : none [de-identified] : mild ecchymosis blistering [de-identified] : none [de-identified] : none [de-identified] : from behind toes to below the knee [de-identified] : adaptic touch then silver alginate [de-identified] : Mechanically cleansed with sterile gauze and normal saline 0.9% Dry Dressing  Blistered skin removed by Dr Carr [de-identified] : Ace wraps [de-identified] : 3x Weekly [de-identified] : Primary Dressing

## 2024-08-18 NOTE — HISTORY OF PRESENT ILLNESS
[FreeTextEntry1] : Patient presents to [facility] with lipoma of bilateral ankles. Right ankle pain secondary to the lipoma, the pain is about a 7/10. The pain is relieved by topical over the counter anesthetic. The lipoma started about a year ago, the pain started hurting about 4-5 months ago. Patient went to podiatrist, referred here for possible surgical intervention after sonongram was performed and lipoma was diagnosed.  8/14/24 patient seen status post right ankle excision of a lipoma (DOS: 7/30/2024).  Patient relates that she has been doing well and that her pain is controlled this time. Patient states blistering noted to the loki incision site  Denies any fever, chills, nausea, vomiting, chest pain, shortness of breath.

## 2024-08-18 NOTE — PLAN
[FreeTextEntry1] : Patient examined and evaluated at this time.  Patient advised regarding the postoperative healing process.  All questions answered satisfaction patient verbalized understanding.  Continue local wound care and offloading.  Patient to f/u in 2 days, sine patient's wound requires close monitoring.   Spent 20 minutes in patient care and medical decision making.

## 2024-08-18 NOTE — PHYSICAL EXAM
[4 x 4] : 4 x 4  [2+] : left 2+ [Ankle Swelling (On Exam)] : not present [Varicose Veins Of Lower Extremities] : not present [] : not present [de-identified] : A&Ox3, NAD [de-identified] : 5 out of 5 strength in all quadrants bilaterally, ankle joint and subtalar joint range of motion intact [de-identified] : Status post left ankle soft tissue mass excision.  Right ankle incision noted with healing, periwound noted with wound down to subcutaneous tissue s/p derrofment of the hematogenous blister, surgical site with  no dehiscence, no purulence, no fluctuance, no proximal streaking, ecchymosis noted to the surgical site.   [de-identified] : Light touch sensation intact bilaterally [FreeTextEntry1] : Right Foot, S/P Soft Tissue Mass Excision- sutures removed by DPM [FreeTextEntry2] : 5.5 [FreeTextEntry3] : 5.0 [de-identified] : mod sanguineous [FreeTextEntry4] : 0.1 [de-identified] : none [de-identified] : surgical [de-identified] : none [de-identified] : none [de-identified] : mild ecchymosis blistering [de-identified] : none [de-identified] : none [de-identified] : from behind toes to below the knee [de-identified] : adaptic touch then silver alginate [de-identified] : Mechanically cleansed with sterile gauze and normal saline 0.9% Dry Dressing  Blistered skin removed by Dr Carr [de-identified] : Ace wraps [de-identified] : 3x Weekly [de-identified] : Primary Dressing

## 2024-08-19 ENCOUNTER — INPATIENT (INPATIENT)
Facility: HOSPITAL | Age: 59
LOS: 3 days | Discharge: HOME | DRG: 605 | End: 2024-08-23
Attending: FAMILY MEDICINE | Admitting: FAMILY MEDICINE
Payer: COMMERCIAL

## 2024-08-19 ENCOUNTER — OUTPATIENT (OUTPATIENT)
Dept: OUTPATIENT SERVICES | Facility: HOSPITAL | Age: 59
LOS: 1 days | Discharge: ANOTHER HOSPITAL | End: 2024-08-19
Payer: COMMERCIAL

## 2024-08-19 ENCOUNTER — APPOINTMENT (OUTPATIENT)
Dept: WOUND CARE | Facility: HOSPITAL | Age: 59
End: 2024-08-19
Payer: COMMERCIAL

## 2024-08-19 VITALS
DIASTOLIC BLOOD PRESSURE: 84 MMHG | RESPIRATION RATE: 18 BRPM | OXYGEN SATURATION: 98 % | SYSTOLIC BLOOD PRESSURE: 126 MMHG | HEART RATE: 72 BPM | TEMPERATURE: 99 F | HEIGHT: 63 IN | WEIGHT: 220.02 LBS

## 2024-08-19 VITALS
SYSTOLIC BLOOD PRESSURE: 114 MMHG | OXYGEN SATURATION: 95 % | TEMPERATURE: 98.2 F | RESPIRATION RATE: 18 BRPM | HEIGHT: 62 IN | DIASTOLIC BLOOD PRESSURE: 73 MMHG | BODY MASS INDEX: 40.48 KG/M2 | HEART RATE: 82 BPM | WEIGHT: 220 LBS

## 2024-08-19 DIAGNOSIS — S91.001A UNSPECIFIED OPEN WOUND, RIGHT ANKLE, INITIAL ENCOUNTER: ICD-10-CM

## 2024-08-19 DIAGNOSIS — Z98.890 OTHER SPECIFIED POSTPROCEDURAL STATES: Chronic | ICD-10-CM

## 2024-08-19 DIAGNOSIS — Z41.9 ENCOUNTER FOR PROCEDURE FOR PURPOSES OTHER THAN REMEDYING HEALTH STATE, UNSPECIFIED: Chronic | ICD-10-CM

## 2024-08-19 DIAGNOSIS — Z98.891 HISTORY OF UTERINE SCAR FROM PREVIOUS SURGERY: Chronic | ICD-10-CM

## 2024-08-19 DIAGNOSIS — Z96.642 PRESENCE OF LEFT ARTIFICIAL HIP JOINT: Chronic | ICD-10-CM

## 2024-08-19 DIAGNOSIS — Z90.3 ACQUIRED ABSENCE OF STOMACH [PART OF]: Chronic | ICD-10-CM

## 2024-08-19 DIAGNOSIS — Z09 ENCOUNTER FOR FOLLOW-UP EXAMINATION AFTER COMPLETED TREATMENT FOR CONDITIONS OTHER THAN MALIGNANT NEOPLASM: ICD-10-CM

## 2024-08-19 LAB
ALBUMIN SERPL ELPH-MCNC: 3.7 G/DL — SIGNIFICANT CHANGE UP (ref 3.3–5)
ALP SERPL-CCNC: 87 U/L — SIGNIFICANT CHANGE UP (ref 40–120)
ALT FLD-CCNC: 17 U/L — SIGNIFICANT CHANGE UP (ref 12–78)
ANION GAP SERPL CALC-SCNC: 5 MMOL/L — SIGNIFICANT CHANGE UP (ref 5–17)
APTT BLD: 29.2 SEC — SIGNIFICANT CHANGE UP (ref 24.5–35.6)
AST SERPL-CCNC: 19 U/L — SIGNIFICANT CHANGE UP (ref 15–37)
BASOPHILS # BLD AUTO: 0.06 K/UL — SIGNIFICANT CHANGE UP (ref 0–0.2)
BASOPHILS NFR BLD AUTO: 0.8 % — SIGNIFICANT CHANGE UP (ref 0–2)
BILIRUB SERPL-MCNC: 0.4 MG/DL — SIGNIFICANT CHANGE UP (ref 0.2–1.2)
BUN SERPL-MCNC: 22 MG/DL — SIGNIFICANT CHANGE UP (ref 7–23)
CALCIUM SERPL-MCNC: 9.6 MG/DL — SIGNIFICANT CHANGE UP (ref 8.5–10.1)
CHLORIDE SERPL-SCNC: 106 MMOL/L — SIGNIFICANT CHANGE UP (ref 96–108)
CO2 SERPL-SCNC: 29 MMOL/L — SIGNIFICANT CHANGE UP (ref 22–31)
CREAT SERPL-MCNC: 0.79 MG/DL — SIGNIFICANT CHANGE UP (ref 0.5–1.3)
EGFR: 87 ML/MIN/1.73M2 — SIGNIFICANT CHANGE UP
EOSINOPHIL # BLD AUTO: 0.23 K/UL — SIGNIFICANT CHANGE UP (ref 0–0.5)
EOSINOPHIL NFR BLD AUTO: 3.2 % — SIGNIFICANT CHANGE UP (ref 0–6)
ERYTHROCYTE [SEDIMENTATION RATE] IN BLOOD: 22 MM/HR — HIGH (ref 0–20)
FLUAV AG NPH QL: SIGNIFICANT CHANGE UP
FLUBV AG NPH QL: SIGNIFICANT CHANGE UP
GLUCOSE SERPL-MCNC: 98 MG/DL — SIGNIFICANT CHANGE UP (ref 70–99)
HCT VFR BLD CALC: 41.5 % — SIGNIFICANT CHANGE UP (ref 34.5–45)
HGB BLD-MCNC: 13.6 G/DL — SIGNIFICANT CHANGE UP (ref 11.5–15.5)
IMM GRANULOCYTES NFR BLD AUTO: 0.3 % — SIGNIFICANT CHANGE UP (ref 0–0.9)
INR BLD: 0.95 RATIO — SIGNIFICANT CHANGE UP (ref 0.85–1.18)
LACTATE SERPL-SCNC: 1 MMOL/L — SIGNIFICANT CHANGE UP (ref 0.7–2)
LYMPHOCYTES # BLD AUTO: 2.43 K/UL — SIGNIFICANT CHANGE UP (ref 1–3.3)
LYMPHOCYTES # BLD AUTO: 33.3 % — SIGNIFICANT CHANGE UP (ref 13–44)
MCHC RBC-ENTMCNC: 30.8 PG — SIGNIFICANT CHANGE UP (ref 27–34)
MCHC RBC-ENTMCNC: 32.8 GM/DL — SIGNIFICANT CHANGE UP (ref 32–36)
MCV RBC AUTO: 94.1 FL — SIGNIFICANT CHANGE UP (ref 80–100)
MONOCYTES # BLD AUTO: 0.54 K/UL — SIGNIFICANT CHANGE UP (ref 0–0.9)
MONOCYTES NFR BLD AUTO: 7.4 % — SIGNIFICANT CHANGE UP (ref 2–14)
NEUTROPHILS # BLD AUTO: 4.01 K/UL — SIGNIFICANT CHANGE UP (ref 1.8–7.4)
NEUTROPHILS NFR BLD AUTO: 55 % — SIGNIFICANT CHANGE UP (ref 43–77)
NRBC # BLD: 0 /100 WBCS — SIGNIFICANT CHANGE UP (ref 0–0)
PLATELET # BLD AUTO: 303 K/UL — SIGNIFICANT CHANGE UP (ref 150–400)
POTASSIUM SERPL-MCNC: 3.7 MMOL/L — SIGNIFICANT CHANGE UP (ref 3.5–5.3)
POTASSIUM SERPL-SCNC: 3.7 MMOL/L — SIGNIFICANT CHANGE UP (ref 3.5–5.3)
PROCALCITONIN SERPL-MCNC: 0.04 NG/ML — SIGNIFICANT CHANGE UP
PROCALCITONIN SERPL-MCNC: 0.05 NG/ML — SIGNIFICANT CHANGE UP
PROT SERPL-MCNC: 7.4 G/DL — SIGNIFICANT CHANGE UP (ref 6–8.3)
PROTHROM AB SERPL-ACNC: 10.8 SEC — SIGNIFICANT CHANGE UP (ref 9.5–13)
RBC # BLD: 4.41 M/UL — SIGNIFICANT CHANGE UP (ref 3.8–5.2)
RBC # FLD: 13.6 % — SIGNIFICANT CHANGE UP (ref 10.3–14.5)
RSV RNA NPH QL NAA+NON-PROBE: SIGNIFICANT CHANGE UP
SARS-COV-2 RNA SPEC QL NAA+PROBE: SIGNIFICANT CHANGE UP
SODIUM SERPL-SCNC: 140 MMOL/L — SIGNIFICANT CHANGE UP (ref 135–145)
WBC # BLD: 7.29 K/UL — SIGNIFICANT CHANGE UP (ref 3.8–10.5)
WBC # FLD AUTO: 7.29 K/UL — SIGNIFICANT CHANGE UP (ref 3.8–10.5)

## 2024-08-19 PROCEDURE — 99285 EMERGENCY DEPT VISIT HI MDM: CPT

## 2024-08-19 PROCEDURE — 73610 X-RAY EXAM OF ANKLE: CPT | Mod: 26,RT

## 2024-08-19 PROCEDURE — 11042 DBRDMT SUBQ TIS 1ST 20SQCM/<: CPT

## 2024-08-19 PROCEDURE — 93010 ELECTROCARDIOGRAM REPORT: CPT

## 2024-08-19 RX ORDER — TRAMADOL HYDROCHLORIDE 200 MG/1
25 TABLET, EXTENDED RELEASE ORAL EVERY 4 HOURS
Refills: 0 | Status: DISCONTINUED | OUTPATIENT
Start: 2024-08-19 | End: 2024-08-20

## 2024-08-19 RX ORDER — VANCOMYCIN/0.9 % SOD CHLORIDE 1.75G/25
1000 PLASTIC BAG, INJECTION (ML) INTRAVENOUS ONCE
Refills: 0 | Status: COMPLETED | OUTPATIENT
Start: 2024-08-19 | End: 2024-08-19

## 2024-08-19 RX ORDER — POTASSIUM CHLORIDE 10 MEQ
10 TABLET, EXT RELEASE, PARTICLES/CRYSTALS ORAL DAILY
Refills: 0 | Status: DISCONTINUED | OUTPATIENT
Start: 2024-08-19 | End: 2024-08-20

## 2024-08-19 RX ORDER — PIPERACILLIN SODIUM AND TAZOBACTAM SODIUM 3; .375 G/15ML; G/15ML
3.38 INJECTION, POWDER, FOR SOLUTION INTRAVENOUS ONCE
Refills: 0 | Status: COMPLETED | OUTPATIENT
Start: 2024-08-19 | End: 2024-08-19

## 2024-08-19 RX ORDER — CEFAZOLIN SODIUM 2 G/100ML
1000 INJECTION, SOLUTION INTRAVENOUS EVERY 8 HOURS
Refills: 0 | Status: DISCONTINUED | OUTPATIENT
Start: 2024-08-19 | End: 2024-08-20

## 2024-08-19 RX ORDER — LEVOTHYROXINE SODIUM 100 MCG
125 TABLET ORAL DAILY
Refills: 0 | Status: DISCONTINUED | OUTPATIENT
Start: 2024-08-19 | End: 2024-08-20

## 2024-08-19 RX ORDER — HYDROMORPHONE HYDROCHLORIDE 2 MG/1
0.2 TABLET ORAL ONCE
Refills: 0 | Status: DISCONTINUED | OUTPATIENT
Start: 2024-08-19 | End: 2024-08-19

## 2024-08-19 RX ORDER — HYDROMORPHONE HYDROCHLORIDE 2 MG/1
0.5 TABLET ORAL ONCE
Refills: 0 | Status: DISCONTINUED | OUTPATIENT
Start: 2024-08-19 | End: 2024-08-19

## 2024-08-19 RX ORDER — MAGNESIUM, ALUMINUM HYDROXIDE 200-225/5
30 SUSPENSION, ORAL (FINAL DOSE FORM) ORAL EVERY 4 HOURS
Refills: 0 | Status: DISCONTINUED | OUTPATIENT
Start: 2024-08-19 | End: 2024-08-20

## 2024-08-19 RX ORDER — ACETAMINOPHEN 325 MG/1
650 TABLET ORAL EVERY 6 HOURS
Refills: 0 | Status: DISCONTINUED | OUTPATIENT
Start: 2024-08-19 | End: 2024-08-20

## 2024-08-19 RX ORDER — HEPARIN SODIUM,BOVINE 1000/ML
5000 VIAL (ML) INJECTION EVERY 12 HOURS
Refills: 0 | Status: DISCONTINUED | OUTPATIENT
Start: 2024-08-19 | End: 2024-08-20

## 2024-08-19 RX ORDER — ONDANSETRON 2 MG/ML
4 INJECTION, SOLUTION INTRAMUSCULAR; INTRAVENOUS EVERY 8 HOURS
Refills: 0 | Status: DISCONTINUED | OUTPATIENT
Start: 2024-08-19 | End: 2024-08-20

## 2024-08-19 RX ORDER — SODIUM CHLORIDE 9 MG/ML
1600 INJECTION INTRAMUSCULAR; INTRAVENOUS; SUBCUTANEOUS ONCE
Refills: 0 | Status: COMPLETED | OUTPATIENT
Start: 2024-08-19 | End: 2024-08-19

## 2024-08-19 RX ORDER — TRAMADOL HYDROCHLORIDE 200 MG/1
50 TABLET, EXTENDED RELEASE ORAL
Refills: 0 | Status: DISCONTINUED | OUTPATIENT
Start: 2024-08-19 | End: 2024-08-20

## 2024-08-19 RX ADMIN — PIPERACILLIN SODIUM AND TAZOBACTAM SODIUM 200 GRAM(S): 3; .375 INJECTION, POWDER, FOR SOLUTION INTRAVENOUS at 17:25

## 2024-08-19 RX ADMIN — CEFAZOLIN SODIUM 100 MILLIGRAM(S): 2 INJECTION, SOLUTION INTRAVENOUS at 22:47

## 2024-08-19 RX ADMIN — Medication 1000 MILLIGRAM(S): at 18:57

## 2024-08-19 RX ADMIN — SODIUM CHLORIDE 1600 MILLILITER(S): 9 INJECTION INTRAMUSCULAR; INTRAVENOUS; SUBCUTANEOUS at 17:03

## 2024-08-19 RX ADMIN — HYDROMORPHONE HYDROCHLORIDE 0.5 MILLIGRAM(S): 2 TABLET ORAL at 17:25

## 2024-08-19 RX ADMIN — HYDROMORPHONE HYDROCHLORIDE 0.2 MILLIGRAM(S): 2 TABLET ORAL at 23:46

## 2024-08-19 RX ADMIN — HYDROMORPHONE HYDROCHLORIDE 0.5 MILLIGRAM(S): 2 TABLET ORAL at 17:57

## 2024-08-19 RX ADMIN — PIPERACILLIN SODIUM AND TAZOBACTAM SODIUM 3.38 GRAM(S): 3; .375 INJECTION, POWDER, FOR SOLUTION INTRAVENOUS at 17:57

## 2024-08-19 RX ADMIN — Medication 250 MILLIGRAM(S): at 17:25

## 2024-08-19 NOTE — PLAN
[FreeTextEntry1] : Patient examined and evaluated at this time.  Patient advised regarding the postoperative healing process.  All questions answered satisfaction patient verbalized understanding.  Continue local wound care and offloading. Patient to follow-up in 1 week.  Spent 20 minutes in patient care and medical decision making.

## 2024-08-19 NOTE — H&P ADULT - NSICDXPASTMEDICALHX_GEN_ALL_CORE_FT
PAST MEDICAL HISTORY:  2019 novel coronavirus disease (COVID-19)     Abnormal EKG     Ankle mass     Asthma     Bilateral ankle pain, unspecified chronicity     Cervical polyp     Finger pain     Hypothyroidism     Localized swelling, mass and lump, unspecified lower limb     OA (osteoarthritis)     ANISA (obstructive sleep apnea)     Pain in both feet     Polyp of corpus uteri     Postmenopausal bleeding     Prediabetes     Primary osteoarthritis of both knees     Primary osteoarthritis of first carpometacarpal joint of right hand     Raynauds syndrome     Tinea unguium     Trochanteric bursitis of left hip

## 2024-08-19 NOTE — ED PROVIDER NOTE - HIV OFFER
Pt was calling to see if her pap results were back and also find out when  wants her to start taking the ocp.   Opt out

## 2024-08-19 NOTE — HISTORY OF PRESENT ILLNESS
[FreeTextEntry1] : Patient presents to [facility] with lipoma of bilateral ankles. Right ankle pain secondary to the lipoma, the pain is about a 7/10. The pain is relieved by topical over the counter anesthetic. The lipoma started about a year ago, the pain started hurting about 4-5 months ago. Patient went to podiatrist, referred here for possible surgical intervention after sonongram was performed and lipoma was diagnosed.  8/16/24 patient seen status post right ankle excision of a lipoma (DOS: 7/30/2024), now with a wound on the lateral right ankle.

## 2024-08-19 NOTE — ED PROVIDER NOTE - OBJECTIVE STATEMENT
Patient is a 59-year-old female who presents to the emergency room with a right ankle wound.  Past medical history of prediabetes hypothyroidism osteoarthritis trochanteric bursitis asthma Arias's obstructive sleep apnea patient is status post right lateral ankle lipoma excision and subsequently formed a large hematoma at the surgical site.  She has been following with the wound care center but presented with worsening of the hematoma and superficial skin necrosis.  She was advised to come to the emergency room for further workup and admission.  Reports that the procedure was 3 weeks ago and initially after she was started on Keflex she had worsening symptoms and then was started on Augmentin which she completed yesterday.  Denies any fevers chills nausea vomiting chest pain or shortness of breath.  Does endorse significant pain at the site.

## 2024-08-19 NOTE — H&P ADULT - PROBLEM SELECTOR PLAN 5
Wound care consult  cefazolin   IVPB 1000 liv GRAM(s) IV Intermittent every 8 hours  oxyCODONE    IR 5 milliGRAM(s) Oral once PRN Mild Pain (1 - 3)

## 2024-08-19 NOTE — ASSESSMENT
[Dressing changes] : dressing changes [Foot Care] : foot care [Skin Care] : skin care [Venous Disease] : venous disease [Nutrition] : nutrition [Pain Management] : pain management [Other: ____] : [unfilled] [Verbal] : Verbal [Written] : Written [Demo] : Demo [Patient] : Patient [Family member] : Family member [Good - alert, interested, motivated] : Good - alert, interested, motivated [Verbalizes knowledge/Understanding] : Verbalizes knowledge/understanding [Pressure relief] : pressure relief [Signs and symptoms of infection] : sign and symptoms of infection [How and When to Call] : how and when to call [Off-loading] : off-loading [Compression Therapy] : compression therapy [Patient responsibility to plan of care] : patient responsibility to plan of care [] : Yes [Stable] : stable [Home] : Home [Cane] : Cane [Not Applicable - Long Term Care/Home Health Agency] : Long Term Care/Home Health Agency: Not Applicable [FreeTextEntry2] : Infection prevention S/S of infection Pain management Glycemic control Weight reduction Foot and nail care Restore skin integrity Low sodium diet Ambulation safety Regular f/u with primary medical team Compression compliance Hygiene   [FreeTextEntry3] : decreased ankle edema [FreeTextEntry4] : Note to proceed with Orthovisc procedure provided today per Dpm. Pt reports that she has concert tickets but has secured handicap seating. The pt was advised to elevate her legs often for edema reduction. the pt was provided with a small number of supplies for 1 dressing change. Supply order to be determined at the next assessment F/U 8/19/24 and 8/21/24 for dressing change and 8/23/24 for assessment

## 2024-08-19 NOTE — ED PROVIDER NOTE - CLINICAL SUMMARY MEDICAL DECISION MAKING FREE TEXT BOX
Patient is a 59-year-old female who presents to the emergency room with a right ankle wound.  Past medical history of prediabetes hypothyroidism osteoarthritis trochanteric bursitis asthma Arias's obstructive sleep apnea patient is status post right lateral ankle lipoma excision and subsequently formed a large hematoma at the surgical site.  She has been following with the wound care center but presented with worsening of the hematoma and superficial skin necrosis.  She was advised to come to the emergency room for further workup and admission.  Reports that the procedure was 3 weeks ago and initially after she was started on Keflex she had worsening symptoms and then was started on Augmentin which she completed yesterday.  Denies any fevers chills nausea vomiting chest pain or shortness of breath.  Does endorse significant pain at the site. Patient presenting to the emergency room for admission for necrotic right ankle wound.  Obtain screening preop begin antibiotics medicate for pain obtain x-ray and monitor.  Patient will require admission. Patient is a 59-year-old female who presents to the emergency room with a right ankle wound.  Past medical history of prediabetes hypothyroidism osteoarthritis trochanteric bursitis asthma Arias's obstructive sleep apnea patient is status post right lateral ankle lipoma excision and subsequently formed a large hematoma at the surgical site.  She has been following with the wound care center but presented with worsening of the hematoma and superficial skin necrosis.  She was advised to come to the emergency room for further workup and admission.  Reports that the procedure was 3 weeks ago and initially after she was started on Keflex she had worsening symptoms and then was started on Augmentin which she completed yesterday.  Denies any fevers chills nausea vomiting chest pain or shortness of breath.  Does endorse significant pain at the site. Patient presenting to the emergency room for admission for necrotic right ankle wound.  Obtain screening preop begin antibiotics medicate for pain obtain x-ray and monitor.  Patient will require admission. Results of labs reviewed patient with a white count of 7.29 sed rate of 22 coags and CMP noted.  Independent review of x-ray reveals a soft tissue defect at the site no gas in the tissue.  Will admit at this time for further workup and evaluation. Patient is a 59-year-old female who presents to the emergency room with a right ankle wound.  Past medical history of prediabetes hypothyroidism osteoarthritis trochanteric bursitis asthma Arias's obstructive sleep apnea patient is status post right lateral ankle lipoma excision and subsequently formed a large hematoma at the surgical site.  She has been following with the wound care center but presented with worsening of the hematoma and superficial skin necrosis.  She was advised to come to the emergency room for further workup and admission.  Reports that the procedure was 3 weeks ago and initially after she was started on Keflex she had worsening symptoms and then was started on Augmentin which she completed yesterday.  Denies any fevers chills nausea vomiting chest pain or shortness of breath.  Does endorse significant pain at the site. Patient presenting to the emergency room for admission for necrotic right ankle wound.  Obtain screening preop begin antibiotics medicate for pain obtain x-ray and monitor.  Patient will require admission. Results of labs reviewed patient with a white count of 7.29 sed rate of 22 coags and CMP noted.  Independent review of x-ray reveals a soft tissue defect at the site no gas in the tissue.  Will admit at this time for further workup and evaluation. Independent review of EKG reveals a sinus bradycardia with a first-degree AV block at 57 bpm.

## 2024-08-19 NOTE — H&P ADULT - PROBLEM SELECTOR PLAN 2
HYDROmorphone  Injectable 0.5 milliGRAM(s) IV Push every 6 hours  oxyCODONE    IR 5 milliGRAM(s) Oral once PRN Mild Pain (1 - 3)

## 2024-08-19 NOTE — PHYSICAL EXAM
[4 x 4] : 4 x 4  [2+] : left 2+ [Ankle Swelling (On Exam)] : not present [Varicose Veins Of Lower Extremities] : not present [] : not present [de-identified] : A&Ox3, NAD [de-identified] : 5 out of 5 strength in all quadrants bilaterally, ankle joint and subtalar joint range of motion intact [de-identified] : Status post left ankle soft tissue mass excision.  Right ankle incision with intact sutures, no dehiscence, no purulence, no fluctuance, no proximal streaking, ecchymosis noted to the surgical site.  Hematogenous blister noted. [de-identified] : Light touch sensation intact bilaterally [de-identified] : Pt expressed comfort post ace application. Testing technique for neuromuscular and circulatory status demonstrated to the pt. Circulatory and neuromuscular status WNL [FreeTextEntry1] : Right Foot, S/P Lipoma Excision- sutures removed by DPM [FreeTextEntry2] : 5.5 [FreeTextEntry3] : 5.0 [FreeTextEntry4] :  0.1 [de-identified] : mod sanguineous [de-identified] : none [de-identified] : surgical [de-identified] : none [de-identified] : mild ecchymosis  [de-identified] : none [de-identified] : none [de-identified] : none [de-identified] : toes to 2 finger lengths below knee [de-identified] : Betadine soaked adaptic touch  then silver alginate [de-identified] : Mechanically cleansed with sterile gauze and normal saline 0.9% kerlix  [de-identified] : Ace wraps [de-identified] : 3x Weekly [de-identified] : Primary Dressing

## 2024-08-19 NOTE — H&P ADULT - NSHPLABSRESULTS_GEN_ALL_CORE
13.6   7.29  )-----------( 303      ( 19 Aug 2024 17:00 )             41.5     19 Aug 2024 17:00    140    |  106    |  22     ----------------------------<  98     3.7     |  29     |  0.79     Ca    9.6        19 Aug 2024 17:00    TPro  7.4    /  Alb  3.7    /  TBili  0.4    /  DBili  x      /  AST  19     /  ALT  17     /  AlkPhos  87     19 Aug 2024 17:00    LIVER FUNCTIONS - ( 19 Aug 2024 17:00 )  Alb: 3.7 g/dL / Pro: 7.4 g/dL / ALK PHOS: 87 U/L / ALT: 17 U/L / AST: 19 U/L / GGT: x           PT/INR - ( 19 Aug 2024 17:00 )   PT: 10.8 sec;   INR: 0.95 ratio      PTT - ( 19 Aug 2024 17:00 )  PTT:29.2 sec  CAPILLARY BLOOD GLUCOSE    Urinalysis Basic - ( 19 Aug 2024 17:00 )    Color: x / Appearance: x / SG: x / pH: x  Gluc: 98 mg/dL / Ketone: x  / Bili: x / Urobili: x   Blood: x / Protein: x / Nitrite: x   Leuk Esterase: x / RBC: x / WBC x   Sq Epi: x / Non Sq Epi: x / Bacteria: x

## 2024-08-19 NOTE — HISTORY OF PRESENT ILLNESS
[FreeTextEntry1] : Patient presents to [facility] with lipoma of bilateral ankles. Right ankle pain secondary to the lipoma, the pain is about a 7/10. The pain is relieved by topical over the counter anesthetic. The lipoma started about a year ago, the pain started hurting about 4-5 months ago. Patient went to podiatrist, referred here for possible surgical intervention after sonongram was performed and lipoma was diagnosed.  8/16/24 patient seen status post right ankle excision of a lipoma (DOS: 7/30/2024), now with a wound on the lateral right ankle.

## 2024-08-19 NOTE — H&P ADULT - NSHPADDITIONALINFOADULT_GEN_ALL_CORE
Prophylactic Measures:   heparin   Injectable 5000 Unit(s) SubCutaneous every 12 hours  Prophylactic Measures:  lactated ringers. 1000 milliLiter(s) (75 mL/Hr) IV Continuous <Continuous>  potassium chloride    Tablet ER 10 milliEquivalent(s) Oral daily  aluminum hydroxide/magnesium hydroxide/simethicone Suspension 30 milliLiter(s) Oral every 4 hours PRN Dyspepsia  melatonin 3 milliGRAM(s) Oral at bedtime PRN Insomnia  ondansetron Injectable 4 milliGRAM(s) IV Push once PRN Nausea and/or Vomiting  ondansetron Injectable 4 milliGRAM(s) IV Push every 8 hours PRN Nausea and/or Vomiting

## 2024-08-19 NOTE — CONSULT NOTE ADULT - PROBLEM SELECTOR RECOMMENDATION 9
Chart reviewed and Patient evaluated.   Discussed diagnosis and treatment with patient.  She and her  demonstrated verbal understanding of the current treatment plan.  Rec hospital admission for surgical wound debridement with wound VAC application tomorrow, 08/20/2024.  Rec IV abx.  Request medical and cardiac optimization prior to surgery.  Applied dry sterile dressings.  Podiatry will follow while in house.  Will discuss care plan with all attending.

## 2024-08-19 NOTE — ED ADULT NURSE NOTE - NSFALLUNIVINTERV_ED_ALL_ED
Bed/Stretcher in lowest position, wheels locked, appropriate side rails in place/Call bell, personal items and telephone in reach/Instruct patient to call for assistance before getting out of bed/chair/stretcher/Non-slip footwear applied when patient is off stretcher/Serena to call system/Physically safe environment - no spills, clutter or unnecessary equipment/Purposeful proactive rounding/Room/bathroom lighting operational, light cord in reach

## 2024-08-19 NOTE — H&P ADULT - ASSESSMENT
FRANCOIS FIELD is a 59-year-old female who presents to the emergency room with a right ankle wound.  Past medical history of prediabetes, hypothyroidism, osteoarthritis, trochanteric bursitis, asthma, Raynaud's,  obstructive sleep apnea, status post right lateral ankle lipoma excision and subsequently formed a large hematoma at the surgical site.  She has been following with the wound care center but worsening of the hematoma and superficial skin necrosis.

## 2024-08-19 NOTE — H&P ADULT - HISTORY OF PRESENT ILLNESS
Chart, labs and reports reviewed.     Chart, labs and reports reviewed.  FRANCOIS FIELD is a 59-year-old female who presents to the emergency room with a right ankle wound.  Past medical history of prediabetes, hypothyroidism, osteoarthritis, trochanteric bursitis, asthma, Raynaud's,  obstructive sleep apnea, status post right lateral ankle lipoma excision and subsequently formed a large hematoma at the surgical site.  She has been following with the wound care center but worsening of the hematoma and superficial skin necrosis.  She was advised to come to the emergency room for further workup and admission.  Reports that the procedure was 3 weeks ago and initially after she was started on Keflex she had worsening symptoms and then was started on Augmentin which she completed yesterday.  Denies any fevers chills nausea vomiting chest pain or shortness of breath.  Does endorse significant pain at the site.

## 2024-08-19 NOTE — ED PROVIDER NOTE - DIFFERENTIAL DIAGNOSIS
Patient presenting to the emergency room for admission for necrotic right ankle wound.  Obtain screening preop begin antibiotics medicate for pain obtain x-ray and monitor.  Patient will require admission. Differential Diagnosis

## 2024-08-19 NOTE — VITALS
[Pain related to present condition?] : The patient's  pain is related to present condition. [Burning] : burning [] : No [FreeTextEntry3] : right lateral ankle [FreeTextEntry1] : tramadol, reduction of pressure, elevation [FreeTextEntry2] : walking , pressure [FreeTextEntry4] : leg elevation

## 2024-08-19 NOTE — ED PROVIDER NOTE - PHYSICAL EXAMINATION
Open wound to the right lateral ankle, with necrotic, dried hemorrhagic tissue noted. The wound extends down to the level of the deep fascia.

## 2024-08-19 NOTE — PHYSICAL EXAM
[4 x 4] : 4 x 4  [2+] : left 2+ [Ankle Swelling (On Exam)] : not present [Varicose Veins Of Lower Extremities] : not present [] : not present [de-identified] : A&Ox3, NAD [de-identified] : 5 out of 5 strength in all quadrants bilaterally, ankle joint and subtalar joint range of motion intact [de-identified] : Status post left ankle soft tissue mass excision.  Right ankle incision with intact sutures, no dehiscence, no purulence, no fluctuance, no proximal streaking, ecchymosis noted to the surgical site.  Hematogenous blister noted. [de-identified] : Light touch sensation intact bilaterally [de-identified] : Pt expressed comfort post ace application. Testing technique for neuromuscular and circulatory status demonstrated to the pt. Circulatory and neuromuscular status WNL [FreeTextEntry1] : Right Foot, S/P Lipoma Excision- sutures removed by DPM [FreeTextEntry2] : 5.5 [FreeTextEntry3] : 5.0 [FreeTextEntry4] :  0.1 [de-identified] : mod sanguineous [de-identified] : none [de-identified] : surgical [de-identified] : none [de-identified] : mild ecchymosis  [de-identified] : none [de-identified] : none [de-identified] : none [de-identified] : toes to 2 finger lengths below knee [de-identified] : Betadine soaked adaptic touch  then silver alginate [de-identified] : Mechanically cleansed with sterile gauze and normal saline 0.9% kerlix  [de-identified] : Ace wraps [de-identified] : 3x Weekly [de-identified] : Primary Dressing

## 2024-08-20 DIAGNOSIS — E03.9 HYPOTHYROIDISM, UNSPECIFIED: ICD-10-CM

## 2024-08-20 DIAGNOSIS — S91.001A UNSPECIFIED OPEN WOUND, RIGHT ANKLE, INITIAL ENCOUNTER: ICD-10-CM

## 2024-08-20 DIAGNOSIS — J45.909 UNSPECIFIED ASTHMA, UNCOMPLICATED: ICD-10-CM

## 2024-08-20 DIAGNOSIS — G47.33 OBSTRUCTIVE SLEEP APNEA (ADULT) (PEDIATRIC): ICD-10-CM

## 2024-08-20 DIAGNOSIS — M19.90 UNSPECIFIED OSTEOARTHRITIS, UNSPECIFIED SITE: ICD-10-CM

## 2024-08-20 LAB
BLD GP AB SCN SERPL QL: SIGNIFICANT CHANGE UP
CRP SERPL-MCNC: <3 MG/L — SIGNIFICANT CHANGE UP

## 2024-08-20 PROCEDURE — 11042 DBRDMT SUBQ TIS 1ST 20SQCM/<: CPT

## 2024-08-20 PROCEDURE — 99252 IP/OBS CONSLTJ NEW/EST SF 35: CPT

## 2024-08-20 PROCEDURE — 88304 TISSUE EXAM BY PATHOLOGIST: CPT | Mod: 26

## 2024-08-20 RX ORDER — ONDANSETRON 2 MG/ML
4 INJECTION, SOLUTION INTRAMUSCULAR; INTRAVENOUS ONCE
Refills: 0 | Status: DISCONTINUED | OUTPATIENT
Start: 2024-08-20 | End: 2024-08-22

## 2024-08-20 RX ORDER — OXYCODONE HYDROCHLORIDE 5 MG/1
5 TABLET ORAL ONCE
Refills: 0 | Status: DISCONTINUED | OUTPATIENT
Start: 2024-08-20 | End: 2024-08-22

## 2024-08-20 RX ORDER — FOLIC ACID/MULTIVIT,IRON,MINER 0.4MG-18MG
1 TABLET,CHEWABLE ORAL
Refills: 0 | DISCHARGE

## 2024-08-20 RX ORDER — HYDROMORPHONE HYDROCHLORIDE 2 MG/1
0.5 TABLET ORAL
Refills: 0 | Status: DISCONTINUED | OUTPATIENT
Start: 2024-08-20 | End: 2024-08-20

## 2024-08-20 RX ORDER — CEFAZOLIN SODIUM 2 G/100ML
1000 INJECTION, SOLUTION INTRAVENOUS EVERY 8 HOURS
Refills: 0 | Status: DISCONTINUED | OUTPATIENT
Start: 2024-08-20 | End: 2024-08-23

## 2024-08-20 RX ORDER — ASCORBIC ACID/ASCORBATE SODIUM 500 MG
1 TABLET,CHEWABLE ORAL
Refills: 0 | DISCHARGE

## 2024-08-20 RX ORDER — LEVOTHYROXINE SODIUM 100 MCG
125 TABLET ORAL DAILY
Refills: 0 | Status: DISCONTINUED | OUTPATIENT
Start: 2024-08-20 | End: 2024-08-23

## 2024-08-20 RX ORDER — CALCIUM CARBONATE/VITAMIN D3 500MG-5MCG
1 TABLET ORAL
Refills: 0 | DISCHARGE

## 2024-08-20 RX ORDER — HEPARIN SODIUM,BOVINE 1000/ML
5000 VIAL (ML) INJECTION EVERY 12 HOURS
Refills: 0 | Status: DISCONTINUED | OUTPATIENT
Start: 2024-08-21 | End: 2024-08-22

## 2024-08-20 RX ORDER — POTASSIUM CHLORIDE 10 MEQ
10 TABLET, EXT RELEASE, PARTICLES/CRYSTALS ORAL DAILY
Refills: 0 | Status: DISCONTINUED | OUTPATIENT
Start: 2024-08-20 | End: 2024-08-22

## 2024-08-20 RX ORDER — ONDANSETRON 2 MG/ML
4 INJECTION, SOLUTION INTRAMUSCULAR; INTRAVENOUS EVERY 8 HOURS
Refills: 0 | Status: DISCONTINUED | OUTPATIENT
Start: 2024-08-20 | End: 2024-08-23

## 2024-08-20 RX ORDER — HYDROMORPHONE HYDROCHLORIDE 2 MG/1
0.5 TABLET ORAL EVERY 6 HOURS
Refills: 0 | Status: DISCONTINUED | OUTPATIENT
Start: 2024-08-20 | End: 2024-08-21

## 2024-08-20 RX ORDER — MAGNESIUM, ALUMINUM HYDROXIDE 200-225/5
30 SUSPENSION, ORAL (FINAL DOSE FORM) ORAL EVERY 4 HOURS
Refills: 0 | Status: DISCONTINUED | OUTPATIENT
Start: 2024-08-20 | End: 2024-08-23

## 2024-08-20 RX ADMIN — Medication 10 MILLIEQUIVALENT(S): at 14:31

## 2024-08-20 RX ADMIN — TRAMADOL HYDROCHLORIDE 50 MILLIGRAM(S): 200 TABLET, EXTENDED RELEASE ORAL at 10:01

## 2024-08-20 RX ADMIN — CEFAZOLIN SODIUM 100 MILLIGRAM(S): 2 INJECTION, SOLUTION INTRAVENOUS at 14:31

## 2024-08-20 RX ADMIN — TRAMADOL HYDROCHLORIDE 50 MILLIGRAM(S): 200 TABLET, EXTENDED RELEASE ORAL at 11:00

## 2024-08-20 RX ADMIN — ACETAMINOPHEN 650 MILLIGRAM(S): 325 TABLET ORAL at 04:43

## 2024-08-20 RX ADMIN — CEFAZOLIN SODIUM 100 MILLIGRAM(S): 2 INJECTION, SOLUTION INTRAVENOUS at 22:40

## 2024-08-20 RX ADMIN — CEFAZOLIN SODIUM 100 MILLIGRAM(S): 2 INJECTION, SOLUTION INTRAVENOUS at 06:06

## 2024-08-20 NOTE — CONSULT NOTE ADULT - ASSESSMENT
59y F, pmh prediabetes, hypothyroidism, osteoarthritis, trochanteric bursitis, asthma, Raynaud's,  obstructive sleep apnea, admitted for right leg wound debridement, cardiology consutled for cardiac clearance.    # cardiac clearance  - No clear evidence of acute ischemia  - EKG: NSR, first degree HB  - No hx of CAD  - Monitor and replete lytes, keep K>4, Mg>2.  - BP stable currently  - Continue to monitor hemodynamics   - Mets>4  - RCRI: 0 points  - Patient is a low risk patient for a low risk procedure  - Patient is medically optimized for procedure, and is cleared for procedure.      #post operative  - Other cardiovascular workup will depend on clinical course.  - All other workup per primary team.  - Will continue to follow.      
Right ankle wound
59-year-old female with DM, who presents to the emergency room with a right ankle wound. Status post right lateral ankle lipoma excision on 7/30/24, and subsequently formed a large hematoma at the surgical site.  She has been following with the wound care center but worsening of the hematoma and superficial skin necrosis. She has been on a course of Keflex and Augmentin since the surgery.      Plan for debridement today and wound vac placement. Otherwise no fevers and no leukocytosis. CRP <3.    #R ankle wound    -continue cefazolin for now  -send OR cultures    Thank you for courtesy of this consult.     Will follow.  Discussed with the primary team.     Shannon Lugo MD  Division of Infectious Diseases   Cell [phone] between 8am and 6pm   After 6pm and weekends please call ID service at [phone].     55 minutes spent on total encounter assessing patient, examination, chart review, counseling and coordinating care by the attending physician/nurse/care manager.

## 2024-08-20 NOTE — CONSULT NOTE ADULT - SUBJECTIVE AND OBJECTIVE BOX
[facility]  INFECTIOUS DISEASES - Janice [address], [place] [address]  Tel: [phone]     Fax: [phone]  =======================================================    [id number]  FRANCOIS FIELD     CC: Patient is a 59y old  Female who presents with a chief complaint of Right ankle open wound    HPI:  59-year-old female who presents to the emergency room with a right ankle wound.  Past medical history of prediabetes, hypothyroidism, osteoarthritis, trochanteric bursitis, asthma, Raynaud's,  obstructive sleep apnea. Status post right lateral ankle lipoma excision on 24, and subsequently formed a large hematoma at the surgical site.  She has been following with the wound care center but worsening of the hematoma and superficial skin necrosis. She has been on a course of Keflex and Augmentin since the surgery.  Denies any fevers or chills. Has some pain on R ankle but denies any pain elsewhere.    PAST MEDICAL & SURGICAL HISTORY:  Prediabetes      Hypothyroidism      OA (osteoarthritis)      Polyp of corpus uteri      Postmenopausal bleeding      Localized swelling, mass and lump, unspecified lower limb      Abnormal EKG      Ankle mass      2019 novel coronavirus disease (COVID-19)      Bilateral ankle pain, unspecified chronicity      Finger pain      Pain in both feet      Primary osteoarthritis of both knees      Tinea unguium      Primary osteoarthritis of first carpometacarpal joint of right hand      Trochanteric bursitis of left hip      Asthma      Cervical polyp      Raynauds syndrome      ANISA (obstructive sleep apnea)      S/P  section  ()      Elective surgery  (Right big toe joint replacement, )      History of colonoscopy  (2018)      History of D&C      History of left hip replacement      H/O gastric sleeve          Social Hx:     FAMILY HISTORY:  Family history of ovarian cancer (Mother)    MI (myocardial infarction) (Father)        Allergies    No Known Allergies    Intolerances        Antibiotics:  MEDICATIONS  (STANDING):  ceFAZolin   IVPB 1000 milliGRAM(s) IV Intermittent every 8 hours  heparin   Injectable 5000 Unit(s) SubCutaneous every 12 hours  levothyroxine 125 MICROGram(s) Oral daily  potassium chloride    Tablet ER 10 milliEquivalent(s) Oral daily    MEDICATIONS  (PRN):  acetaminophen     Tablet .. 650 milliGRAM(s) Oral every 6 hours PRN Temp greater or equal to 38C (100.4F), Mild Pain (1 - 3)  aluminum hydroxide/magnesium hydroxide/simethicone Suspension 30 milliLiter(s) Oral every 4 hours PRN Dyspepsia  melatonin 3 milliGRAM(s) Oral at bedtime PRN Insomnia  ondansetron Injectable 4 milliGRAM(s) IV Push every 8 hours PRN Nausea and/or Vomiting  traMADol 25 milliGRAM(s) Oral every 4 hours PRN Moderate Pain (4 - 6)  traMADol 50 milliGRAM(s) Oral four times a day PRN Severe Pain (7 - 10)       REVIEW OF SYSTEMS:  CONSTITUTIONAL:  No Fever or chills  HEENT:  No sore throat or runny nose.  CARDIOVASCULAR:  No chest pain or SOB.  RESPIRATORY:  No cough, shortness of breath  GASTROINTESTINAL:  No nausea, vomiting or diarrhea.  GENITOURINARY:  No dysuria, frequency or urgency  MUSCULOSKELETAL:  (+) R ankle pain  SKIN:  see history  NEUROLOGIC:  No headache or dizziness  PSYCHIATRIC:  No disorder of thought or mood.    Physical Exam:  Vital Signs Last 24 Hrs  T(C): 36.6 (20 Aug 2024 07:00), Max: 37.2 (19 Aug 2024 23:51)  T(F): 97.9 (20 Aug 2024 07:00), Max: 98.9 (19 Aug 2024 23:51)  HR: 62 (20 Aug 2024 07:00) (60 - 72)  BP: 120/82 (20 Aug 2024 07:00) (114/74 - 126/84)  BP(mean): --  RR: 17 (20 Aug 2024 07:00) (17 - 18)  SpO2: 98% (20 Aug 2024 07:00) (97% - 98%)    Parameters below as of 20 Aug 2024 07:00  Patient On (Oxygen Delivery Method): room air      Height (cm): 160 ( @ 15:40)  Weight (kg): 99.8 ( @ 15:40)  BMI (kg/m2): 39 ( @ 15:40)  BSA (m2): 2.01 ( @ 15:40)  GEN: NAD  HEENT: normocephalic and atraumatic.   NECK: Supple.   LUNGS: Normal respiratory effort  HEART: Regular rate and rhythm   ABDOMEN: Soft, nontender, and nondistended.    EXTREMITIES: RLE swelling, no erythema  NEUROLOGIC: grossly intact.  PSYCHIATRIC: Appropriate affect .  SKIN: R ankle open wound    Labs:      140  |  106  |  22  ----------------------------<  98  3.7   |  29  |  0.79    Ca    9.6      19 Aug 2024 17:00    TPro  7.4  /  Alb  3.7  /  TBili  0.4  /  DBili  x   /  AST    /  ALT  17  /  AlkPhos  87                            13.6   7.29  )-----------( 303      ( 19 Aug 2024 17:00 )             41.5     PT/INR - ( 19 Aug 2024 17:00 )   PT: 10.8 sec;   INR: 0.95 ratio         PTT - ( 19 Aug 2024 17:00 )  PTT:29.2 sec  Urinalysis Basic - ( 19 Aug 2024 17:00 )    Color: x / Appearance: x / SG: x / pH: x  Gluc: 98 mg/dL / Ketone: x  / Bili: x / Urobili: x   Blood: x / Protein: x / Nitrite: x   Leuk Esterase: x / RBC: x / WBC x   Sq Epi: x / Non Sq Epi: x / Bacteria: x      LIVER FUNCTIONS - ( 19 Aug 2024 17:00 )  Alb: 3.7 g/dL / Pro: 7.4 g/dL / ALK PHOS: 87 U/L / ALT: 17 U/L / AST: 19 U/L / GGT: x                 Procalcitonin: 0.05 ng/mL (24 @ 21:19)  Procalcitonin: 0.04 ng/mL (24 @ 17:00)    C-Reactive Protein: <3 mg/L (24 @ 17:00)    Sedimentation Rate, Erythrocyte: 22 mm/hr (24 @ 17:00)    SARS-CoV-2 Result: NotDetec (24 @ 17:00)      RECENT CULTURES:        All imaging and other data have been reviewed.    
Patient is a 59y old  Female who presents with a chief complaint of Right ankle open wound (19 Aug 2024 22:22)      HPI:  Chart, labs and reports reviewed.  FRANCOIS FIELD is a 59-year-old female who presents to the emergency room with a right ankle wound.  Past medical history of prediabetes, hypothyroidism, osteoarthritis, trochanteric bursitis, asthma, Raynaud's,  obstructive sleep apnea, status post right lateral ankle lipoma excision and subsequently formed a large hematoma at the surgical site.  She has been following with the wound care center but worsening of the hematoma and superficial skin necrosis.  She was advised to come to the emergency room for further workup and admission.  Reports that the procedure was 3 weeks ago and initially after she was started on Keflex she had worsening symptoms and then was started on Augmentin which she completed yesterday.  Denies any fevers chills nausea vomiting chest pain or shortness of breath.  Does endorse significant pain at the site.   (19 Aug 2024 22:22)      PAST MEDICAL & SURGICAL HISTORY:  Prediabetes      Hypothyroidism      OA (osteoarthritis)      Polyp of corpus uteri      Postmenopausal bleeding      Localized swelling, mass and lump, unspecified lower limb      Abnormal EKG      Ankle mass      2019 novel coronavirus disease (COVID-19)      Bilateral ankle pain, unspecified chronicity      Finger pain      Pain in both feet      Primary osteoarthritis of both knees      Tinea unguium      Primary osteoarthritis of first carpometacarpal joint of right hand      Trochanteric bursitis of left hip      Asthma      Cervical polyp      Raynauds syndrome      ANISA (obstructive sleep apnea)      S/P  section  ()      Elective surgery  (Right big toe joint replacement, )      History of colonoscopy  (2018)      History of D&C      History of left hip replacement      H/O gastric sleeve                MEDICATIONS  (STANDING):  ceFAZolin   IVPB 1000 milliGRAM(s) IV Intermittent every 8 hours  heparin   Injectable 5000 Unit(s) SubCutaneous every 12 hours  levothyroxine 125 MICROGram(s) Oral daily  potassium chloride    Tablet ER 10 milliEquivalent(s) Oral daily    MEDICATIONS  (PRN):  acetaminophen     Tablet .. 650 milliGRAM(s) Oral every 6 hours PRN Temp greater or equal to 38C (100.4F), Mild Pain (1 - 3)  aluminum hydroxide/magnesium hydroxide/simethicone Suspension 30 milliLiter(s) Oral every 4 hours PRN Dyspepsia  melatonin 3 milliGRAM(s) Oral at bedtime PRN Insomnia  ondansetron Injectable 4 milliGRAM(s) IV Push every 8 hours PRN Nausea and/or Vomiting  traMADol 50 milliGRAM(s) Oral four times a day PRN Severe Pain (7 - 10)  traMADol 25 milliGRAM(s) Oral every 4 hours PRN Moderate Pain (4 - 6)      FAMILY HISTORY:  Family history of ovarian cancer (Mother)    MI (myocardial infarction) (Father)      Denies Family history of CAD or early MI      Constitutional: denies fever, chills  HEENT: denies blurry vision, difficulty hearing  Respiratory: denies SOB, PAREDES, cough  Cardiovascular: denies CP, palpitations, orthopnea, PND, LE edema  Gastrointestinal: denies nausea, vomiting, abdominal pain  Genitourinary: denies urinary changes  Skin: right ankle wound  Neurologic: denies headache, weakness, dizziness  MSK: denies leg swelling, varicose veins  Hematology/Oncology: denies bleeding, easy bruising  ROS negative except as noted above      SOCIAL HISTORY:    No tobacco, alcohol or recreational drug use    Vital Signs Last 24 Hrs  T(C): 36.6 (20 Aug 2024 07:00), Max: 37.2 (19 Aug 2024 23:51)  T(F): 97.9 (20 Aug 2024 07:00), Max: 98.9 (19 Aug 2024 23:51)  HR: 62 (20 Aug 2024 07:00) (60 - 72)  BP: 120/82 (20 Aug 2024 07:00) (114/74 - 126/84)  BP(mean): --  RR: 17 (20 Aug 2024 07:00) (17 - 18)  SpO2: 98% (20 Aug 2024 07:00) (97% - 98%)    Parameters below as of 20 Aug 2024 07:00  Patient On (Oxygen Delivery Method): room air        Physical Exam:  General: Well developed, well nourished, NAD  HEENT: moist mucous membranes   Neck: Supple, nontender, no mass  Neurology: A&Ox3, nonfocal, sensation intact   Respiratory: CTA B/L, No W/R/R  CV: RRR, +S1/S2, no murmurs, rubs or gallops  Abdominal: Soft, NT, ND, no palpable masses  Extremities: No C/C/E, + peripheral pulses  MSK: Normal ROM, no joint erythema or warmth, no joint swelling   Heme: No obvious ecchymosis or petechiae   Skin: warm, dry, normal color, right leg wound wrap        I&O's Detail      LABS:                        13.6   7.29  )-----------( 303      ( 19 Aug 2024 17:00 )             41.5     08-    140  |  106  |  22  ----------------------------<  98  3.7   |  29  |  0.79    Ca    9.6      19 Aug 2024 17:00    TPro  7.4  /  Alb  3.7  /  TBili  0.4  /  DBili  x   /  AST    /  ALT    /  AlkPhos  87          PT/INR - ( 19 Aug 2024 17:00 )   PT: 10.8 sec;   INR: 0.95 ratio         PTT - ( 19 Aug 2024 17:00 )  PTT:29.2 sec  Urinalysis Basic - ( 19 Aug 2024 17:00 )    Color: x / Appearance: x / SG: x / pH: x  Gluc: 98 mg/dL / Ketone: x  / Bili: x / Urobili: x   Blood: x / Protein: x / Nitrite: x   Leuk Esterase: x / RBC: x / WBC x   Sq Epi: x / Non Sq Epi: x / Bacteria: x      I&O's Summary    BNP    RADIOLOGY & ADDITIONAL STUDIES:      ECG:  Ventricular Rate 57 BPM  Atrial Rate 57 BPM  P-R Interval 224 ms  QRS Duration 80 ms  Q-T Interval 422 ms  QTC Calculation(Bazett) 410 ms  P Axis 49 degrees  R Axis -15 degrees  T Axis 72 degrees    Diagnosis Line Sinus bradycardia with 1st degree AV block  Septal infarct (cited on or before 2024)  Confirmed by ABY HAND ([id number]) on 2024 7:39:03 AM    
Chief Complaint : Patient is a 59y old  Female who presents with a chief complaint of right ankle wound.   HPI : Patient is a 59y old  Female who presents with a chief complaint of right ankle wound. She is a patient of Dr. Merrill, and is s/p right lateral ankle lipoma excision. The patient subsequently formed a large hematoma at the surgical site, and has been being treated at the wound care center. She presents today with worsening of the hematoma and superficial skin necrosis.       Patient admits to  (-) Fevers, (-) Chills, (-) Nausea, (-) Vomiting, (-) Shortness of Breath      PMH: Prediabetes    Hypothyroidism    OA (osteoarthritis)    Polyp of corpus uteri    Postmenopausal bleeding    Localized swelling, mass and lump, unspecified lower limb    Abnormal EKG    Ankle mass    2019 novel coronavirus disease (COVID-19)    Bilateral ankle pain, unspecified chronicity    Finger pain    Pain in both feet    Primary osteoarthritis of both knees    Tinea unguium    Primary osteoarthritis of first carpometacarpal joint of right hand    Trochanteric bursitis of left hip    Asthma    Cervical polyp    Raynauds syndrome    ANISA (obstructive sleep apnea)      PSH:S/P  section    Elective surgery    History of colonoscopy    History of D&C    History of left hip replacement    H/O gastric sleeve        Allergies:No Known Allergies      Labs:      WBC Trend      Chem      O:   General: Pleasant  female NAD & AOX3.    Integument:  Skin warm, dry and supple bilateral.    Open wound to the right lateral ankle, with necrotic, dried hemorrhagic tissue noted. The wound extends down to the level of the deep fascia.  Vascular: Dorsalis Pedis and Posterior Tibial pulses 2/4.  Capillary re-fill time less then 3 seconds digits 1-5 bilateral.    Neuro: Protective sensation intact to the level of the digits bilateral.  MSK: Muscle strength 5/5 all major muscle groups bilateral.

## 2024-08-20 NOTE — CONSULT NOTE ADULT - ATTENDING COMMENTS
59y F, pmh prediabetes, hypothyroidism, osteoarthritis, trochanteric bursitis, asthma, Raynaud's,  obstructive sleep apnea, admitted for right leg wound debridement, cardiology consutled for cardiac clearance.      - Optimized for the OR from a cardiac point of view with no evidence of active ischemic heart disease, decompensated heart failure, severe obstructive valvular disease, or uncontrolled arrhythmia.
Agree with above findings and plan

## 2024-08-21 ENCOUNTER — APPOINTMENT (OUTPATIENT)
Dept: WOUND CARE | Facility: HOSPITAL | Age: 59
End: 2024-08-21

## 2024-08-21 LAB
GRAM STN FLD: SIGNIFICANT CHANGE UP
SPECIMEN SOURCE: SIGNIFICANT CHANGE UP

## 2024-08-21 PROCEDURE — 99232 SBSQ HOSP IP/OBS MODERATE 35: CPT

## 2024-08-21 RX ORDER — HYDROMORPHONE HYDROCHLORIDE 2 MG/1
0.5 TABLET ORAL EVERY 6 HOURS
Refills: 0 | Status: DISCONTINUED | OUTPATIENT
Start: 2024-08-21 | End: 2024-08-22

## 2024-08-21 RX ORDER — HYDROMORPHONE HYDROCHLORIDE 2 MG/1
0.5 TABLET ORAL EVERY 4 HOURS
Refills: 0 | Status: DISCONTINUED | OUTPATIENT
Start: 2024-08-21 | End: 2024-08-21

## 2024-08-21 RX ORDER — KETOROLAC TROMETHAMINE 30 MG/ML
15 INJECTION, SOLUTION INTRAMUSCULAR EVERY 4 HOURS
Refills: 0 | Status: DISCONTINUED | OUTPATIENT
Start: 2024-08-21 | End: 2024-08-23

## 2024-08-21 RX ORDER — HYDROMORPHONE HYDROCHLORIDE 2 MG/1
0.5 TABLET ORAL ONCE
Refills: 0 | Status: DISCONTINUED | OUTPATIENT
Start: 2024-08-21 | End: 2024-08-21

## 2024-08-21 RX ORDER — TRAMADOL HYDROCHLORIDE 200 MG/1
50 TABLET, EXTENDED RELEASE ORAL EVERY 6 HOURS
Refills: 0 | Status: DISCONTINUED | OUTPATIENT
Start: 2024-08-21 | End: 2024-08-23

## 2024-08-21 RX ADMIN — Medication 5000 UNIT(S): at 18:18

## 2024-08-21 RX ADMIN — TRAMADOL HYDROCHLORIDE 50 MILLIGRAM(S): 200 TABLET, EXTENDED RELEASE ORAL at 14:24

## 2024-08-21 RX ADMIN — Medication 10 MILLIEQUIVALENT(S): at 11:48

## 2024-08-21 RX ADMIN — CEFAZOLIN SODIUM 100 MILLIGRAM(S): 2 INJECTION, SOLUTION INTRAVENOUS at 06:08

## 2024-08-21 RX ADMIN — Medication 5000 UNIT(S): at 06:07

## 2024-08-21 RX ADMIN — HYDROMORPHONE HYDROCHLORIDE 0.5 MILLIGRAM(S): 2 TABLET ORAL at 02:00

## 2024-08-21 RX ADMIN — CEFAZOLIN SODIUM 100 MILLIGRAM(S): 2 INJECTION, SOLUTION INTRAVENOUS at 13:54

## 2024-08-21 RX ADMIN — HYDROMORPHONE HYDROCHLORIDE 0.5 MILLIGRAM(S): 2 TABLET ORAL at 06:57

## 2024-08-21 RX ADMIN — KETOROLAC TROMETHAMINE 15 MILLIGRAM(S): 30 INJECTION, SOLUTION INTRAMUSCULAR at 19:18

## 2024-08-21 RX ADMIN — KETOROLAC TROMETHAMINE 15 MILLIGRAM(S): 30 INJECTION, SOLUTION INTRAMUSCULAR at 18:18

## 2024-08-21 RX ADMIN — HYDROMORPHONE HYDROCHLORIDE 0.5 MILLIGRAM(S): 2 TABLET ORAL at 21:53

## 2024-08-21 RX ADMIN — CEFAZOLIN SODIUM 100 MILLIGRAM(S): 2 INJECTION, SOLUTION INTRAVENOUS at 21:08

## 2024-08-21 RX ADMIN — TRAMADOL HYDROCHLORIDE 50 MILLIGRAM(S): 200 TABLET, EXTENDED RELEASE ORAL at 15:25

## 2024-08-21 RX ADMIN — HYDROMORPHONE HYDROCHLORIDE 0.5 MILLIGRAM(S): 2 TABLET ORAL at 22:15

## 2024-08-21 RX ADMIN — HYDROMORPHONE HYDROCHLORIDE 0.5 MILLIGRAM(S): 2 TABLET ORAL at 00:47

## 2024-08-21 RX ADMIN — HYDROMORPHONE HYDROCHLORIDE 0.5 MILLIGRAM(S): 2 TABLET ORAL at 06:07

## 2024-08-21 NOTE — CARE COORDINATION ASSESSMENT. - OTHER PERTINENT REFERRAL INFORMATION
Spoke with patient at bedside.   Role of case management explained w verbalized understanding. Patient states she lives with her spouse in a PH . She was independent PTA w/o a device, no HCS. Pt currently has a wound VAC in place. She states if she is DCD w wound VAC, she may follow at [facility] 3xwk for changes or a VN would be needed 3xwk.  DC needs pending hosp course and podiatry recommended D/C  F/U. CM to follow

## 2024-08-21 NOTE — CASE MANAGEMENT PROGRESS NOTE - NSCMPROGRESSNOTE_GEN_ALL_CORE
Spoke with Vini at [facility] [phone] , Vac has been approved and delivery will be made bedside later today or tmrw . Patient aware and receptive. Vac to be changed at [facility] 3x wk initially. Patient states she has no problem getting to center . CM to follow

## 2024-08-21 NOTE — CARE COORDINATION ASSESSMENT. - NSCAREPROVIDERS_GEN_ALL_CORE_FT
CARE PROVIDERS:  Accepting Physician: Dago Morgan  Administration: Vicente Avelar  Admitting: Dago Morgan  Attending: Dago Morgan  Consultant: Hakeem Mcgee  Consultant: Apollo Pacheco  Consultant: Gerardo Brandon  Consultant: Keke Lucas  Consultant: Shannon Lugo  Consultant: Ruben Olea  Consultant: Pan Catalan  Covering Team: Dave Graf  Covering Team: Akua Kahn  Covering Team: Perlman, Daryl  ED Attending: Seema Robert  ED Nurse: Mariah Herndon  Nurse: Lucy Meléndez  Nurse: Fabiana Sánchez  Nurse: Felicia Gallegos  Ordered: ADM, User  Ordered: Doctor, Unknown  Ordered: ServiceAccount, [facility]  Outpatient Provider: Amico, Frank  Override: Mariah Tellez  Override: Annie Parker  Override: Luis Carlos Matos  Override: Laly Mae  PCA/Nursing Assistant: Simona Hernandez  Registered Dietitian: Coretta Marion  Registered Dietitian: Nadia Renee// Supp. Assoc.: Rafia Crockett

## 2024-08-21 NOTE — DIETITIAN INITIAL EVALUATION ADULT - OTHER INFO
58 y/o female adm with open wound right ankle. S/P OR 8/20 for debridement of wound.  PMH ANISA, raynaud's syndrome, asthma, ankle mass, hypothyroidism, preDM, gastric sleeve. Pt visited at bedside this morning. Pt reports appetite is good, tolerating diet, no complaints. Pt with h/o gastric sleeve 5 yrs ago. Pt has lost 85# and has kept it off. Will rx Jorge BID (pt educated on supplement) for wound healing. Pending order placed and podiatrist resident aware and will activate.

## 2024-08-21 NOTE — DIETITIAN INITIAL EVALUATION ADULT - PERTINENT LABORATORY DATA
08-19    140  |  106  |  22  ----------------------------<  98  3.7   |  29  |  0.79    Ca    9.6      19 Aug 2024 17:00    TPro  7.4  /  Alb  3.7  /  TBili  0.4  /  DBili  x   /  AST  19  /  ALT  17  /  AlkPhos  87  08-19  A1C with Estimated Average Glucose Result: 5.4 % (07-24-24 @ 09:00)

## 2024-08-21 NOTE — DIETITIAN INITIAL EVALUATION ADULT - PERTINENT MEDS FT
MEDICATIONS  (STANDING):  ceFAZolin   IVPB 1000 milliGRAM(s) IV Intermittent every 8 hours  heparin   Injectable 5000 Unit(s) SubCutaneous every 12 hours  HYDROmorphone  Injectable 0.5 milliGRAM(s) IV Push every 6 hours  lactated ringers. 1000 milliLiter(s) (75 mL/Hr) IV Continuous <Continuous>  levothyroxine 125 MICROGram(s) Oral daily  potassium chloride    Tablet ER 10 milliEquivalent(s) Oral daily    MEDICATIONS  (PRN):  aluminum hydroxide/magnesium hydroxide/simethicone Suspension 30 milliLiter(s) Oral every 4 hours PRN Dyspepsia  melatonin 3 milliGRAM(s) Oral at bedtime PRN Insomnia  ondansetron Injectable 4 milliGRAM(s) IV Push once PRN Nausea and/or Vomiting  ondansetron Injectable 4 milliGRAM(s) IV Push every 8 hours PRN Nausea and/or Vomiting  oxyCODONE    IR 5 milliGRAM(s) Oral once PRN Mild Pain (1 - 3)

## 2024-08-21 NOTE — DIETITIAN INITIAL EVALUATION ADULT - PROBLEM SELECTOR PLAN 5
Wound care consult  cefazolin   IVPB 1000 liv GRAM(s) IV Intermittent every 8 hours  oxyCODONE    IR 5 milliGRAM(s) Oral once PRN Mild Pain (1 - 3)
clears

## 2024-08-21 NOTE — CASE MANAGEMENT PROGRESS NOTE - NSCMPROGRESSNOTE_GEN_ALL_CORE
Spoke with Dr Catalan podiatry. He states patient will need wound VAC for home. Forms completed and sent to [facility] -[facility] Vac negative pressure wound care therapy supplies- phone [phone]/ fax [phone] for delivery to hospital prior to DC . Patient is to F/u w [facility] for 3xwk Vac changes, no home care services needed . CM to follow

## 2024-08-21 NOTE — CASE MANAGEMENT PROGRESS NOTE - NSCMPROGRESSNOTE_GEN_ALL_CORE
Spoke with Gisel at [facility], she recd  necessary forms. She also requested H&P,OP Note ,and prog note-  and sent to [phone]. Requested delivery of wound Vac to bedside tmrw. CM to continut to follow to expediate

## 2024-08-21 NOTE — CARE COORDINATION ASSESSMENT. - NSPASTMEDSURGHISTORY_GEN_ALL_CORE_FT
PAST MEDICAL & SURGICAL HISTORY:  Polyp of corpus uteri      OA (osteoarthritis)      Hypothyroidism      Prediabetes      History of colonoscopy  (2018)      Elective surgery  (Right big toe joint replacement, )      S/P  section  ()      Postmenopausal bleeding      H/O gastric sleeve      History of left hip replacement      History of D&C      ANISA (obstructive sleep apnea)      Raynauds syndrome      Cervical polyp      Asthma      Trochanteric bursitis of left hip      Primary osteoarthritis of first carpometacarpal joint of right hand      Tinea unguium      Primary osteoarthritis of both knees      Pain in both feet      Finger pain      Bilateral ankle pain, unspecified chronicity      2019 novel coronavirus disease (COVID-19)      Ankle mass      Abnormal EKG      Localized swelling, mass and lump, unspecified lower limb

## 2024-08-22 ENCOUNTER — TRANSCRIPTION ENCOUNTER (OUTPATIENT)
Age: 59
End: 2024-08-22

## 2024-08-22 DIAGNOSIS — Z80.8 FAMILY HISTORY OF MALIGNANT NEOPLASM OF OTHER ORGANS OR SYSTEMS: ICD-10-CM

## 2024-08-22 DIAGNOSIS — Z96.649 PRESENCE OF UNSPECIFIED ARTIFICIAL HIP JOINT: ICD-10-CM

## 2024-08-22 DIAGNOSIS — I10 ESSENTIAL (PRIMARY) HYPERTENSION: ICD-10-CM
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
## 2024-08-22 DIAGNOSIS — E03.9 HYPOTHYROIDISM, UNSPECIFIED: ICD-10-CM

## 2024-08-22 DIAGNOSIS — Z83.3 FAMILY HISTORY OF DIABETES MELLITUS: ICD-10-CM

## 2024-08-22 DIAGNOSIS — Z98.84 BARIATRIC SURGERY STATUS: ICD-10-CM

## 2024-08-22 DIAGNOSIS — T81.31XA DISRUPTION OF EXTERNAL OPERATION (SURGICAL) WOUND, NOT ELSEWHERE CLASSIFIED, INITIAL ENCOUNTER: ICD-10-CM

## 2024-08-22 DIAGNOSIS — Z82.49 FAMILY HISTORY OF ISCHEMIC HEART DISEASE AND OTHER DISEASES OF THE CIRCULATORY SYSTEM: ICD-10-CM

## 2024-08-22 DIAGNOSIS — Z86.16 PERSONAL HISTORY OF COVID-19: ICD-10-CM
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
## 2024-08-22 DIAGNOSIS — Z98.890 OTHER SPECIFIED POSTPROCEDURAL STATES: ICD-10-CM

## 2024-08-22 DIAGNOSIS — Z87.09 PERSONAL HISTORY OF OTHER DISEASES OF THE RESPIRATORY SYSTEM: ICD-10-CM

## 2024-08-22 DIAGNOSIS — G47.33 OBSTRUCTIVE SLEEP APNEA (ADULT) (PEDIATRIC): ICD-10-CM

## 2024-08-22 DIAGNOSIS — L97.312 NON-PRESSURE CHRONIC ULCER OF RIGHT ANKLE WITH FAT LAYER EXPOSED: ICD-10-CM

## 2024-08-22 DIAGNOSIS — I73.00 RAYNAUD'S SYNDROME WITHOUT GANGRENE: ICD-10-CM

## 2024-08-22 DIAGNOSIS — Z80.41 FAMILY HISTORY OF MALIGNANT NEOPLASM OF OVARY: ICD-10-CM

## 2024-08-22 DIAGNOSIS — Z83.511 FAMILY HISTORY OF GLAUCOMA: ICD-10-CM

## 2024-08-22 LAB
ANION GAP SERPL CALC-SCNC: 3 MMOL/L — LOW (ref 5–17)
BUN SERPL-MCNC: 22 MG/DL — SIGNIFICANT CHANGE UP (ref 7–23)
CALCIUM SERPL-MCNC: 10.3 MG/DL — HIGH (ref 8.5–10.1)
CHLORIDE SERPL-SCNC: 107 MMOL/L — SIGNIFICANT CHANGE UP (ref 96–108)
CO2 SERPL-SCNC: 32 MMOL/L — HIGH (ref 22–31)
CREAT SERPL-MCNC: 0.83 MG/DL — SIGNIFICANT CHANGE UP (ref 0.5–1.3)
EGFR: 82 ML/MIN/1.73M2 — SIGNIFICANT CHANGE UP
GLUCOSE SERPL-MCNC: 81 MG/DL — SIGNIFICANT CHANGE UP (ref 70–99)
HCT VFR BLD CALC: 42.3 % — SIGNIFICANT CHANGE UP (ref 34.5–45)
HGB BLD-MCNC: 13.7 G/DL — SIGNIFICANT CHANGE UP (ref 11.5–15.5)
MCHC RBC-ENTMCNC: 30.8 PG — SIGNIFICANT CHANGE UP (ref 27–34)
MCHC RBC-ENTMCNC: 32.4 GM/DL — SIGNIFICANT CHANGE UP (ref 32–36)
MCV RBC AUTO: 95.1 FL — SIGNIFICANT CHANGE UP (ref 80–100)
NRBC # BLD: 0 /100 WBCS — SIGNIFICANT CHANGE UP (ref 0–0)
PLATELET # BLD AUTO: 290 K/UL — SIGNIFICANT CHANGE UP (ref 150–400)
POTASSIUM SERPL-MCNC: 4.5 MMOL/L — SIGNIFICANT CHANGE UP (ref 3.5–5.3)
POTASSIUM SERPL-SCNC: 4.5 MMOL/L — SIGNIFICANT CHANGE UP (ref 3.5–5.3)
RBC # BLD: 4.45 M/UL — SIGNIFICANT CHANGE UP (ref 3.8–5.2)
RBC # FLD: 13.7 % — SIGNIFICANT CHANGE UP (ref 10.3–14.5)
SODIUM SERPL-SCNC: 142 MMOL/L — SIGNIFICANT CHANGE UP (ref 135–145)
WBC # BLD: 6.04 K/UL — SIGNIFICANT CHANGE UP (ref 3.8–10.5)
WBC # FLD AUTO: 6.04 K/UL — SIGNIFICANT CHANGE UP (ref 3.8–10.5)

## 2024-08-22 PROCEDURE — 99232 SBSQ HOSP IP/OBS MODERATE 35: CPT

## 2024-08-22 RX ORDER — HYDROMORPHONE HYDROCHLORIDE 2 MG/1
0.5 TABLET ORAL ONCE
Refills: 0 | Status: DISCONTINUED | OUTPATIENT
Start: 2024-08-22 | End: 2024-08-22

## 2024-08-22 RX ORDER — TRAMADOL HYDROCHLORIDE 200 MG/1
25 TABLET, EXTENDED RELEASE ORAL EVERY 4 HOURS
Refills: 0 | Status: DISCONTINUED | OUTPATIENT
Start: 2024-08-22 | End: 2024-08-23

## 2024-08-22 RX ORDER — CLOTRIMAZOLE 1 %
1 CREAM (GRAM) TOPICAL ONCE
Refills: 0 | Status: COMPLETED | OUTPATIENT
Start: 2024-08-22 | End: 2024-08-22

## 2024-08-22 RX ORDER — HEPARIN SODIUM,BOVINE 1000/ML
5000 VIAL (ML) INJECTION EVERY 8 HOURS
Refills: 0 | Status: DISCONTINUED | OUTPATIENT
Start: 2024-08-22 | End: 2024-08-23

## 2024-08-22 RX ADMIN — Medication 125 MICROGRAM(S): at 05:10

## 2024-08-22 RX ADMIN — Medication 10 MILLIEQUIVALENT(S): at 12:10

## 2024-08-22 RX ADMIN — KETOROLAC TROMETHAMINE 15 MILLIGRAM(S): 30 INJECTION, SOLUTION INTRAMUSCULAR at 19:44

## 2024-08-22 RX ADMIN — HYDROMORPHONE HYDROCHLORIDE 0.5 MILLIGRAM(S): 2 TABLET ORAL at 23:31

## 2024-08-22 RX ADMIN — CEFAZOLIN SODIUM 100 MILLIGRAM(S): 2 INJECTION, SOLUTION INTRAVENOUS at 21:41

## 2024-08-22 RX ADMIN — KETOROLAC TROMETHAMINE 15 MILLIGRAM(S): 30 INJECTION, SOLUTION INTRAMUSCULAR at 05:11

## 2024-08-22 RX ADMIN — HYDROMORPHONE HYDROCHLORIDE 0.5 MILLIGRAM(S): 2 TABLET ORAL at 22:46

## 2024-08-22 RX ADMIN — KETOROLAC TROMETHAMINE 15 MILLIGRAM(S): 30 INJECTION, SOLUTION INTRAMUSCULAR at 11:00

## 2024-08-22 RX ADMIN — Medication 1 APPLICATION(S): at 23:53

## 2024-08-22 RX ADMIN — KETOROLAC TROMETHAMINE 15 MILLIGRAM(S): 30 INJECTION, SOLUTION INTRAMUSCULAR at 15:00

## 2024-08-22 RX ADMIN — KETOROLAC TROMETHAMINE 15 MILLIGRAM(S): 30 INJECTION, SOLUTION INTRAMUSCULAR at 14:33

## 2024-08-22 RX ADMIN — KETOROLAC TROMETHAMINE 15 MILLIGRAM(S): 30 INJECTION, SOLUTION INTRAMUSCULAR at 10:11

## 2024-08-22 RX ADMIN — CEFAZOLIN SODIUM 100 MILLIGRAM(S): 2 INJECTION, SOLUTION INTRAVENOUS at 05:10

## 2024-08-22 RX ADMIN — KETOROLAC TROMETHAMINE 15 MILLIGRAM(S): 30 INJECTION, SOLUTION INTRAMUSCULAR at 18:49

## 2024-08-22 RX ADMIN — CEFAZOLIN SODIUM 100 MILLIGRAM(S): 2 INJECTION, SOLUTION INTRAVENOUS at 14:34

## 2024-08-22 RX ADMIN — Medication 5000 UNIT(S): at 05:10

## 2024-08-22 RX ADMIN — Medication 5000 UNIT(S): at 22:37

## 2024-08-22 NOTE — REASON FOR VISIT
[FreeTextEntry4] : patient seen status post right ankle excision of a lipoma (DOS: 7/30/2024).  Dehiscence of the incision noted with open wound down to skin, subcutaneous tissue, fat with serous drainage and necrosis of the skin. [FreeTextEntry5] : Right ankle

## 2024-08-22 NOTE — PHYSICAL EXAM
[4 x 4] : 4 x 4  [Abdominal Pad] : Abdominal Pad [2+] : left 2+ [Ankle Swelling (On Exam)] : not present [Varicose Veins Of Lower Extremities] : not present [] : not present [de-identified] : A&Ox3, NAD [de-identified] : 5 out of 5 strength in all quadrants bilaterally, ankle joint and subtalar joint range of motion intact [de-identified] : Right lateral foot incision dehiscence with necrotic tissue and serosanguineous and open wound down to skin, subcutaneous tissue, fat drainage [de-identified] : Light touch sensation intact bilaterally [FreeTextEntry1] : Right Foot, S/P Lipoma Excision- sutures removed by DPM [FreeTextEntry2] : 3.5 [FreeTextEntry3] : 2.5 [FreeTextEntry4] : 1.0 [de-identified] : sanguineous [de-identified] : none [de-identified] : surgical [de-identified] : 12-3 o'clock extends 3.0cm [de-identified] : mild ecchymosis  [de-identified] : none [de-identified] : none [de-identified] : none [de-identified] :  Post compression placement assessment: -circulation WNL -patient states full comfort and full ROM -two fingers can slide in when assessed [de-identified] :  Silver Alginate [de-identified] : Mechanically cleansed with sterile gauze and normal saline 0.9% kerlix  Post procedure measurement: 3.6cmx 2.6cmx 1.1cm [TWNoteComboBox4] : Moderate [TWNoteComboBox6] : Surgical [de-identified] : Yes [TWNoteComboBox7] : Kel [de-identified] : Debridement performed of all devitalized tissue to bleeding viable tissue [de-identified] : 3x Weekly [de-identified] : Ace wraps [de-identified] : Primary Dressing

## 2024-08-22 NOTE — PROGRESS NOTE ADULT - PROBLEM SELECTOR PLAN 5
local wound care  pod 2 OR debridement  surgical / podiatry eval appreciated  now with  wound vac  IV abx per ID   follow up cultures
local wound care  ? OR debridement  surgical / podiatry eval
local wound care  pod 1 OR debridement  surgical / podiatry eval appreciated  will likely require wound vac  IV abx, follow up cultures

## 2024-08-22 NOTE — REVIEW OF SYSTEMS
[Negative] : Heme/Lymph [FreeTextEntry1] : 14:58 [de-identified] : painful right ankle soft tissue mass

## 2024-08-22 NOTE — PHYSICAL EXAM
[4 x 4] : 4 x 4  [Abdominal Pad] : Abdominal Pad [2+] : left 2+ [Ankle Swelling (On Exam)] : not present [Varicose Veins Of Lower Extremities] : not present [] : not present [de-identified] : A&Ox3, NAD [de-identified] : 5 out of 5 strength in all quadrants bilaterally, ankle joint and subtalar joint range of motion intact [de-identified] : Right lateral foot incision dehiscence with necrotic tissue and serosanguineous and open wound down to skin, subcutaneous tissue, fat drainage [de-identified] : Light touch sensation intact bilaterally [FreeTextEntry1] : Right Foot, S/P Lipoma Excision- sutures removed by DPM [FreeTextEntry2] : 3.5 [FreeTextEntry3] : 2.5 [FreeTextEntry4] : 1.0 [de-identified] : sanguineous [de-identified] : none [de-identified] : surgical [de-identified] : 12-3 o'clock extends 3.0cm [de-identified] : mild ecchymosis  [de-identified] : none [de-identified] : none [de-identified] : none [de-identified] :  Post compression placement assessment: -circulation WNL -patient states full comfort and full ROM -two fingers can slide in when assessed [de-identified] :  Silver Alginate [de-identified] : Mechanically cleansed with sterile gauze and normal saline 0.9% kerlix  Post procedure measurement: 3.6cmx 2.6cmx 1.1cm [TWNoteComboBox4] : Moderate [TWNoteComboBox6] : Surgical [de-identified] : Yes [TWNoteComboBox7] : Kel [de-identified] : Debridement performed of all devitalized tissue to bleeding viable tissue [de-identified] : Ace wraps [de-identified] : 3x Weekly [de-identified] : Primary Dressing

## 2024-08-22 NOTE — DISCHARGE NOTE NURSING/CASE MANAGEMENT/SOCIAL WORK - PATIENT PORTAL LINK FT
You can access the FollowMyHealth Patient Portal offered by [facility] by registering at the following website: [email or web address]. By joining [facility]’s FollowMyHealth portal, you will also be able to view your health information using other applications (apps) compatible with our system.

## 2024-08-22 NOTE — REASON FOR VISIT
[FreeTextEntry5] : Right ankle [FreeTextEntry4] : patient seen status post right ankle excision of a lipoma (DOS: 7/30/2024).  Dehiscence of the incision noted with open wound down to skin, subcutaneous tissue, fat with serous drainage and necrosis of the skin.

## 2024-08-22 NOTE — PROCEDURE
[Necrotic] : necrotic [Scalpel] : scalpel [Skin] : skin [Fat] : fat [Subcutaneous tissue] : subcutaneous tissue [Clean] : clean [Pressure] : pressure [FreeTextEntry2] : Right foot [FreeTextEntry1] : Silver alginate, dry dressing, Ace bandage [FreeTextEntry9] : 15:16 [de-identified] : right foot open wound down to skin, subcutaneous tissue, fat [de-identified] : scar [FreeTextEntry6] : right foot open wound down to skin, subcutaneous tissue, fat [de-identified] : crow [FreeTextEntry7] : right foot open wound down to skin, subcutaneous tissue, fat [de-identified] : 3mL [de-identified] : skin, subcutaneous tissue, fat

## 2024-08-22 NOTE — CASE MANAGEMENT PROGRESS NOTE - NSCMPROGRESSNOTE_GEN_ALL_CORE
Vac for home placed today by podiatry.Pt will follow for changes at [facility] 3xwk. Dr Lugo rec cefazolin 2gm daily thru 9/2 . Midline placement hopefully tmrw. Referral sent for insurance check to [facility] and covered 100%. Alert agency when DCd. No [facility] needs. Patient aware and recptive  Family to transport. CM to follow

## 2024-08-22 NOTE — CASE MANAGEMENT PROGRESS NOTE - NSCMPROGRESSNOTE_GEN_ALL_CORE
Vac for home placed today by podiatry.Pt will follow for changes at [facility] 3xwk. Dr Lugo rec cefazolin 2gm daily thru 9/2 . Referral sent for insurance check to [facility] and covered 100%. Alert agency when DCd. No skilled [facility]HA needs. CM to follow

## 2024-08-22 NOTE — ASSESSMENT
[Verbal] : Verbal [Demo] : Demo [Patient] : Patient [Family member] : Family member [Good - alert, interested, motivated] : Good - alert, interested, motivated [Verbalizes knowledge/Understanding] : Verbalizes knowledge/understanding [Dressing changes] : dressing changes [Foot Care] : foot care [Skin Care] : skin care [Pressure relief] : pressure relief [Signs and symptoms of infection] : sign and symptoms of infection [Surgery] : surgery [Venous Disease] : venous disease [Nutrition] : nutrition [How and When to Call] : how and when to call [Labs and Tests] : labs and tests [Pain Management] : pain management [Off-loading] : off-loading [Compression Therapy] : compression therapy [Patient responsibility to plan of care] : patient responsibility to plan of care [Stable] : stable [Emergency Room] : Emergency Room [Wheelchair] : Wheelchair [Not Applicable - Long Term Care/Home Health Agency] : Long Term Care/Home Health Agency: Not Applicable [] : No [FreeTextEntry2] : Infection prevention Wound care (dressing changes) Maintain optimal skin integrity to high pressure areas Compression therapy Pressure relief/ Pressure redistribution Offloading the stress on skin structures and decreasing potential pathologic biomechanical influences. Hospitalization Sharp debridement [FreeTextEntry3] : Emergent debridement due to hematoma/scab formation [FreeTextEntry4] : F/U after hospitalization, sent to ER for sharp debridement and placement of NPWT and IV Antibiotic  Authorization submitted for emergent sharp debridement performed today Patient tolerated procedure well

## 2024-08-22 NOTE — REVIEW OF SYSTEMS
[Negative] : Heme/Lymph [FreeTextEntry1] : 14:58 [de-identified] : painful right ankle soft tissue mass

## 2024-08-22 NOTE — DISCHARGE NOTE NURSING/CASE MANAGEMENT/SOCIAL WORK - NSFLUVACAGEDISCH_IMM_ALL_CORE
Adult
moderate risk- past suicide attempt, current passive suicidal ideation, facing eviction from home, loss of job.     protective- seeking treatment, reports reason for living, futuristic.

## 2024-08-22 NOTE — DISCHARGE NOTE NURSING/CASE MANAGEMENT/SOCIAL WORK - NSDCPEFALRISK_GEN_ALL_CORE
For information on Fall & Injury Prevention, visit: [email or web address] OR  [email or web address] OR  https://www.cdc.gov/steadi/patient.html

## 2024-08-22 NOTE — PROCEDURE
[Necrotic] : necrotic [Scalpel] : scalpel [Skin] : skin [Fat] : fat [Subcutaneous tissue] : subcutaneous tissue [Clean] : clean [Pressure] : pressure [FreeTextEntry2] : Right foot [FreeTextEntry1] : Silver alginate, dry dressing, Ace bandage [FreeTextEntry9] : 15:16 [de-identified] : right foot open wound down to skin, subcutaneous tissue, fat [de-identified] : scar [de-identified] : crow [FreeTextEntry6] : right foot open wound down to skin, subcutaneous tissue, fat [FreeTextEntry7] : right foot open wound down to skin, subcutaneous tissue, fat [de-identified] : 3mL [de-identified] : skin, subcutaneous tissue, fat

## 2024-08-22 NOTE — VITALS
[Pain related to present condition?] : The patient's  pain is related to present condition. [Burning] : burning [Tender] : tender [] : No [de-identified] : 5/10 [FreeTextEntry3] : right lateral ankle [FreeTextEntry1] : tramadol, reduction of pressure, elevation [FreeTextEntry2] : walking , pressure [FreeTextEntry4] : leg elevation

## 2024-08-22 NOTE — PHYSICAL EXAM
[4 x 4] : 4 x 4  [Abdominal Pad] : Abdominal Pad [2+] : left 2+ [Ankle Swelling (On Exam)] : not present [Varicose Veins Of Lower Extremities] : not present [] : not present [de-identified] : A&Ox3, NAD [de-identified] : 5 out of 5 strength in all quadrants bilaterally, ankle joint and subtalar joint range of motion intact [de-identified] : Right lateral foot incision dehiscence with necrotic tissue and serosanguineous and open wound down to skin, subcutaneous tissue, fat drainage [de-identified] : Light touch sensation intact bilaterally [FreeTextEntry1] : Right Foot, S/P Lipoma Excision- sutures removed by DPM [FreeTextEntry2] : 3.5 [FreeTextEntry3] : 2.5 [FreeTextEntry4] : 1.0 [de-identified] : sanguineous [de-identified] : none [de-identified] : surgical [de-identified] : 12-3 o'clock extends 3.0cm [de-identified] : mild ecchymosis  [de-identified] : none [de-identified] : none [de-identified] : none [de-identified] :  Post compression placement assessment: -circulation WNL -patient states full comfort and full ROM -two fingers can slide in when assessed [de-identified] :  Silver Alginate [de-identified] : Mechanically cleansed with sterile gauze and normal saline 0.9% kerlix  Post procedure measurement: 3.6cmx 2.6cmx 1.1cm [TWNoteComboBox4] : Moderate [TWNoteComboBox6] : Surgical [de-identified] : Yes [TWNoteComboBox7] : Kel [de-identified] : Debridement performed of all devitalized tissue to bleeding viable tissue [de-identified] : 3x Weekly [de-identified] : Ace wraps [de-identified] : Primary Dressing

## 2024-08-22 NOTE — PROGRESS NOTE ADULT - NS ATTEND AMEND GEN_ALL_CORE FT
I have personally seen and examined the patient in detail.  I have spoken to the provider regarding the assessment and plan of care.  I have made changes to the note accordingly.    58 yo F with PMHx of prediabetes, hypothyroidism, osteoarthritis, trochanteric bursitis, asthma, Raynaud's,  obstructive sleep apnea, admitted for right leg wound debridement    - r foot wound debridement 8/20, tolerated procedure well from cv standpoint  - EKG Sinus bradycardia with 1st degree AV block, unchanged from previous
60 yo F with PMHx of prediabetes, hypothyroidism, osteoarthritis, trochanteric bursitis, asthma, Raynaud's,  obstructive sleep apnea, admitted for right leg wound debridement, cardiology consulted for cardiac clearance.    - S/p r foot wound debridement 8/20  - Tolerated procedure well, cardiac status optimal post operatively   - No evidence of any active ischemia   - No evidence of any meaningful volume overload  - BP stable and controlled

## 2024-08-22 NOTE — VITALS
[Pain related to present condition?] : The patient's  pain is related to present condition. [Burning] : burning [Tender] : tender [] : No [de-identified] : 5/10 [FreeTextEntry1] : tramadol, reduction of pressure, elevation [FreeTextEntry3] : right lateral ankle [FreeTextEntry2] : walking , pressure [FreeTextEntry4] : leg elevation

## 2024-08-22 NOTE — VITALS
[Pain related to present condition?] : The patient's  pain is related to present condition. [Burning] : burning [Tender] : tender [] : No [de-identified] : 5/10 [FreeTextEntry1] : tramadol, reduction of pressure, elevation [FreeTextEntry3] : right lateral ankle [FreeTextEntry2] : walking , pressure [FreeTextEntry4] : leg elevation

## 2024-08-22 NOTE — REVIEW OF SYSTEMS
[Negative] : Heme/Lymph [FreeTextEntry1] : 14:58 [de-identified] : painful right ankle soft tissue mass

## 2024-08-22 NOTE — PROCEDURE
[Necrotic] : necrotic [Scalpel] : scalpel [Skin] : skin [Fat] : fat [Subcutaneous tissue] : subcutaneous tissue [Clean] : clean [Pressure] : pressure [FreeTextEntry2] : Right foot [FreeTextEntry1] : Silver alginate, dry dressing, Ace bandage [FreeTextEntry9] : 15:16 [de-identified] : right foot open wound down to skin, subcutaneous tissue, fat [de-identified] : scar [FreeTextEntry6] : right foot open wound down to skin, subcutaneous tissue, fat [de-identified] : crow [FreeTextEntry7] : right foot open wound down to skin, subcutaneous tissue, fat [de-identified] : 3mL [de-identified] : skin, subcutaneous tissue, fat

## 2024-08-22 NOTE — CHART NOTE - NSCHARTNOTEFT_GEN_A_CORE
RN called to state patient has two queries. Patient states she normally has a red rash in her groin and would like to use her home Clotrimazole which she buys otc from [employer]. States that it is not bothering her much at this moment, but she does not want it to get worse as it impedes her from sleeping at home. Patient also states that she thought she had prn Dilaudid for pain. States she currently has 8/10 pain and received Dilaudid last night which helped her with her pain and allowed her to sleep. Ordered 0.5mg Dilaudid and hold tramadol at this time. Told that sh RN called to state patient has two queries. Patient states she normally has a red rash in her groin and would like to use her home Clotrimazole which she buys otc from [employer]. States that it is not bothering her much at this moment, but she does not want it to get worse as it impedes her from sleeping at home. Will order clotrimazole 1% cream at this time. Patient can bring in her home ointment to be verified with pharmacy tomorrow if she so chooses.    Patient also states that she thought she had prn Dilaudid for pain. States she currently has 8/10 pain and received Dilaudid last night which helped her with her pain and allowed her to sleep. Ordered 0.5mg Dilaudid and RN instructed to hold tramadol at this time. Told that she will not get further breakthrough doses tonight and can follow her prn regimen set by day team.

## 2024-08-23 ENCOUNTER — APPOINTMENT (OUTPATIENT)
Dept: WOUND CARE | Facility: HOSPITAL | Age: 59
End: 2024-08-23

## 2024-08-23 ENCOUNTER — TRANSCRIPTION ENCOUNTER (OUTPATIENT)
Age: 59
End: 2024-08-23

## 2024-08-23 VITALS
SYSTOLIC BLOOD PRESSURE: 115 MMHG | RESPIRATION RATE: 18 BRPM | DIASTOLIC BLOOD PRESSURE: 72 MMHG | HEART RATE: 70 BPM | OXYGEN SATURATION: 94 % | TEMPERATURE: 98 F

## 2024-08-23 PROCEDURE — 99232 SBSQ HOSP IP/OBS MODERATE 35: CPT

## 2024-08-23 PROCEDURE — 36573 INSJ PICC RS&I 5 YR+: CPT | Mod: 53

## 2024-08-23 RX ORDER — OXYCODONE HYDROCHLORIDE 5 MG/1
2.5 TABLET ORAL EVERY 4 HOURS
Refills: 0 | Status: DISCONTINUED | OUTPATIENT
Start: 2024-08-23 | End: 2024-08-23

## 2024-08-23 RX ORDER — CALCIUM CARBONATE/VITAMIN D3 500MG-5MCG
1 TABLET ORAL DAILY
Refills: 0 | Status: DISCONTINUED | OUTPATIENT
Start: 2024-08-23 | End: 2024-08-23

## 2024-08-23 RX ORDER — NYSTATIN 100000/G
1 CREAM (GRAM) TOPICAL
Refills: 0 | Status: DISCONTINUED | OUTPATIENT
Start: 2024-08-23 | End: 2024-08-23

## 2024-08-23 RX ORDER — FOLIC ACID/MULTIVIT,IRON,MINER 0.4MG-18MG
1000 TABLET,CHEWABLE ORAL DAILY
Refills: 0 | Status: DISCONTINUED | OUTPATIENT
Start: 2024-08-23 | End: 2024-08-23

## 2024-08-23 RX ORDER — TRAMADOL HYDROCHLORIDE 200 MG/1
1 TABLET, EXTENDED RELEASE ORAL
Qty: 0 | Refills: 0 | DISCHARGE
Start: 2024-08-23

## 2024-08-23 RX ORDER — ASCORBIC ACID/ASCORBATE SODIUM 500 MG
500 TABLET,CHEWABLE ORAL DAILY
Refills: 0 | Status: DISCONTINUED | OUTPATIENT
Start: 2024-08-23 | End: 2024-08-23

## 2024-08-23 RX ORDER — TRAMADOL HYDROCHLORIDE 200 MG/1
1 TABLET, EXTENDED RELEASE ORAL
Qty: 30 | Refills: 0
Start: 2024-08-23 | End: 2024-08-27

## 2024-08-23 RX ADMIN — Medication 5000 UNIT(S): at 15:45

## 2024-08-23 RX ADMIN — KETOROLAC TROMETHAMINE 15 MILLIGRAM(S): 30 INJECTION, SOLUTION INTRAMUSCULAR at 12:59

## 2024-08-23 RX ADMIN — KETOROLAC TROMETHAMINE 15 MILLIGRAM(S): 30 INJECTION, SOLUTION INTRAMUSCULAR at 05:09

## 2024-08-23 RX ADMIN — CEFAZOLIN SODIUM 100 MILLIGRAM(S): 2 INJECTION, SOLUTION INTRAVENOUS at 05:09

## 2024-08-23 RX ADMIN — Medication 5000 UNIT(S): at 05:10

## 2024-08-23 NOTE — PROGRESS NOTE ADULT - PROBLEM SELECTOR PROBLEM 1
Wound of right ankle
Hypothyroidism

## 2024-08-23 NOTE — PHYSICAL THERAPY INITIAL EVALUATION ADULT - LEVEL OF INDEPENDENCE: STAND/SIT, REHAB EVAL
Isha Hess is a 24 year old       female here for an IUD (intrauterine device) check.  She complains of nothing but denies pain, discharge and fever. On exam, positive findings include string visible in os.   independent

## 2024-08-23 NOTE — OCCUPATIONAL THERAPY INITIAL EVALUATION ADULT - PERTINENT HX OF CURRENT PROBLEM, REHAB EVAL
59-year-old female who presents to the emergency room with a right ankle wound.  Past medical history of prediabetes, hypothyroidism, osteoarthritis, trochanteric bursitis, asthma, Raynaud's,  obstructive sleep apnea, status post right lateral ankle lipoma excision and subsequently formed a large hematoma at the surgical site.  She has been following with the wound care center but worsening of the hematoma and superficial skin necrosis.  She was advised to come to the emergency room for further workup and admission.  Reports that the procedure was 3 weeks ago and initially after she was started on Keflex she had worsening symptoms and then was started on Augmentin which she completed yesterday.  Denies any fevers chills nausea vomiting chest pain or shortness of breath.  Does endorse significant pain at the site.     S/p wound debridement 8/20; pt has wound vac.

## 2024-08-23 NOTE — OCCUPATIONAL THERAPY INITIAL EVALUATION ADULT - NSOTDISCHREC_GEN_A_CORE
Rec assist for IADLs- pt agrees and reports family can assist PRN. ARLETTE Salvador made aware/No skilled OT needs

## 2024-08-23 NOTE — DISCHARGE NOTE PROVIDER - CARE PROVIDERS DIRECT ADDRESSES
,DirectAddress_Unknown,[email or web address],DirectAddress_Unknown

## 2024-08-23 NOTE — CASE MANAGEMENT PROGRESS NOTE - NSCMPROGRESSNOTE_GEN_ALL_CORE
I s/w dr Parada who states podiatry will change VAC sponge now, confirmed with ID Dr. Lugo who called Polly daily to [facility], and Dr. Morgan has patient DC completed after pt seen by podiatry. I called and spoke with Silas at [facility] who confirms VN is scheduled for tomorrow 2pm for first dose of Rocephin at home. Pt will have wound VAC dsg change at wound ctr 3x/week starting on Monday.  will transport home. CM will remain available

## 2024-08-23 NOTE — DISCHARGE NOTE PROVIDER - CARE PROVIDER_API CALL
Gerardo Brandon  [facility] Rearfoot/Ankle Surgery  [address] [place] [address]  Phone: [phone]  Fax: [phone]  Follow Up Time:     Shannon Lugo  Internal Medicine  [address] [place] [id number]  Phone: [phone]  Fax: [phone]  Follow Up Time:     armando sanchez  Phone: (   )    -  Fax: (   )    -  Follow Up Time:

## 2024-08-23 NOTE — PROGRESS NOTE ADULT - PROVIDER SPECIALTY LIST ADULT
Infectious Disease
Podiatry
Family Medicine
Cardiology
Podiatry
Cardiology
Cardiology
Infectious Disease
Infectious Disease
Podiatry
Family Medicine
Hospitalist

## 2024-08-23 NOTE — OCCUPATIONAL THERAPY INITIAL EVALUATION ADULT - GENERAL OBSERVATIONS, REHAB EVAL
Pt received in bathroom, wound vac to right ankle, in NAD. Call placed to [facility] to clarify WB status as pt ambulated ad kendra to bathroom with RW. As per podiatry, pt is WBAT RLE. Pt agrees to participate with OT for eval and ramírez well.

## 2024-08-23 NOTE — PHYSICAL THERAPY INITIAL EVALUATION ADULT - ASR WT BEARING STATUS EVAL
Per clarification with Dr. Spencer pt may be WBAT, updated activity order includes surgical shoe/Right LE Per clarification with Dr. Spencer (Podiatry Resident) pt may be WBAT, updated activity order includes surgical shoe/Right LE

## 2024-08-23 NOTE — PHYSICAL THERAPY INITIAL EVALUATION ADULT - PATIENT PROFILE REVIEW, REHAB EVAL
PT orders received: ambulate as tolerated. Consult with RN Felicia, pt may participate in PT evaluation./yes

## 2024-08-23 NOTE — DISCHARGE NOTE PROVIDER - PROVIDER TOKENS
PROVIDER:[TOKEN:[[id number]]],PROVIDER:[TOKEN:[[id number]]],FREE:[LAST:[daniel],FIRST:[armando],PHONE:[(   )    -],FAX:[(   )    -],ADDRESS:[[facility]]]

## 2024-08-23 NOTE — OCCUPATIONAL THERAPY INITIAL EVALUATION ADULT - ADDITIONAL COMMENTS
Pt lives in a private home with her family with 3 steps to enter (+) rail. Pt reports her bedroom is up 15 steps but she plans to stay on the 1st floor upon initial d/c. Pt reports there is a stall shower with a shower chair. Pt reports since her initial surgery she was independent with ADLs, had supervision/setup for showering (+) shower chair. Pt was using a knee scooter or standard walker for functional mobility. Family assisted with IADLs.

## 2024-08-23 NOTE — PHYSICAL THERAPY INITIAL EVALUATION ADULT - ADDITIONAL COMMENTS
Pt lives with her spouse, son and daughter in a private house, 3 ALLYN +HR and 15 steps within. Pt plans to sleep on main level. Pt typically independent in ambulation however after recent procedure in July was NWB for a brief time. Pt was able to perform stair negotiation with assistance from cane + family for safety. Pt has standard walker, knee scooter and cane. In bathroom has stall shower with shower chair.

## 2024-08-23 NOTE — DISCHARGE NOTE PROVIDER - HOSPITAL COURSE
admitted for right ankle post op wound  underwent OR debridement by podiatry  local wound care per podiatry  pain control with narcotics  later wound vac was applied  abx for ID for possible wound infection  PT and OT for ambulation  Dc after podiatry and ID claerance

## 2024-08-23 NOTE — PROGRESS NOTE ADULT - ATTENDING COMMENTS
60 yo F with PMHx of prediabetes, hypothyroidism, osteoarthritis, trochanteric bursitis, asthma, Raynaud's,  obstructive sleep apnea, admitted for right leg wound debridement, cardiology consulted for cardiac clearance.    - S/p r foot wound debridement 8/20  - Tolerated procedure well, cardiac status optimal post operatively   - No evidence of any active ischemia   - No evidence of any meaningful volume overload  - BP stable and controlled.
Agree with above findings and plan
Agree with above findings and plan

## 2024-08-23 NOTE — PROGRESS NOTE ADULT - SUBJECTIVE AND OBJECTIVE BOX
Date of Service 08-20-24 @ 14:10    Patient is a 59y old  Female who presents with a chief complaint of Right ankle open wound (20 Aug 2024 12:53)      INTERVAL /OVERNIGHT EVENTS: denies cp or sob    MEDICATIONS  (STANDING):  ceFAZolin   IVPB 1000 milliGRAM(s) IV Intermittent every 8 hours  heparin   Injectable 5000 Unit(s) SubCutaneous every 12 hours  levothyroxine 125 MICROGram(s) Oral daily  potassium chloride    Tablet ER 10 milliEquivalent(s) Oral daily    MEDICATIONS  (PRN):  acetaminophen     Tablet .. 650 milliGRAM(s) Oral every 6 hours PRN Temp greater or equal to 38C (100.4F), Mild Pain (1 - 3)  aluminum hydroxide/magnesium hydroxide/simethicone Suspension 30 milliLiter(s) Oral every 4 hours PRN Dyspepsia  melatonin 3 milliGRAM(s) Oral at bedtime PRN Insomnia  ondansetron Injectable 4 milliGRAM(s) IV Push every 8 hours PRN Nausea and/or Vomiting  traMADol 25 milliGRAM(s) Oral every 4 hours PRN Moderate Pain (4 - 6)  traMADol 50 milliGRAM(s) Oral four times a day PRN Severe Pain (7 - 10)      Allergies    No Known Allergies    Intolerances        REVIEW OF SYSTEMS:  CONSTITUTIONAL: No fever, weight loss, or fatigue  EYES: No eye pain, visual disturbances, or discharge  ENMT:  No difficulty hearing, tinnitus, vertigo; No sinus or throat pain  NECK: No pain or stiffness  RESPIRATORY: No cough, wheezing, chills or hemoptysis; No shortness of breath  CARDIOVASCULAR: No chest pain, palpitations, dizziness, or leg swelling  GASTROINTESTINAL: No abdominal or epigastric pain. No nausea, vomiting, or hematemesis; No diarrhea or constipation. No melena or hematochezia.  GENITOURINARY: No dysuria, frequency, hematuria, or incontinence  NEUROLOGICAL: No headaches, memory loss, loss of strength, numbness, or tremors  SKIN: wound on right foot / ankle  LYMPH NODES: No enlarged glands  ENDOCRINE: No heat or cold intolerance; No hair loss; No polydipsia or polyuria  MUSCULOSKELETAL: No joint pain or swelling; No muscle, back, or extremity pain  PSYCHIATRIC: No depression, anxiety, mood swings, or difficulty sleeping  HEME/LYMPH: No easy bruising, or bleeding gums  ALLERGY AND IMMUNOLOGIC: No hives or eczema    Vital Signs Last 24 Hrs  T(C): 36.7 (20 Aug 2024 13:54), Max: 37.2 (19 Aug 2024 23:51)  T(F): 98 (20 Aug 2024 13:54), Max: 98.9 (19 Aug 2024 23:51)  HR: 63 (20 Aug 2024 13:54) (60 - 72)  BP: 110/78 (20 Aug 2024 13:54) (110/78 - 126/84)  BP(mean): --  RR: 18 (20 Aug 2024 13:54) (17 - 18)  SpO2: 98% (20 Aug 2024 13:54) (97% - 98%)    Parameters below as of 20 Aug 2024 07:00  Patient On (Oxygen Delivery Method): room air        PHYSICAL EXAM:  GENERAL: NAD, well-groomed, well-developed  HEAD:  Atraumatic, Normocephalic  EYES: EOMI, PERRLA, conjunctiva and sclera clear  ENMT: No tonsillar erythema, exudates, or enlargement; Moist mucous membranes, Good dentition, No lesions  NECK: Supple, No JVD, Normal thyroid  NERVOUS SYSTEM:  Alert & Oriented X3, Good concentration; Motor Strength 5/5 B/L upper and lower extremities; DTRs 2+ intact and symmetric  CHEST/LUNG: Clear to auscultation bilaterally; No rales, rhonchi, wheezing, or rubs  HEART: Regular rate and rhythm; No murmurs, rubs, or gallops  ABDOMEN: Soft, Nontender, Nondistended; Bowel sounds present  EXTREMITIES:  2+ Peripheral Pulses, No clubbing, cyanosis, or edema  LYMPH: No lymphadenopathy noted  SKIN: right foot area lateral skin defect    LABS:                        13.6   7.29  )-----------( 303      ( 19 Aug 2024 17:00 )             41.5     19 Aug 2024 17:00    140    |  106    |  22     ----------------------------<  98     3.7     |  29     |  0.79     Ca    9.6        19 Aug 2024 17:00    TPro  7.4    /  Alb  3.7    /  TBili  0.4    /  DBili  x      /  AST  19     /  ALT  17     /  AlkPhos  87     19 Aug 2024 17:00    PT/INR - ( 19 Aug 2024 17:00 )   PT: 10.8 sec;   INR: 0.95 ratio         PTT - ( 19 Aug 2024 17:00 )  PTT:29.2 sec  Urinalysis Basic - ( 19 Aug 2024 17:00 )    Color: x / Appearance: x / SG: x / pH: x  Gluc: 98 mg/dL / Ketone: x  / Bili: x / Urobili: x   Blood: x / Protein: x / Nitrite: x   Leuk Esterase: x / RBC: x / WBC x   Sq Epi: x / Non Sq Epi: x / Bacteria: x      CAPILLARY BLOOD GLUCOSE          RADIOLOGY & ADDITIONAL TESTS:    Notes Reviewed:  [x ] YES  [ ] NO    Care Discussed with Consultants/Other Providers [x ] YES  [ ] NO
59y year old Female seen at [facility] 2NOR 239 W1 s/p right ankle wound debridement with wound vac application. Patient relates no overnight events and states that they are doing well at this time.  Denies any fever, chills, nausea, vomiting, chest pain, shortness of breath, or calf pain at this time.    Allergies    No Known Allergies    Intolerances        MEDICATIONS  (STANDING):  ceFAZolin   IVPB 1000 milliGRAM(s) IV Intermittent every 8 hours  heparin   Injectable 5000 Unit(s) SubCutaneous every 12 hours  levothyroxine 125 MICROGram(s) Oral daily  potassium chloride    Tablet ER 10 milliEquivalent(s) Oral daily    MEDICATIONS  (PRN):  aluminum hydroxide/magnesium hydroxide/simethicone Suspension 30 milliLiter(s) Oral every 4 hours PRN Dyspepsia  HYDROmorphone  Injectable 0.5 milliGRAM(s) IV Push every 4 hours PRN Severe Pain (7 - 10)  melatonin 3 milliGRAM(s) Oral at bedtime PRN Insomnia  ondansetron Injectable 4 milliGRAM(s) IV Push every 8 hours PRN Nausea and/or Vomiting  ondansetron Injectable 4 milliGRAM(s) IV Push once PRN Nausea and/or Vomiting  oxyCODONE    IR 5 milliGRAM(s) Oral once PRN Mild Pain (1 - 3)  traMADol 50 milliGRAM(s) Oral every 6 hours PRN Moderate Pain (4 - 6)      Vital Signs Last 24 Hrs  T(C): 36.6 (21 Aug 2024 11:07), Max: 36.8 (20 Aug 2024 18:07)  T(F): 97.8 (21 Aug 2024 11:07), Max: 98.2 (20 Aug 2024 18:07)  HR: 59 (21 Aug 2024 11:07) (57 - 93)  BP: 131/86 (21 Aug 2024 11:07) (112/70 - 131/86)  BP(mean): --  RR: 16 (21 Aug 2024 11:07) (14 - 18)  SpO2: 93% (21 Aug 2024 11:07) (92% - 100%)    Parameters below as of 21 Aug 2024 11:07  Patient On (Oxygen Delivery Method): room air        PHYSICAL EXAM:  Wound VAC checked today, noted to be in proper position and functioning. Approximately 10cc's of serous drainage noted.                           13.6   7.29  )-----------( 303      ( 19 Aug 2024 17:00 )             41.5       08-19    140  |  106  |  22  ----------------------------<  98  3.7   |  29  |  0.79    Ca    9.6      19 Aug 2024 17:00    TPro  7.4  /  Alb  3.7  /  TBili  0.4  /  DBili  x   /  AST  19  /  ALT  17  /  AlkPhos  87  08-19      PT/INR - ( 19 Aug 2024 17:00 )   PT: 10.8 sec;   INR: 0.95 ratio         PTT - ( 19 Aug 2024 17:00 )  PTT:29.2 sec      Culture - Tissue with Gram Stain (collected 20 Aug 2024 19:12)  Source: .Tissue Other, right foot tissue culture  Gram Stain (21 Aug 2024 02:39):    No polymorphonuclear cells seen per low power field    No organisms seen per oil power field    Culture - Blood (collected 19 Aug 2024 17:00)  Source: .Blood Blood-Peripheral  Preliminary Report (21 Aug 2024 01:02):    No growth at 24 hours    Culture - Blood (collected 19 Aug 2024 17:00)  Source: .Blood Blood-Peripheral  Preliminary Report (21 Aug 2024 01:02):    No growth at 24 hours
[facility]  INFECTIOUS DISEASES - Janice Iraheta, [place] [address]  Tel: [phone]     Fax: [phone]  =======================================================    FRANCOIS FIELD [id number]    Follow up: no fevers. Ankle pain controlled. Denies any new complaints.    Allergies:  No Known Allergies      Antibiotics:  aluminum hydroxide/magnesium hydroxide/simethicone Suspension 30 milliLiter(s) Oral every 4 hours PRN  ceFAZolin   IVPB 1000 milliGRAM(s) IV Intermittent every 8 hours  heparin   Injectable 5000 Unit(s) SubCutaneous every 12 hours  ketorolac   Injectable 15 milliGRAM(s) IV Push every 4 hours  levothyroxine 125 MICROGram(s) Oral daily  melatonin 3 milliGRAM(s) Oral at bedtime PRN  ondansetron Injectable 4 milliGRAM(s) IV Push once PRN  ondansetron Injectable 4 milliGRAM(s) IV Push every 8 hours PRN  oxyCODONE    IR 5 milliGRAM(s) Oral once PRN  potassium chloride    Tablet ER 10 milliEquivalent(s) Oral daily  traMADol 50 milliGRAM(s) Oral every 6 hours PRN       REVIEW OF SYSTEMS:  CONSTITUTIONAL:  No Fever or chills  HEENT:  No sore throat or runny nose.  CARDIOVASCULAR:  No chest pain or SOB.  RESPIRATORY:  No cough, shortness of breath  GASTROINTESTINAL:  No nausea, vomiting or diarrhea.  GENITOURINARY:  No dysuria, frequency or urgency  MUSCULOSKELETAL:  (+) R ankle pain  SKIN:  (+) R ankle wound  NEUROLOGIC:  No headache or dizziness  PSYCHIATRIC:  No disorder of thought or mood.     Physical Exam:  ICU Vital Signs Last 24 Hrs  T(C): 36.6 (21 Aug 2024 11:07), Max: 36.8 (20 Aug 2024 18:07)  T(F): 97.8 (21 Aug 2024 11:07), Max: 98.2 (20 Aug 2024 18:07)  HR: 59 (21 Aug 2024 11:07) (57 - 93)  BP: 131/86 (21 Aug 2024 11:07) (112/70 - 131/86)  BP(mean): --  ABP: --  ABP(mean): --  RR: 16 (21 Aug 2024 11:07) (14 - 18)  SpO2: 93% (21 Aug 2024 11:07) (92% - 100%)    O2 Parameters below as of 21 Aug 2024 11:07  Patient On (Oxygen Delivery Method): room air      GEN: NAD  HEENT: normocephalic and atraumatic.   NECK: Supple.   LUNGS: Normal respiratory effort  HEART: Regular rate and rhythm   ABDOMEN: Soft, nontender, and nondistended.    EXTREMITIES: RLE swelling, R ankle covered with dressing  NEUROLOGIC: grossly intact.  PSYCHIATRIC: Appropriate affect .    Labs:                  RECENT CULTURES:  08-20 @ 19:12 .Tissue Other, right foot tissue culture       No polymorphonuclear cells seen per low power field  No organisms seen per oil power field      08-19 @ 17:00 .Blood Blood-Peripheral     No growth at 24 hours              All imaging and data are reviewed.   
[facility]  INFECTIOUS DISEASES - Janice Iraheta, [place] [address]  Tel: [phone]     Fax: [phone]  =======================================================    FRANCOIS FIELD [id number]    Follow up: No fevers. Denies any new complaints.    Allergies:  No Known Allergies      Antibiotics:  aluminum hydroxide/magnesium hydroxide/simethicone Suspension 30 milliLiter(s) Oral every 4 hours PRN  ceFAZolin   IVPB 1000 milliGRAM(s) IV Intermittent every 8 hours  heparin   Injectable 5000 Unit(s) SubCutaneous every 8 hours  ketorolac   Injectable 15 milliGRAM(s) IV Push every 4 hours  levothyroxine 125 MICROGram(s) Oral daily  melatonin 3 milliGRAM(s) Oral at bedtime PRN  ondansetron Injectable 4 milliGRAM(s) IV Push every 8 hours PRN  traMADol 50 milliGRAM(s) Oral every 6 hours PRN  traMADol 25 milliGRAM(s) Oral every 4 hours PRN       REVIEW OF SYSTEMS:  CONSTITUTIONAL:  No Fever or chills  HEENT:  No sore throat or runny nose.  CARDIOVASCULAR:  No chest pain or SOB.  RESPIRATORY:  No cough, shortness of breath  GASTROINTESTINAL:  No nausea, vomiting or diarrhea.  GENITOURINARY:  No dysuria, frequency or urgency  MUSCULOSKELETAL:  (+) R ankle pain  SKIN:  (+) R ankle wound  NEUROLOGIC:  No headache or dizziness  PSYCHIATRIC:  No disorder of thought or mood.     Physical Exam:  ICU Vital Signs Last 24 Hrs  T(C): 36.7 (22 Aug 2024 11:08), Max: 36.8 (21 Aug 2024 20:58)  T(F): 98 (22 Aug 2024 11:08), Max: 98.2 (21 Aug 2024 20:58)  HR: 57 (22 Aug 2024 11:08) (57 - 66)  BP: 134/83 (22 Aug 2024 11:08) (116/71 - 134/89)  BP(mean): --  ABP: --  ABP(mean): --  RR: 18 (22 Aug 2024 11:08) (18 - 18)  SpO2: 93% (22 Aug 2024 11:08) (93% - 97%)    O2 Parameters below as of 22 Aug 2024 11:08  Patient On (Oxygen Delivery Method): room air           GEN: NAD  HEENT: normocephalic and atraumatic.   NECK: Supple.   LUNGS: Normal respiratory effort  HEART: Regular rate and rhythm   ABDOMEN: Soft, nontender, and nondistended.    EXTREMITIES: RLE swelling appears less, R ankle covered with dressing  NEUROLOGIC: grossly intact.  PSYCHIATRIC: Appropriate affect .    Labs:  08-22    142  |  107  |  22  ----------------------------<  81  4.5   |  32<H>  |  0.83    Ca    10.3<H>      22 Aug 2024 07:10                            13.7   6.04  )-----------( 290      ( 22 Aug 2024 07:10 )             42.3       Urinalysis Basic - ( 22 Aug 2024 07:10 )    Color: x / Appearance: x / SG: x / pH: x  Gluc: 81 mg/dL / Ketone: x  / Bili: x / Urobili: x   Blood: x / Protein: x / Nitrite: x   Leuk Esterase: x / RBC: x / WBC x   Sq Epi: x / Non Sq Epi: x / Bacteria: x          RECENT CULTURES:  08-20 @ 19:12 .Tissue Other, right foot tissue culture     No growth to date.    No polymorphonuclear cells seen per low power field  No organisms seen per oil power field      08-19 @ 17:00 .Blood Blood-Peripheral     No growth at 48 Hours              All imaging and data are reviewed.   
[facility] -- Andrew Campa,  Familia, Bhupendra Mcgee Savella, Goodger, Cohen  Office # [phone]    Follow Up:  cardiac optimization    Subjective/Observations: Pt seen and examined in bed, s/p wound r foot wound debridement , mild post op pain. Denied any chest pain, sob, palpitations. tolerating RA.     REVIEW OF SYSTEMS: All other review of systems is negative unless indicated above  PAST MEDICAL & SURGICAL HISTORY:  Prediabetes      Hypothyroidism      OA (osteoarthritis)      Polyp of corpus uteri      Postmenopausal bleeding      Localized swelling, mass and lump, unspecified lower limb      Abnormal EKG      Ankle mass      2019 novel coronavirus disease (COVID-19)      Bilateral ankle pain, unspecified chronicity      Finger pain      Pain in both feet      Primary osteoarthritis of both knees      Tinea unguium      Primary osteoarthritis of first carpometacarpal joint of right hand      Trochanteric bursitis of left hip      Asthma      Cervical polyp      Raynauds syndrome      ANISA (obstructive sleep apnea)      S/P  section  ()      Elective surgery  (Right big toe joint replacement, )      History of colonoscopy  (2018)      History of D&C      History of left hip replacement      H/O gastric sleeve        MEDICATIONS  (STANDING):  ceFAZolin   IVPB 1000 milliGRAM(s) IV Intermittent every 8 hours  heparin   Injectable 5000 Unit(s) SubCutaneous every 12 hours  HYDROmorphone  Injectable 0.5 milliGRAM(s) IV Push every 6 hours  lactated ringers. 1000 milliLiter(s) (75 mL/Hr) IV Continuous <Continuous>  levothyroxine 125 MICROGram(s) Oral daily  potassium chloride    Tablet ER 10 milliEquivalent(s) Oral daily    MEDICATIONS  (PRN):  aluminum hydroxide/magnesium hydroxide/simethicone Suspension 30 milliLiter(s) Oral every 4 hours PRN Dyspepsia  melatonin 3 milliGRAM(s) Oral at bedtime PRN Insomnia  ondansetron Injectable 4 milliGRAM(s) IV Push once PRN Nausea and/or Vomiting  ondansetron Injectable 4 milliGRAM(s) IV Push every 8 hours PRN Nausea and/or Vomiting  oxyCODONE    IR 5 milliGRAM(s) Oral once PRN Mild Pain (1 - 3)    Allergies    No Known Allergies    Intolerances      Vital Signs Last 24 Hrs  T(C): 36.6 (21 Aug 2024 11:07), Max: 36.8 (20 Aug 2024 18:07)  T(F): 97.8 (21 Aug 2024 11:07), Max: 98.2 (20 Aug 2024 18:07)  HR: 59 (21 Aug 2024 11:) (57 - 93)  BP: 131/86 (21 Aug 2024 11:07) (110/78 - 131/86)  BP(mean): --  RR: 16 (21 Aug 2024 11:) (14 - 18)  SpO2: 93% (21 Aug 2024 11:) (92% - 100%)    Parameters below as of 21 Aug 2024 11:07  Patient On (Oxygen Delivery Method): room air      I&O's Summary    20 Aug 2024 07:01  -  21 Aug 2024 07:00  --------------------------------------------------------  IN: 440 mL / OUT: 550 mL / NET: -110 mL    21 Aug 2024 07:01  -  21 Aug 2024 11:43  --------------------------------------------------------  IN: 0 mL / OUT: 800 mL / NET: -800 mL      Weight (kg): 99 (08-20 @ 18:07)    TELE: off  PHYSICAL EXAM:  Constitutional: NAD, awake and alert  HEENT: Moist Mucous Membranes, Anicteric  Pulmonary: Non-labored, breath sounds are clear bilaterally, No wheezing, rales or rhonchi  Cardiovascular: Regular, S1 and S2, No murmurs, rubs, gallops or clicks  Gastrointestinal: Bowel Sounds present, soft, nontender.   Lymph: No peripheral edema. No lymphadenopathy.  Skin: RLE dressing clean and intact   Psych:  Mood & affect appropriate    LABS: All Labs Reviewed:                        13.6   729  )-----------( 303      ( 19 Aug 2024 17:00 )             41.5     19 Aug 2024 17:00    140    |  106    |  22     ----------------------------<  98     3.7     |  29     |  0.79     Ca    9.6        19 Aug 2024 17:00    TPro  7.4    /  Alb  3.7    /  TBili  0.4    /  DBili  x      /  AST       /  ALT  17     /  AlkPhos  87     19 Aug 2024 17:00    PT/INR - ( 19 Aug 2024 17:00 )   PT: 10.8 sec;   INR: 0.95 ratio         PTT - ( 19 Aug 2024 17:00 )  PTT:29.2 sec      12 Lead ECG:   Ventricular Rate 57 BPM    Atrial Rate 57 BPM    P-R Interval 224 ms    QRS Duration 80 ms    Q-T Interval 422 ms    QTC Calculation(Bazett) 410 ms    P Axis 49 degrees    R Axis -15 degrees    T Axis 72 degrees    Diagnosis Line Sinus bradycardia with 1st degree AV block  Septal infarct (cited on or before 2024)  Confirmed by ABY MCGEE ([id number]) on 2024 7:39:03 AM (24 @ 19:32)       
  PROGRESS NOTE   Patient is a 59y old  Female who presents with a chief complaint of Right ankle open wound (23 Aug 2024 13:23)      HPI:  Chart, labs and reports reviewed.  FRANCOIS FIELD is a 59-year-old female who presents to the emergency room with a right ankle wound.  Past medical history of prediabetes, hypothyroidism, osteoarthritis, trochanteric bursitis, asthma, Raynaud's,  obstructive sleep apnea, status post right lateral ankle lipoma excision and subsequently formed a large hematoma at the surgical site.  She has been following with the wound care center but worsening of the hematoma and superficial skin necrosis.  She was advised to come to the emergency room for further workup and admission.  Reports that the procedure was 3 weeks ago and initially after she was started on Keflex she had worsening symptoms and then was started on Augmentin which she completed yesterday.  Denies any fevers chills nausea vomiting chest pain or shortness of breath.  Does endorse significant pain at the site.   (19 Aug 2024 22:22)      Vital Signs Last 24 Hrs  T(C): 36.6 (23 Aug 2024 12:16), Max: 36.7 (22 Aug 2024 20:13)  T(F): 97.8 (23 Aug 2024 12:16), Max: 98.1 (22 Aug 2024 20:13)  HR: 70 (23 Aug 2024 12:16) (62 - 70)  BP: 115/72 (23 Aug 2024 12:16) (110/70 - 134/81)  BP(mean): --  RR: 18 (23 Aug 2024 12:16) (18 - 18)  SpO2: 94% (23 Aug 2024 12:16) (93% - 96%)    Parameters below as of 23 Aug 2024 12:16  Patient On (Oxygen Delivery Method): room air                              13.7   6.04  )-----------( 290      ( 22 Aug 2024 07:10 )             42.3               08-22    142  |  107  |  22  ----------------------------<  81  4.5   |  32<H>  |  0.83    Ca    10.3<H>      22 Aug 2024 07:10        PHYSICAL EXAM  GEN: FRANCOIS FIELD is a pleasant well-nourished, well developed 59y Female in no acute distress, alert awake, and oriented to person, place and time.   LE Focused:  Vasc:  DP/PT pulses palpable B/L.                         Derm: Wound vac noted to right lateral ankle. Wound bed shows granular wound bed with marked improvement.                          Neuro: Protective sensation intact B/L.                         MSK: 5/5 muscle strength to all compartment b/l.     
[facility] -- Familia Morales, Bhupendra Mcgee Savella, Goodger, Cohen: Office # [phone]    Follow Up:  cardiac optimization    Subjective/Observations: Patient seen and examined. Patient awake, alert, resting in bed. No complaints of chest pain, dyspnea, palpitations or dizziness. No signs of orthopnea or PND. Tolerating room air.    REVIEW OF SYSTEMS: All other review of systems are negative unless indicated above    PAST MEDICAL & SURGICAL HISTORY:  Prediabetes      Hypothyroidism      Hypothyroidism      OA (osteoarthritis)      Polyp of corpus uteri      Postmenopausal bleeding      Localized swelling, mass and lump, unspecified lower limb      Abnormal EKG      Ankle mass      2019 novel coronavirus disease (COVID-19)      Bilateral ankle pain, unspecified chronicity      Finger pain      Pain in both feet      Primary osteoarthritis of both knees      Tinea unguium      Primary osteoarthritis of first carpometacarpal joint of right hand      Trochanteric bursitis of left hip      Asthma      Cervical polyp      Raynauds syndrome      ANISA (obstructive sleep apnea)      S/P  section  ()      Elective surgery  (Right big toe joint replacement, )      History of colonoscopy  (2018)      History of D&C      History of left hip replacement      H/O gastric sleeve      MEDICATIONS  (STANDING):  ceFAZolin   IVPB 1000 milliGRAM(s) IV Intermittent every 8 hours  heparin   Injectable 5000 Unit(s) SubCutaneous every 8 hours  ketorolac   Injectable 15 milliGRAM(s) IV Push every 4 hours  levothyroxine 125 MICROGram(s) Oral daily  nystatin Powder 1 Application(s) Topical two times a day    MEDICATIONS  (PRN):  aluminum hydroxide/magnesium hydroxide/simethicone Suspension 30 milliLiter(s) Oral every 4 hours PRN Dyspepsia  melatonin 3 milliGRAM(s) Oral at bedtime PRN Insomnia  ondansetron Injectable 4 milliGRAM(s) IV Push every 8 hours PRN Nausea and/or Vomiting  oxyCODONE    IR 2.5 milliGRAM(s) Oral every 4 hours PRN Severe Pain (7 - 10)  traMADol 25 milliGRAM(s) Oral every 4 hours PRN Mild Pain (1 - 3)  traMADol 50 milliGRAM(s) Oral every 6 hours PRN Moderate Pain (4 - 6)    Allergies    No Known Allergies    Intolerances      Vital Signs Last 24 Hrs  T(C): 36.6 (23 Aug 2024 04:49), Max: 36.7 (22 Aug 2024 11:08)  T(F): 97.8 (23 Aug 2024 04:49), Max: 98.1 (22 Aug 2024 20:13)  HR: 62 (23 Aug 2024 04:49) (57 - 67)  BP: 134/81 (23 Aug 2024 04:49) (110/70 - 134/83)  BP(mean): --  RR: 18 (23 Aug 2024 04:49) (18 - 18)  SpO2: 96% (23 Aug 2024 04:49) (93% - 96%)    Parameters below as of 23 Aug 2024 04:49  Patient On (Oxygen Delivery Method): room air      I&O's Summary    22 Aug 2024 07:01  -  23 Aug 2024 07:00  --------------------------------------------------------  IN: 0 mL / OUT: 1200 mL / NET: -1200 mL    23 Aug 2024 07:01  -  23 Aug 2024 10:54  --------------------------------------------------------  IN: 0 mL / OUT: 400 mL / NET: -400 mL        TELE: Not on telemetry   PHYSICAL EXAM:  Constitutional: NAD, awake and alert  HEENT: Moist Mucous Membranes, Anicteric  Pulmonary: Non-labored, breath sounds are clear bilaterally, No wheezing, rales or rhonchi  Cardiovascular: Regular, S1 and S2, No murmurs, No rubs, gallops or clicks  Gastrointestinal:  soft, nontender, nondistended   Lymph: No peripheral edema. No lymphadenopathy.   Skin: No visible rashes Right foot vac DSD intact.   Psych:  Mood & affect appropriate    LABS: All Labs Reviewed:                        13.7   6.04  )-----------( 290      ( 22 Aug 2024 07:10 )             42.3     22 Aug 2024 07:10    142    |  107    |  22     ----------------------------<  81     4.5     |  32     |  0.83     Ca    10.3       22 Aug 2024 07:10         12 Lead ECG:   Ventricular Rate 57 BPM    Atrial Rate 57 BPM    P-R Interval 224 ms    QRS Duration 80 ms    Q-T Interval 422 ms    QTC Calculation(Bazett) 410 ms    P Axis 49 degrees    R Axis -15 degrees    T Axis 72 degrees    Diagnosis Line Sinus bradycardia with 1st degree AV block  Septal infarct (cited on or before 2024)  Confirmed by ABY MCGEE ([id number]) on 2024 7:39:03 AM (24 @ 19:32)    
[facility] -- Familia Morales Pannella, Patel, Savella, Goodger, Cohen: Office # [phone]    Follow Up:  cardiac optimization    Subjective/Observations: Patient seen and examined. Patient awake, alert, resting in bed. No complaints of chest pain, dyspnea, palpitations or dizziness. No signs of orthopnea or PND. Tolerating room air.    REVIEW OF SYSTEMS: All other review of systems are negative unless indicated above    PAST MEDICAL & SURGICAL HISTORY:  Prediabetes      Hypothyroidism      Hypothyroidism      OA (osteoarthritis)      Polyp of corpus uteri      Postmenopausal bleeding      Localized swelling, mass and lump, unspecified lower limb      Abnormal EKG      Ankle mass      2019 novel coronavirus disease (COVID-19)      Bilateral ankle pain, unspecified chronicity      Finger pain      Pain in both feet      Primary osteoarthritis of both knees      Tinea unguium      Primary osteoarthritis of first carpometacarpal joint of right hand      Trochanteric bursitis of left hip      Asthma      Cervical polyp      Raynauds syndrome      ANISA (obstructive sleep apnea)      S/P  section  ()      Elective surgery  (Right big toe joint replacement, )      History of colonoscopy  (2018)      History of D&C      History of left hip replacement      H/O gastric sleeve    MEDICATIONS  (STANDING):  ceFAZolin   IVPB 1000 milliGRAM(s) IV Intermittent every 8 hours  heparin   Injectable 5000 Unit(s) SubCutaneous every 12 hours  ketorolac   Injectable 15 milliGRAM(s) IV Push every 4 hours  levothyroxine 125 MICROGram(s) Oral daily  potassium chloride    Tablet ER 10 milliEquivalent(s) Oral daily    MEDICATIONS  (PRN):  aluminum hydroxide/magnesium hydroxide/simethicone Suspension 30 milliLiter(s) Oral every 4 hours PRN Dyspepsia  HYDROmorphone  Injectable 0.5 milliGRAM(s) IV Push every 6 hours PRN Severe Pain (7 - 10)  melatonin 3 milliGRAM(s) Oral at bedtime PRN Insomnia  ondansetron Injectable 4 milliGRAM(s) IV Push once PRN Nausea and/or Vomiting  ondansetron Injectable 4 milliGRAM(s) IV Push every 8 hours PRN Nausea and/or Vomiting  oxyCODONE    IR 5 milliGRAM(s) Oral once PRN Mild Pain (1 - 3)  traMADol 50 milliGRAM(s) Oral every 6 hours PRN Moderate Pain (4 - 6)    Allergies  No Known Allergies    Vital Signs Last 24 Hrs  T(C): 36.7 (22 Aug 2024 11:08), Max: 36.8 (21 Aug 2024 20:58)  T(F): 98 (22 Aug 2024 11:08), Max: 98.2 (21 Aug 2024 20:58)  HR: 57 (22 Aug 2024 11:08) (57 - 66)  BP: 134/83 (22 Aug 2024 11:08) (116/71 - 134/89)  BP(mean): --  RR: 18 (22 Aug 2024 11:08) (18 - 18)  SpO2: 93% (22 Aug 2024 11:08) (93% - 97%)    Parameters below as of 22 Aug 2024 11:08  Patient On (Oxygen Delivery Method): room air      I&O's Summary    21 Aug 2024 07:01  -  22 Aug 2024 07:00  --------------------------------------------------------  IN: 0 mL / OUT: 2500 mL / NET: -2500 mL    22 Aug 2024 07:01  -  22 Aug 2024 12:22  --------------------------------------------------------  IN: 0 mL / OUT: 400 mL / NET: -400 mL    TELE: Not on telemetry   PHYSICAL EXAM:  Constitutional: NAD, awake and alert  HEENT: Moist Mucous Membranes, Anicteric  Pulmonary: Non-labored, breath sounds are clear bilaterally, No wheezing, rales or rhonchi  Cardiovascular: Regular, S1 and S2, No murmurs, No rubs, gallops or clicks  Gastrointestinal:  soft, nontender, nondistended   Lymph: No peripheral edema. No lymphadenopathy.   Skin: No visible rashes Right foot vac DSD intact.   Psych:  Mood & affect appropriate      LABS: All Labs Reviewed:                        13.7   6.04  )-----------( 290      ( 22 Aug 2024 07:10 )             42.3                         13.6   7.29  )-----------( 303      ( 19 Aug 2024 17:00 )             41.5     22 Aug 2024 07:10    142    |  107    |  22     ----------------------------<  81     4.5     |  32     |  0.83   19 Aug 2024 17:00    140    |  106    |  22     ----------------------------<  98     3.7     |  29     |  0.79     Ca    10.3       22 Aug 2024 07:10  Ca    9.6        19 Aug 2024 17:00    TPro  7.4    /  Alb  3.7    /  TBili  0.4    /  DBili  x      /  AST       /  ALT  17     /  AlkPhos  87     19 Aug 2024 17:00     Lactate, Blood: 1.0 mmol/L (24 @ 17:00)    12 Lead ECG:   Ventricular Rate 57 BPM    Atrial Rate 57 BPM    P-R Interval 224 ms    QRS Duration 80 ms    Q-T Interval 422 ms    QTC Calculation(Bazett) 410 ms    P Axis 49 degrees    R Axis -15 degrees    T Axis 72 degrees    Diagnosis Line Sinus bradycardia with 1st degree AV block  Septal infarct (cited on or before 2024)  Confirmed by ABY HAND ([id number]) on 2024 7:39:03 AM (24 @ 19:32)    
[facility]  INFECTIOUS DISEASES - [address], [place] [address]  Tel: [phone]     Fax: [phone]  =======================================================    FRANCOIS FIELD [id number]    Follow up: No fevers. Denies any new complaints. Wound examined with Podiatry resident.    Allergies:  No Known Allergies      Antibiotics:  aluminum hydroxide/magnesium hydroxide/simethicone Suspension 30 milliLiter(s) Oral every 4 hours PRN  ascorbic acid 500 milliGRAM(s) Oral daily  calcium carbonate 1250 mG  + Vitamin D (OsCal 500 + D) 1 Tablet(s) Oral daily  cefTRIAXone   IVPB 2000 milliGRAM(s) IV Intermittent every 24 hours  cholecalciferol 1000 Unit(s) Oral daily  heparin   Injectable 5000 Unit(s) SubCutaneous every 8 hours  levothyroxine 125 MICROGram(s) Oral daily  melatonin 3 milliGRAM(s) Oral at bedtime PRN  multivitamin 1 Tablet(s) Oral daily  nystatin Powder 1 Application(s) Topical two times a day  oxyCODONE    IR 2.5 milliGRAM(s) Oral every 4 hours PRN  traMADol 50 milliGRAM(s) Oral every 6 hours PRN  traMADol 25 milliGRAM(s) Oral every 4 hours PRN       REVIEW OF SYSTEMS:  CONSTITUTIONAL:  No Fever or chills  HEENT:  No sore throat or runny nose.  CARDIOVASCULAR:  No chest pain or SOB.  RESPIRATORY:  No cough, shortness of breath  GASTROINTESTINAL:  No nausea, vomiting or diarrhea.  GENITOURINARY:  No dysuria, frequency or urgency  MUSCULOSKELETAL:  (+) R ankle pain  SKIN:  (+) R ankle wound  NEUROLOGIC:  No headache or dizziness  PSYCHIATRIC:  No disorder of thought or mood.     Physical Exam:  ICU Vital Signs Last 24 Hrs  T(C): 36.6 (23 Aug 2024 12:16), Max: 36.7 (22 Aug 2024 20:13)  T(F): 97.8 (23 Aug 2024 12:16), Max: 98.1 (22 Aug 2024 20:13)  HR: 70 (23 Aug 2024 12:16) (62 - 70)  BP: 115/72 (23 Aug 2024 12:16) (110/70 - 134/81)  BP(mean): --  ABP: --  ABP(mean): --  RR: 18 (23 Aug 2024 12:16) (18 - 18)  SpO2: 94% (23 Aug 2024 12:16) (93% - 96%)    O2 Parameters below as of 23 Aug 2024 12:16  Patient On (Oxygen Delivery Method): room air          GEN: NAD  HEENT: normocephalic and atraumatic.   NECK: Supple.   LUNGS: Normal respiratory effort  HEART: Regular rate and rhythm   ABDOMEN: Soft, nontender, and nondistended.    EXTREMITIES: RLE swelling appears less  NEUROLOGIC: grossly intact.  PSYCHIATRIC: Appropriate affect .  SKIN: R lateral ankle wound, no drainage or odor, no periwound erythema    Labs:  08-22    142  |  107  |  22  ----------------------------<  81  4.5   |  32<H>  |  0.83    Ca    10.3<H>      22 Aug 2024 07:10                            13.7   6.04  )-----------( 290      ( 22 Aug 2024 07:10 )             42.3       Urinalysis Basic - ( 22 Aug 2024 07:10 )    Color: x / Appearance: x / SG: x / pH: x  Gluc: 81 mg/dL / Ketone: x  / Bili: x / Urobili: x   Blood: x / Protein: x / Nitrite: x   Leuk Esterase: x / RBC: x / WBC x   Sq Epi: x / Non Sq Epi: x / Bacteria: x          RECENT CULTURES:  08-20 @ 19:12 .Tissue Other, right foot tissue culture     No growth to date.    No polymorphonuclear cells seen per low power field  No organisms seen per oil power field      08-19 @ 17:00 .Blood Blood-Peripheral     No growth at 72 Hours              All imaging and data are reviewed.     
  PROGRESS NOTE   Patient is a 59y old  Female who presents with a chief complaint of Right ankle open wound (22 Aug 2024 12:21)      HPI:  Chart, labs and reports reviewed.  FRANCOIS FIELD is a 59-year-old female who presents to the emergency room with a right ankle wound.  Past medical history of prediabetes, hypothyroidism, osteoarthritis, trochanteric bursitis, asthma, Raynaud's,  obstructive sleep apnea, status post right lateral ankle lipoma excision and subsequently formed a large hematoma at the surgical site.  She has been following with the wound care center but worsening of the hematoma and superficial skin necrosis.  She was advised to come to the emergency room for further workup and admission.  Reports that the procedure was 3 weeks ago and initially after she was started on Keflex she had worsening symptoms and then was started on Augmentin which she completed yesterday.  Denies any fevers chills nausea vomiting chest pain or shortness of breath.  Does endorse significant pain at the site.   (19 Aug 2024 22:22)      Vital Signs Last 24 Hrs  T(C): 36.7 (22 Aug 2024 11:08), Max: 36.8 (21 Aug 2024 20:58)  T(F): 98 (22 Aug 2024 11:08), Max: 98.2 (21 Aug 2024 20:58)  HR: 57 (22 Aug 2024 11:08) (57 - 66)  BP: 134/83 (22 Aug 2024 11:08) (116/71 - 134/89)  BP(mean): --  RR: 18 (22 Aug 2024 11:08) (18 - 18)  SpO2: 93% (22 Aug 2024 11:08) (93% - 97%)    Parameters below as of 22 Aug 2024 11:08  Patient On (Oxygen Delivery Method): room air                              13.7   6.04  )-----------( 290      ( 22 Aug 2024 07:10 )             42.3               08-22    142  |  107  |  22  ----------------------------<  81  4.5   |  32<H>  |  0.83    Ca    10.3<H>      22 Aug 2024 07:10        PHYSICAL EXAM  GEN: FRANCOIS FIELD is a pleasant well-nourished, well developed 59y Female in no acute distress, alert awake, and oriented to person, place and time.   LE Focused:  Vasc:  DP/PT pulses palpable B/L.                         Derm: Wound vac noted to right lateral ankle. Wound bed shows granular wound bed with marked improvement.                          Neuro: Protective sensation intact B/L.                         MSK: 5/5 muscle strength to all compartment b/l.   
Patient is a 59y old  Female who presents with a chief complaint of Unspecified open wound, right ankle, initial encounter  s/p OR debridement yesterday  complains of pain at site   (21 Aug 2024 13:01)      INTERVAL HPI/OVERNIGHT EVENTS:  T(C): 36.6 (08-21-24 @ 11:07), Max: 36.8 (08-20-24 @ 18:07)  HR: 59 (08-21-24 @ 11:07) (57 - 93)  BP: 131/86 (08-21-24 @ 11:07) (110/78 - 131/86)  RR: 16 (08-21-24 @ 11:07) (14 - 18)  SpO2: 93% (08-21-24 @ 11:07) (92% - 100%)  Wt(kg): --  I&O's Summary    20 Aug 2024 07:01  -  21 Aug 2024 07:00  --------------------------------------------------------  IN: 440 mL / OUT: 550 mL / NET: -110 mL    21 Aug 2024 07:01  -  21 Aug 2024 13:42  --------------------------------------------------------  IN: 0 mL / OUT: 800 mL / NET: -800 mL        LABS:                        13.6   7.29  )-----------( 303      ( 19 Aug 2024 17:00 )             41.5     08-19    140  |  106  |  22  ----------------------------<  98  3.7   |  29  |  0.79    Ca    9.6      19 Aug 2024 17:00    TPro  7.4  /  Alb  3.7  /  TBili  0.4  /  DBili  x   /  AST  19  /  ALT  17  /  AlkPhos  87  08-19    PT/INR - ( 19 Aug 2024 17:00 )   PT: 10.8 sec;   INR: 0.95 ratio         PTT - ( 19 Aug 2024 17:00 )  PTT:29.2 sec  Urinalysis Basic - ( 19 Aug 2024 17:00 )    Color: x / Appearance: x / SG: x / pH: x  Gluc: 98 mg/dL / Ketone: x  / Bili: x / Urobili: x   Blood: x / Protein: x / Nitrite: x   Leuk Esterase: x / RBC: x / WBC x   Sq Epi: x / Non Sq Epi: x / Bacteria: x      CAPILLARY BLOOD GLUCOSE            Urinalysis Basic - ( 19 Aug 2024 17:00 )    Color: x / Appearance: x / SG: x / pH: x  Gluc: 98 mg/dL / Ketone: x  / Bili: x / Urobili: x   Blood: x / Protein: x / Nitrite: x   Leuk Esterase: x / RBC: x / WBC x   Sq Epi: x / Non Sq Epi: x / Bacteria: x        MEDICATIONS  (STANDING):  ceFAZolin   IVPB 1000 milliGRAM(s) IV Intermittent every 8 hours  heparin   Injectable 5000 Unit(s) SubCutaneous every 12 hours  lactated ringers. 1000 milliLiter(s) (75 mL/Hr) IV Continuous <Continuous>  levothyroxine 125 MICROGram(s) Oral daily  potassium chloride    Tablet ER 10 milliEquivalent(s) Oral daily    MEDICATIONS  (PRN):  aluminum hydroxide/magnesium hydroxide/simethicone Suspension 30 milliLiter(s) Oral every 4 hours PRN Dyspepsia  HYDROmorphone  Injectable 0.5 milliGRAM(s) IV Push every 4 hours PRN Severe Pain (7 - 10)  melatonin 3 milliGRAM(s) Oral at bedtime PRN Insomnia  ondansetron Injectable 4 milliGRAM(s) IV Push once PRN Nausea and/or Vomiting  ondansetron Injectable 4 milliGRAM(s) IV Push every 8 hours PRN Nausea and/or Vomiting  oxyCODONE    IR 5 milliGRAM(s) Oral once PRN Mild Pain (1 - 3)  traMADol 50 milliGRAM(s) Oral every 6 hours PRN Moderate Pain (4 - 6)      REVIEW OF SYSTEMS:  CONSTITUTIONAL: No fever, weight loss, or fatigue  EYES: No eye pain, visual disturbances, or discharge  ENMT:  No difficulty hearing, tinnitus, vertigo; No sinus or throat pain  NECK: No pain or stiffness  RESPIRATORY: No cough, wheezing, chills or hemoptysis; No shortness of breath  CARDIOVASCULAR: No chest pain, palpitations, dizziness, or leg swelling  GASTROINTESTINAL: No abdominal or epigastric pain. No nausea, vomiting, or hematemesis; No diarrhea or constipation. No melena or hematochezia.  GENITOURINARY: No dysuria, frequency, hematuria, or incontinence  NEUROLOGICAL: No headaches, memory loss, loss of strength, numbness, or tremors  SKIN: No itching, burning, rashes, or lesions   LYMPH NODES: No enlarged glands  ENDOCRINE: No heat or cold intolerance; No hair loss  MUSCULOSKELETAL: No joint pain or swelling; No muscle, back, or extremity pain  PSYCHIATRIC: No depression, anxiety, mood swings, or difficulty sleeping  HEME/LYMPH: No easy bruising, or bleeding gums  ALLERY AND IMMUNOLOGIC: No hives or eczema    RADIOLOGY & ADDITIONAL TESTS:    Imaging Personally Reviewed:  [ x] YES  [ ] NO    Consultant(s) Notes Reviewed:  [ x] YES  [ ] NO    PHYSICAL EXAM:  GENERAL: NAD, well-groomed, well-developed  HEAD:  Atraumatic, Normocephalic  EYES: EOMI, PERRLA, conjunctiva and sclera clear  ENMT: No tonsillar erythema, exudates, or enlargement; Moist mucous membranes, Good dentition, No lesions  NECK: Supple, No JVD, Normal thyroid  NERVOUS SYSTEM:  Alert & Oriented X3, Good concentration; Motor Strength 5/5 B/L upper and lower extremities; DTRs 2+ intact and symmetric  CHEST/LUNG: Clear to percussion bilaterally; No rales, rhonchi, wheezing, or rubs  HEART: Regular rate and rhythm; No murmurs, rubs, or gallops  ABDOMEN: Soft, Nontender, Nondistended; Bowel sounds present  EXTREMITIES:  2+ Peripheral Pulses, No clubbing, cyanosis, or edema  LYMPH: No lymphadenopathy noted  SKIN: No rashes or lesions    Care Discussed with Consultants/Other Providers [ x] YES  [ ] NO    advance care planning and advance directives discussed, including but not limited to long term care planning, and all forms reviewed [x]YES  [ ]NO  family caregiver training provided [x]YES  [ ]NO  time spent greater than 45min
Date of Service 08-22-24 @ 14:37    Patient is a 59y old  Female who presents with a chief complaint of Right ankle open wound (22 Aug 2024 13:49)      INTERVAL /OVERNIGHT EVENTS: pain better    MEDICATIONS  (STANDING):  ceFAZolin   IVPB 1000 milliGRAM(s) IV Intermittent every 8 hours  heparin   Injectable 5000 Unit(s) SubCutaneous every 8 hours  ketorolac   Injectable 15 milliGRAM(s) IV Push every 4 hours  levothyroxine 125 MICROGram(s) Oral daily    MEDICATIONS  (PRN):  aluminum hydroxide/magnesium hydroxide/simethicone Suspension 30 milliLiter(s) Oral every 4 hours PRN Dyspepsia  melatonin 3 milliGRAM(s) Oral at bedtime PRN Insomnia  ondansetron Injectable 4 milliGRAM(s) IV Push every 8 hours PRN Nausea and/or Vomiting  traMADol 50 milliGRAM(s) Oral every 6 hours PRN Moderate Pain (4 - 6)      Allergies    No Known Allergies    Intolerances        REVIEW OF SYSTEMS:  CONSTITUTIONAL: No fever, weight loss, or fatigue  EYES: No eye pain, visual disturbances, or discharge  ENMT:  No difficulty hearing, tinnitus, vertigo; No sinus or throat pain  NECK: No pain or stiffness  RESPIRATORY: No cough, wheezing, chills or hemoptysis; No shortness of breath  CARDIOVASCULAR: No chest pain, palpitations, dizziness, or leg swelling  GASTROINTESTINAL: No abdominal or epigastric pain. No nausea, vomiting, or hematemesis; No diarrhea or constipation. No melena or hematochezia.  GENITOURINARY: No dysuria, frequency, hematuria, or incontinence  NEUROLOGICAL: No headaches, memory loss, loss of strength, numbness, or tremors  SKIN: + lesion  LYMPH NODES: No enlarged glands  ENDOCRINE: No heat or cold intolerance; No hair loss; No polydipsia or polyuria  MUSCULOSKELETAL: No joint pain or swelling; No muscle, back, or extremity pain  PSYCHIATRIC: No depression, anxiety, mood swings, or difficulty sleeping  HEME/LYMPH: No easy bruising, or bleeding gums  ALLERGY AND IMMUNOLOGIC: No hives or eczema    Vital Signs Last 24 Hrs  T(C): 36.7 (22 Aug 2024 11:08), Max: 36.8 (21 Aug 2024 20:58)  T(F): 98 (22 Aug 2024 11:08), Max: 98.2 (21 Aug 2024 20:58)  HR: 57 (22 Aug 2024 11:08) (57 - 66)  BP: 134/83 (22 Aug 2024 11:08) (116/71 - 134/89)  BP(mean): --  RR: 18 (22 Aug 2024 11:08) (18 - 18)  SpO2: 93% (22 Aug 2024 11:08) (93% - 97%)    Parameters below as of 22 Aug 2024 11:08  Patient On (Oxygen Delivery Method): room air        PHYSICAL EXAM:  GENERAL: NAD, well-groomed, well-developed  HEAD:  Atraumatic, Normocephalic  EYES: EOMI, PERRLA, conjunctiva and sclera clear  ENMT: No tonsillar erythema, exudates, or enlargement; Moist mucous membranes, Good dentition, No lesions  NECK: Supple, No JVD, Normal thyroid  NERVOUS SYSTEM:  Alert & Oriented X3, Good concentration; Motor Strength 5/5 B/L upper and lower extremities; DTRs 2+ intact and symmetric  CHEST/LUNG: Clear to auscultation bilaterally; No rales, rhonchi, wheezing, or rubs  HEART: Regular rate and rhythm; No murmurs, rubs, or gallops  ABDOMEN: Soft, Nontender, Nondistended; Bowel sounds present  EXTREMITIES:  2+ Peripheral Pulses, No clubbing, cyanosis, or edema  LYMPH: No lymphadenopathy noted  SKIN: RLE skin defect    LABS:                        13.7   6.04  )-----------( 290      ( 22 Aug 2024 07:10 )             42.3     22 Aug 2024 07:10    142    |  107    |  22     ----------------------------<  81     4.5     |  32     |  0.83     Ca    10.3       22 Aug 2024 07:10        Urinalysis Basic - ( 22 Aug 2024 07:10 )    Color: x / Appearance: x / SG: x / pH: x  Gluc: 81 mg/dL / Ketone: x  / Bili: x / Urobili: x   Blood: x / Protein: x / Nitrite: x   Leuk Esterase: x / RBC: x / WBC x   Sq Epi: x / Non Sq Epi: x / Bacteria: x      CAPILLARY BLOOD GLUCOSE          RADIOLOGY & ADDITIONAL TESTS:    Notes Reviewed:  [x ] YES  [ ] NO    Care Discussed with Consultants/Other Providers [x ] YES  [ ] NO

## 2024-08-23 NOTE — PROGRESS NOTE ADULT - REASON FOR ADMISSION
Right ankle open wound

## 2024-08-23 NOTE — PROGRESS NOTE ADULT - ASSESSMENT
59-year-old female with DM, who presents to the emergency room with a right ankle wound. Status post right lateral ankle lipoma excision on 7/30/24, and subsequently formed a large hematoma at the surgical site.  She has been following with the wound care center but worsening of the hematoma and superficial skin necrosis. She has been on a course of Keflex and Augmentin since the surgery.      S/p debridement today and wound vac placement on 8/20. Tissue cultures currently no growth. Path showed fbrinoinflammatory debris. Otherwise no fevers. Baseline CRP <3. Blood cultures remain no growth.    #R ankle wound    -continue cefazolin pending OR cultures  -suggest midline placement  -Plastic Surgery evaluation as outpatient  -discussed with daughter at bedside  -discussed with Dr. Eddie Lugo MD  Division of Infectious Diseases   Cell [phone] between 8am and 6pm   After 6pm and weekends please call ID service at [phone].     35 minutes spent on total encounter assessing patient, examination, chart review, counseling and coordinating care by the attending physician/nurse/care manager.       
60 yo F with PMHx of prediabetes, hypothyroidism, osteoarthritis, trochanteric bursitis, asthma, Raynaud's,  obstructive sleep apnea, admitted for right leg wound debridement, cardiology consulted for cardiac clearance.    cardiac optimization  - r foot wound debridement 8/20, tolerated procedure well from cv standpoint  - EKG Sinus bradycardia with 1st degree AV block, unchanged from previous  - no evidence of active ischemic heart disease, decompensated heart failure, severe obstructive valvular disease, or uncontrolled arrhythmia.   - Monitor and replete lytes, keep K>4, Mg>2.  - Will continue to follow.    Keke Lucas NP  Nurse Practitioner- Cardiology   Call TEAMS
59-year-old female with DM, who presents to the emergency room with a right ankle wound. Status post right lateral ankle lipoma excision on 7/30/24, and subsequently formed a large hematoma at the surgical site.  She has been following with the wound care center but worsening of the hematoma and superficial skin necrosis. She has been on a course of Keflex and Augmentin since the surgery.      S/p debridement and wound vac placement on 8/20. Tissue cultures remain no growth. Path showed fbrinoinflammatory debris. Otherwise no fevers. Baseline CRP <3. Blood cultures remain no growth. Midline placed yesterday, plan for course of IV antibiotics to help with wound healing.    #R ankle wound    -suggest ceftriaxone 2g IV q24h until 9/3/24  -CBC, CMP, CRP next week  -follow cultures to completion  -Plastic Surgery evaluation as outpatient  -discussed with Dr. Eddie Lugo MD  Division of Infectious Diseases   Cell [phone] between 8am and 6pm   After 6pm and weekends please call ID service at [phone].     35 minutes spent on total encounter assessing patient, examination, chart review, counseling and coordinating care by the attending physician/nurse/care manager.     
60 yo F with PMHx of prediabetes, hypothyroidism, osteoarthritis, trochanteric bursitis, asthma, Raynaud's,  obstructive sleep apnea, admitted for right leg wound debridement, cardiology consulted for cardiac clearance.    cardiac optimization  - S/p r foot wound debridement 8/20  - Tolerated procedure well, cardiac status optimal post operatively     - EKG Sinus bradycardia with 1st degree AV block, unchanged from previous  - No evidence of any active ischemia     - No evidence of any meaningful volume overload    - BP stable and controlled     - Monitor and replete lytes, keep K>4, Mg>2.  - Will continue to follow.    Alexi Castillo, MS FNP, AGACNP  Nurse Practitioner- Cardiology   Please call on TEAMS  
60 yo F with PMHx of prediabetes, hypothyroidism, osteoarthritis, trochanteric bursitis, asthma, Raynaud's,  obstructive sleep apnea, admitted for right leg wound debridement, cardiology consulted for cardiac clearance.    cardiac optimization  - S/p r foot wound debridement 8/20  - Tolerated procedure well, cardiac status optimal post operatively     - EKG Sinus bradycardia with 1st degree AV block, unchanged from previous  - No evidence of any active ischemia     - No evidence of any meaningful volume overload    - BP stable and controlled     - Monitor and replete lytes, keep K>4, Mg>2.  - Will continue to follow.    Alexi Castillo, MS FNP, AGACNP  Nurse Practitioner- Cardiology   Please call on TEAMS  
59-year-old female with DM, who presents to the emergency room with a right ankle wound. Status post right lateral ankle lipoma excision on 7/30/24, and subsequently formed a large hematoma at the surgical site.  She has been following with the wound care center but worsening of the hematoma and superficial skin necrosis. She has been on a course of Keflex and Augmentin since the surgery.      S/p debridement today and wound vac placement on 8/20. Otherwise no fevers. Baseline CRP <3. Blood cultures currently no growth.    #R ankle wound    -continue cefazolin pending OR cultures  -might benefit with longer course of IV antibiotics, patient agreeable  -discussed with Dr. Perlman and Dr. Aleksandr Lugo MD  Division of Infectious Diseases   Cell [phone] between 8am and 6pm   After 6pm and weekends please call ID service at [phone].     35 minutes spent on total encounter assessing patient, examination, chart review, counseling and coordinating care by the attending physician/nurse/care manager.     
Right lateral ankle wound
Right lateral ankle wound  
s/p right ankle wound debridement with wound vac application

## 2024-08-23 NOTE — DISCHARGE NOTE PROVIDER - NSDCMRMEDTOKEN_GEN_ALL_CORE_FT
ascorbic acid: 1 tab(s) orally once a day  Biotin: 1 tab(s) orally once a day  calcium-vitamin D: 1 tab(s) orally once a day  cefTRIAXone 2 g/50 mL-iso-osmotic dextrose intravenous solution: 2 gram(s) intravenous once a day  cholecalciferol: 1 tab(s) orally once a day  Multiple Vitamins oral tablet: 1 tab(s) orally once a day  Synthroid 125 mcg (0.125 mg) oral tablet: 1 tab(s) orally once a day (in the morning)  traMADol 50 mg oral tablet: 1 tab(s) orally every 6 hours As needed Moderate Pain (4 - 6)  Zepbound 5 mg/0.5 mL subcutaneous solution: 5 milligram(s) subcutaneously once a week

## 2024-08-23 NOTE — BRIEF OPERATIVE NOTE - OPERATION/FINDINGS
LUE 4 fr 20 cm Bard power midline inserted via basilic vein. 
Right foot wound down to skin, subcutaneous tissue, fat

## 2024-08-23 NOTE — BRIEF OPERATIVE NOTE - NSICDXBRIEFPOSTOP_GEN_ALL_CORE_FT
POST-OP DIAGNOSIS:  Wound of right foot 20-Aug-2024 19:51:21  Gerardo Brandon  
POST-OP DIAGNOSIS:  Wound of right foot 20-Aug-2024 19:51:21  Gerardo Brandon

## 2024-08-23 NOTE — OCCUPATIONAL THERAPY INITIAL EVALUATION ADULT - REHAB POTENTIAL, OT EVAL
Pt does not require further skilled OT as she completes all ADLs with standby assist/independence- pt in agreement- will d/c OT./none

## 2024-08-23 NOTE — PHYSICAL THERAPY INITIAL EVALUATION ADULT - PERTINENT HX OF CURRENT PROBLEM, REHAB EVAL
Per H&P: Pt "is a 59-year-old female who presents to the emergency room with a right ankle wound.  Past medical history of prediabetes, hypothyroidism, osteoarthritis, trochanteric bursitis, asthma, Raynaud's,  obstructive sleep apnea, status post right lateral ankle lipoma excision and subsequently formed a large hematoma at the surgical site.  She has been following with the wound care center but worsening of the hematoma and superficial skin necrosis.  She was advised to come to the emergency room for further workup and admission.  Reports that the procedure was 3 weeks ago and initially after she was started on Keflex she had worsening symptoms and then was started on Augmentin which she completed yesterday.  Denies any fevers chills nausea vomiting chest pain or shortness of breath.  Does endorse significant pain at the site."

## 2024-08-23 NOTE — PROGRESS NOTE ADULT - PROBLEM SELECTOR PLAN 1
Patient seen and evaluated.  Answered questions regarding post-op period, and patient demonstrated verbal understanding of the current care plan.  Wound VAC changed today and DSD applied.  Podiatry will continue to monitor closely.  Plan discussed with attending.
Patient seen and evaluated.  Answered questions regarding post-op period, and patient demonstrated verbal understanding of the current care plan.  Wound VAC noted to be in place working properly today.  Podiatry will continue to monitor closely.  Plan discussed with attending.
continue synthroid
Patient seen and evaluated.  Answered questions regarding post-op period, and patient demonstrated verbal understanding of the current care plan.  Wound VAC changed today and DSD applied.  Patient is cleared for discharge from podiatry, and will follow up on Monday at the wound care center with Dr. Brandon.  Plan discussed with attending and .
continue synthroid
continue synthroid

## 2024-08-23 NOTE — DISCHARGE NOTE PROVIDER - NSDCCPCAREPLAN_GEN_ALL_CORE_FT
PRINCIPAL DISCHARGE DIAGNOSIS  Diagnosis: Wound of right ankle  Assessment and Plan of Treatment: follow up with [facility] Dr. Brandon

## 2024-08-23 NOTE — BRIEF OPERATIVE NOTE - NSICDXBRIEFPREOP_GEN_ALL_CORE_FT
PRE-OP DIAGNOSIS:  Wound of right foot 20-Aug-2024 19:51:09  Gerardo Brandon  
PRE-OP DIAGNOSIS:  Wound of right foot 20-Aug-2024 19:51:09  Gerardo Brandon

## 2024-08-23 NOTE — BRIEF OPERATIVE NOTE - NSICDXBRIEFPROCEDURE_GEN_ALL_CORE_FT
PROCEDURES:  Selective debridement of wound 20-Aug-2024 19:49:19  Gerardo Brandon  
PROCEDURES:  Midline catheter insertion 23-Aug-2024 09:07:42  Ang Layton

## 2024-08-25 LAB
CULTURE RESULTS: SIGNIFICANT CHANGE UP
SPECIMEN SOURCE: SIGNIFICANT CHANGE UP

## 2024-08-26 ENCOUNTER — OUTPATIENT (OUTPATIENT)
Dept: OUTPATIENT SERVICES | Facility: HOSPITAL | Age: 59
LOS: 1 days | Discharge: HOME | End: 2024-08-26
Payer: COMMERCIAL

## 2024-08-26 ENCOUNTER — APPOINTMENT (OUTPATIENT)
Dept: WOUND CARE | Facility: HOSPITAL | Age: 59
End: 2024-08-26

## 2024-08-26 VITALS
OXYGEN SATURATION: 98 % | TEMPERATURE: 98.7 F | WEIGHT: 220 LBS | DIASTOLIC BLOOD PRESSURE: 65 MMHG | HEIGHT: 62 IN | RESPIRATION RATE: 18 BRPM | BODY MASS INDEX: 40.48 KG/M2 | HEART RATE: 75 BPM | SYSTOLIC BLOOD PRESSURE: 107 MMHG

## 2024-08-26 DIAGNOSIS — E11.621 TYPE 2 DIABETES MELLITUS WITH FOOT ULCER: ICD-10-CM

## 2024-08-26 DIAGNOSIS — Z98.890 OTHER SPECIFIED POSTPROCEDURAL STATES: Chronic | ICD-10-CM

## 2024-08-26 DIAGNOSIS — T81.31XA DISRUPTION OF EXTERNAL OPERATION (SURGICAL) WOUND, NOT ELSEWHERE CLASSIFIED, INITIAL ENCOUNTER: ICD-10-CM

## 2024-08-26 DIAGNOSIS — Z98.891 HISTORY OF UTERINE SCAR FROM PREVIOUS SURGERY: Chronic | ICD-10-CM

## 2024-08-26 DIAGNOSIS — Z96.642 PRESENCE OF LEFT ARTIFICIAL HIP JOINT: Chronic | ICD-10-CM

## 2024-08-26 PROCEDURE — 97605 NEG PRS WND THER DME<=50SQCM: CPT

## 2024-08-26 PROCEDURE — 99024 POSTOP FOLLOW-UP VISIT: CPT

## 2024-08-26 RX ORDER — CEFTRIAXONE 2 G/1
2 INJECTION, POWDER, FOR SOLUTION INTRAMUSCULAR; INTRAVENOUS
Refills: 0 | Status: ACTIVE | COMMUNITY

## 2024-08-27 PROCEDURE — 96367 TX/PROPH/DG ADDL SEQ IV INF: CPT

## 2024-08-27 PROCEDURE — 97166 OT EVAL MOD COMPLEX 45 MIN: CPT

## 2024-08-27 PROCEDURE — 85025 COMPLETE CBC W/AUTO DIFF WBC: CPT

## 2024-08-27 PROCEDURE — 80053 COMPREHEN METABOLIC PANEL: CPT

## 2024-08-27 PROCEDURE — 36573 INSJ PICC RS&I 5 YR+: CPT

## 2024-08-27 PROCEDURE — 97162 PT EVAL MOD COMPLEX 30 MIN: CPT

## 2024-08-27 PROCEDURE — 88304 TISSUE EXAM BY PATHOLOGIST: CPT

## 2024-08-27 PROCEDURE — 87070 CULTURE OTHR SPECIMN AEROBIC: CPT

## 2024-08-27 PROCEDURE — 36415 COLL VENOUS BLD VENIPUNCTURE: CPT

## 2024-08-27 PROCEDURE — 85027 COMPLETE CBC AUTOMATED: CPT

## 2024-08-27 PROCEDURE — 73610 X-RAY EXAM OF ANKLE: CPT

## 2024-08-27 PROCEDURE — 85730 THROMBOPLASTIN TIME PARTIAL: CPT

## 2024-08-27 PROCEDURE — 80048 BASIC METABOLIC PNL TOTAL CA: CPT

## 2024-08-27 PROCEDURE — 96375 TX/PRO/DX INJ NEW DRUG ADDON: CPT

## 2024-08-27 PROCEDURE — 85652 RBC SED RATE AUTOMATED: CPT

## 2024-08-27 PROCEDURE — 83605 ASSAY OF LACTIC ACID: CPT

## 2024-08-27 PROCEDURE — 87075 CULTR BACTERIA EXCEPT BLOOD: CPT

## 2024-08-27 PROCEDURE — 76937 US GUIDE VASCULAR ACCESS: CPT

## 2024-08-27 PROCEDURE — 87040 BLOOD CULTURE FOR BACTERIA: CPT

## 2024-08-27 PROCEDURE — 93005 ELECTROCARDIOGRAM TRACING: CPT

## 2024-08-27 PROCEDURE — 99285 EMERGENCY DEPT VISIT HI MDM: CPT

## 2024-08-27 PROCEDURE — 86900 BLOOD TYPING SEROLOGIC ABO: CPT

## 2024-08-27 PROCEDURE — 96365 THER/PROPH/DIAG IV INF INIT: CPT

## 2024-08-27 PROCEDURE — 87637 SARSCOV2&INF A&B&RSV AMP PRB: CPT

## 2024-08-27 PROCEDURE — 86901 BLOOD TYPING SEROLOGIC RH(D): CPT

## 2024-08-27 PROCEDURE — 85610 PROTHROMBIN TIME: CPT

## 2024-08-27 PROCEDURE — 86850 RBC ANTIBODY SCREEN: CPT

## 2024-08-27 PROCEDURE — 84145 PROCALCITONIN (PCT): CPT

## 2024-08-27 PROCEDURE — 86140 C-REACTIVE PROTEIN: CPT

## 2024-08-27 NOTE — ASSESSMENT
[Verbal] : Verbal [Demo] : Demo [Patient] : Patient [Family member] : Family member [Good - alert, interested, motivated] : Good - alert, interested, motivated [Verbalizes knowledge/Understanding] : Verbalizes knowledge/understanding [Dressing changes] : dressing changes [Foot Care] : foot care [Skin Care] : skin care [Pressure relief] : pressure relief [Signs and symptoms of infection] : sign and symptoms of infection [Surgery] : surgery [Venous Disease] : venous disease [Nutrition] : nutrition [How and When to Call] : how and when to call [Labs and Tests] : labs and tests [Pain Management] : pain management [Off-loading] : off-loading [Compression Therapy] : compression therapy [Patient responsibility to plan of care] : patient responsibility to plan of care [Stable] : stable [Not Applicable - Long Term Care/Home Health Agency] : Long Term Care/Home Health Agency: Not Applicable [Home] : Home [Walker] : Walker [] : Yes [FreeTextEntry2] : Infection prevention s/s of infection Wound care (dressing changes) Maintain optimal skin integrity to high pressure areas Compression therapy Pressure relief/ Pressure redistribution Offloading the stress on skin structures and decreasing potential pathologic biomechanical influences.  [FreeTextEntry3] : wound is clean and well granulated - no necrotic tissue [FreeTextEntry4] : Pt was discharged from [facility] 8/23/24 with NPWT and IV Antibiotic- Infusion [facility].  Pt reminded to keep the foot dry and reinforce wound vac drape with tegaderm if needed. The pt was reminded of the [employer] contact number to trouble shoot any wound vac issues. Home care was not initiated during discharge for bandage changes.  Pt states she will be coming to the [facility] for dressing changes 3x week and has scheduled appts for dressing changes. Pt is using a walker for ambulation assistance and a post op surgical shoe to reduce pressure on the right foot. Pt had nutritional and ID consults while inpatient. Pt was advised by the Dpm that she can proceed with the Orthovisc injections as planned. - pt provided with a clearance note by the Md . F/U 8/28/24 & 8/30/24 for dressing change 1 week for assess

## 2024-08-27 NOTE — VITALS
[Pain related to present condition?] : The patient's  pain is related to present condition. [] : No [de-identified] : 3/10 [FreeTextEntry3] : right lateral ankle [FreeTextEntry1] : elevation/ tylenol/tramadol [FreeTextEntry2] : walking pressure [FreeTextEntry5] : Medication reconciliation done today

## 2024-08-27 NOTE — PLAN
[FreeTextEntry1] : Patient examined and evaluated at this time.  Continue local wound care and offloading. Continue with Wound VAC at this time. Patient to follow-up in 1 week.  Spent 20 minutes in patient care and medical decision making.

## 2024-08-27 NOTE — PHYSICAL EXAM
[4 x 4] : 4 x 4  [Abdominal Pad] : Abdominal Pad [2+] : left 2+ [de-identified] : none [Ankle Swelling (On Exam)] : not present [Varicose Veins Of Lower Extremities] : not present [] : not present [de-identified] : A&Ox3, NAD [de-identified] : 5 out of 5 strength in all quadrants bilaterally, ankle joint and subtalar joint range of motion intact [de-identified] : Status post left ankle soft tissue mass excision.  Right ankle incision dehiscence with open wound down to skin, subcutaneous tissue, fat, granular tissue [de-identified] : Light touch sensation intact bilaterally [FreeTextEntry1] : Right Foot, S/P surgical debridement 8/20/24 [FreeTextEntry2] : 4.5 [FreeTextEntry3] : 3.0 [FreeTextEntry4] : 1.0 [de-identified] : sanguineous [de-identified] : none [de-identified] : surgical [de-identified] : none [de-identified] : none [de-identified] :  Post compression placement assessment: -circulation WNL -patient states full comfort and full ROM -two fingers can slide in when assessed [de-identified] : Wound vac- 1 piece of black foam [de-identified] : Mechanically cleansed with sterile gauze and normal saline 0.9% kerlix  wound vac- continuous therapy 125 MMHG    [TWNoteComboBox4] : Moderate [TWNoteComboBox6] : Surgical [de-identified] : False [de-identified] : Macerated [de-identified] : 100% [de-identified] : No [TWNoteComboBox7] : False [de-identified] : False [de-identified] : Ace wraps [de-identified] : 3x Weekly [de-identified] : Primary Dressing

## 2024-08-27 NOTE — HISTORY OF PRESENT ILLNESS
[FreeTextEntry1] : Patient presents to [facility] with lipoma of bilateral ankles. Right ankle pain secondary to the lipoma, the pain is about a 7/10. The pain is relieved by topical over the counter anesthetic. The lipoma started about a year ago, the pain started hurting about 4-5 months ago. Patient went to podiatrist, referred here for possible surgical intervention after sonongram was performed and lipoma was diagnosed.  8/26/24 patient seen status post right ankle excision of a lipoma (DOS: 7/30/2024), dehiscence with hematoma, and now with open wound down to skin, subcutaneous tissue, fat with recent hospitalization and discharge, currently on IV abx and wound VAC.

## 2024-08-27 NOTE — VITALS
[Pain related to present condition?] : The patient's  pain is related to present condition. [] : No [de-identified] : 3/10 [FreeTextEntry3] : right lateral ankle [FreeTextEntry1] : elevation/ tylenol/tramadol [FreeTextEntry2] : walking pressure [FreeTextEntry5] : Medication reconciliation done today

## 2024-08-27 NOTE — PHYSICAL EXAM
[4 x 4] : 4 x 4  [Abdominal Pad] : Abdominal Pad [2+] : left 2+ [de-identified] : none [Ankle Swelling (On Exam)] : not present [Varicose Veins Of Lower Extremities] : not present [] : not present [de-identified] : A&Ox3, NAD [de-identified] : 5 out of 5 strength in all quadrants bilaterally, ankle joint and subtalar joint range of motion intact [de-identified] : Status post left ankle soft tissue mass excision.  Right ankle incision dehiscence with open wound down to skin, subcutaneous tissue, fat, granular tissue [de-identified] : Light touch sensation intact bilaterally [FreeTextEntry1] : Right Foot, S/P surgical debridement 8/20/24 [FreeTextEntry2] : 4.5 [FreeTextEntry3] : 3.0 [FreeTextEntry4] : 1.0 [de-identified] : sanguineous [de-identified] : none [de-identified] : surgical [de-identified] : none [de-identified] : none [de-identified] :  Post compression placement assessment: -circulation WNL -patient states full comfort and full ROM -two fingers can slide in when assessed [de-identified] : Wound vac- 1 piece of black foam [de-identified] : Mechanically cleansed with sterile gauze and normal saline 0.9% kerlix  wound vac- continuous therapy 125 MMHG    [TWNoteComboBox4] : Moderate [TWNoteComboBox6] : Surgical [de-identified] : False [de-identified] : Macerated [de-identified] : 100% [de-identified] : No [TWNoteComboBox7] : False [de-identified] : False [de-identified] : Ace wraps [de-identified] : 3x Weekly [de-identified] : Primary Dressing

## 2024-08-27 NOTE — ASSESSMENT
[Verbal] : Verbal [Demo] : Demo [Patient] : Patient [Family member] : Family member [Good - alert, interested, motivated] : Good - alert, interested, motivated [Verbalizes knowledge/Understanding] : Verbalizes knowledge/understanding [Dressing changes] : dressing changes [Foot Care] : foot care [Skin Care] : skin care [Pressure relief] : pressure relief [Signs and symptoms of infection] : sign and symptoms of infection [Surgery] : surgery [Venous Disease] : venous disease [Nutrition] : nutrition [How and When to Call] : how and when to call [Labs and Tests] : labs and tests [Pain Management] : pain management [Off-loading] : off-loading [Compression Therapy] : compression therapy [Patient responsibility to plan of care] : patient responsibility to plan of care [Stable] : stable [Not Applicable - Long Term Care/Home Health Agency] : Long Term Care/Home Health Agency: Not Applicable [Home] : Home [Walker] : Walker [] : Yes [FreeTextEntry2] : Infection prevention s/s of infection Wound care (dressing changes) Maintain optimal skin integrity to high pressure areas Compression therapy Pressure relief/ Pressure redistribution Offloading the stress on skin structures and decreasing potential pathologic biomechanical influences.  [FreeTextEntry3] : wound is clean and well granulated - no necrotic tissue [FreeTextEntry4] : Pt was discharged from [facility] 8/23/24 with NPWT and IV Antibiotic- Infusion [facility].  Pt reminded to keep the foot dry and reinforce wound vac drape with tegaderm if needed. The pt was reminded of the [employer] contact number to trouble shoot any wound vac issues. Home care was not initiated during discharge for bandage changes.  Pt states she will be coming to the [facility] for dressing changes 3x week and has scheduled appts for dressing changes. Pt is using a walker for ambulation assistance and a post op surgical shoe to reduce pressure on the right foot. Pt had nutritional and ID consults while inpatient. Pt was advised by the Dpm that she can proceed with the Orthovisc injections as planned. - pt provided with a clearance note by the Md . F/U 8/28/24 & 8/30/24 for dressing change 1 week for assess

## 2024-08-27 NOTE — HISTORY OF PRESENT ILLNESS
[FreeTextEntry1] : Patient presents to [facility] with lipoma of bilateral ankles. Right ankle pain secondary to the lipoma, the pain is about a 7/10. The pain is relieved by topical over the counter anesthetic. The lipoma started about a year ago, the pain started hurting about 4-5 months ago. Patient went to podiatrist, referred here for possible surgical intervention after sonongram was performed and lipoma was diagnosed.  8/26/24 patient seen status post right ankle excision of a lipoma (DOS: 7/30/2024), dehiscence with hematoma, and now with open wound down to skin, subcutaneous tissue, fat with recent hospitalization and discharge, currently on IV abx and wound VAC.

## 2024-08-28 ENCOUNTER — OUTPATIENT (OUTPATIENT)
Dept: OUTPATIENT SERVICES | Facility: HOSPITAL | Age: 59
LOS: 1 days | Discharge: HOME | End: 2024-08-28
Payer: COMMERCIAL

## 2024-08-28 ENCOUNTER — APPOINTMENT (OUTPATIENT)
Dept: WOUND CARE | Facility: HOSPITAL | Age: 59
End: 2024-08-28

## 2024-08-28 VITALS
BODY MASS INDEX: 40.48 KG/M2 | HEIGHT: 62 IN | TEMPERATURE: 98.8 F | SYSTOLIC BLOOD PRESSURE: 100 MMHG | RESPIRATION RATE: 18 BRPM | WEIGHT: 220 LBS | OXYGEN SATURATION: 95 % | DIASTOLIC BLOOD PRESSURE: 65 MMHG | HEART RATE: 64 BPM

## 2024-08-28 DIAGNOSIS — Z96.642 PRESENCE OF LEFT ARTIFICIAL HIP JOINT: Chronic | ICD-10-CM

## 2024-08-28 DIAGNOSIS — Z98.891 HISTORY OF UTERINE SCAR FROM PREVIOUS SURGERY: Chronic | ICD-10-CM

## 2024-08-28 DIAGNOSIS — Z41.9 ENCOUNTER FOR PROCEDURE FOR PURPOSES OTHER THAN REMEDYING HEALTH STATE, UNSPECIFIED: Chronic | ICD-10-CM

## 2024-08-28 DIAGNOSIS — Z98.890 OTHER SPECIFIED POSTPROCEDURAL STATES: Chronic | ICD-10-CM

## 2024-08-28 DIAGNOSIS — Z90.3 ACQUIRED ABSENCE OF STOMACH [PART OF]: Chronic | ICD-10-CM

## 2024-08-28 DIAGNOSIS — T81.31XD DISRUPTION OF EXTERNAL OPERATION (SURGICAL) WOUND, NOT ELSEWHERE CLASSIFIED, SUBSEQUENT ENCOUNTER: ICD-10-CM

## 2024-08-28 PROCEDURE — 97605 NEG PRS WND THER DME<=50SQCM: CPT

## 2024-08-28 PROCEDURE — ZZZZZ: CPT

## 2024-08-30 ENCOUNTER — OUTPATIENT (OUTPATIENT)
Dept: OUTPATIENT SERVICES | Facility: HOSPITAL | Age: 59
LOS: 1 days | Discharge: HOME | End: 2024-08-30
Payer: COMMERCIAL

## 2024-08-30 ENCOUNTER — APPOINTMENT (OUTPATIENT)
Dept: WOUND CARE | Facility: HOSPITAL | Age: 59
End: 2024-08-30

## 2024-08-30 VITALS
HEART RATE: 66 BPM | WEIGHT: 220 LBS | DIASTOLIC BLOOD PRESSURE: 66 MMHG | BODY MASS INDEX: 40.48 KG/M2 | HEIGHT: 62 IN | RESPIRATION RATE: 18 BRPM | SYSTOLIC BLOOD PRESSURE: 100 MMHG | OXYGEN SATURATION: 97 % | TEMPERATURE: 98.1 F

## 2024-08-30 DIAGNOSIS — Z98.890 OTHER SPECIFIED POSTPROCEDURAL STATES: Chronic | ICD-10-CM

## 2024-08-30 DIAGNOSIS — E11.621 TYPE 2 DIABETES MELLITUS WITH FOOT ULCER: ICD-10-CM
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
## 2024-08-30 DIAGNOSIS — L97.312 NON-PRESSURE CHRONIC ULCER OF RIGHT ANKLE WITH FAT LAYER EXPOSED: ICD-10-CM

## 2024-08-30 DIAGNOSIS — T81.31XD DISRUPTION OF EXTERNAL OPERATION (SURGICAL) WOUND, NOT ELSEWHERE CLASSIFIED, SUBSEQUENT ENCOUNTER: ICD-10-CM

## 2024-08-30 DIAGNOSIS — Z41.9 ENCOUNTER FOR PROCEDURE FOR PURPOSES OTHER THAN REMEDYING HEALTH STATE, UNSPECIFIED: Chronic | ICD-10-CM
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
## 2024-08-30 DIAGNOSIS — Z90.3 ACQUIRED ABSENCE OF STOMACH [PART OF]: Chronic | ICD-10-CM

## 2024-08-30 PROCEDURE — G0463: CPT

## 2024-08-30 PROCEDURE — 99024 POSTOP FOLLOW-UP VISIT: CPT

## 2024-08-30 NOTE — ASSESSMENT
[Verbal] : Verbal [Written] : Written [Demo] : Demo [Patient] : Patient [Spouse] : Spouse [Good - alert, interested, motivated] : Good - alert, interested, motivated [Verbalizes knowledge/Understanding] : Verbalizes knowledge/understanding [Dressing changes] : dressing changes [Skin Care] : skin care [Signs and symptoms of infection] : sign and symptoms of infection [Nutrition] : nutrition [How and When to Call] : how and when to call [Pain Management] : pain management [Patient responsibility to plan of care] : patient responsibility to plan of care [Stable] : stable [Home] : Home [Walker] : Walker [Not Applicable - Long Term Care/Home Health Agency] : Long Term Care/Home Health Agency: Not Applicable [] : No [FreeTextEntry2] : Infection prevention Localized wound care  Goal remaining pain free regarding wounds [FreeTextEntry4] : Pt has her last IV antibiotic treatment 9/3/24  Supply order placed.  Pt will perform her own dressing changes.  Follow up 9/3/24.

## 2024-08-30 NOTE — PLAN
[FreeTextEntry1] : Patient examined and evaluated at this time.  Continue local wound care and offloading. Continue with local wound care at this time. Patient to follow-up in 4 days.  Spent 20 minutes in patient care and medical decision making.

## 2024-08-30 NOTE — HISTORY OF PRESENT ILLNESS
[FreeTextEntry1] : Patient presents to [facility] with lipoma of bilateral ankles. Right ankle pain secondary to the lipoma, the pain is about a 7/10. The pain is relieved by topical over the counter anesthetic. The lipoma started about a year ago, the pain started hurting about 4-5 months ago. Patient went to podiatrist, referred here for possible surgical intervention after sonongram was performed and lipoma was diagnosed.  8/30/24 patient seen status post right ankle excision of a lipoma (DOS: 7/30/2024), dehiscence with hematoma, and now with open wound down to skin, subcutaneous tissue, fat with recent hospitalization and discharge, currently on IV abx and wound VAC.

## 2024-08-30 NOTE — PHYSICAL EXAM
[4 x 4] : 4 x 4  [Abdominal Pad] : Abdominal Pad [2+] : left 2+ [Ankle Swelling (On Exam)] : not present [Varicose Veins Of Lower Extremities] : not present [] : not present [de-identified] : A&Ox3, NAD [de-identified] : 5 out of 5 strength in all quadrants bilaterally, ankle joint and subtalar joint range of motion intact [de-identified] : Status post left ankle soft tissue mass excision.  Right ankle incision dehiscence with open wound down to skin, subcutaneous tissue, fat, granular tissue [de-identified] : Light touch sensation intact bilaterally [FreeTextEntry1] : Right foot, S/P surgical debridement 8/20/24     [FreeTextEntry2] : 3.8 [FreeTextEntry3] : 2.5 [FreeTextEntry4] : 0.3 [de-identified] : Serous/sanguinous [de-identified] : mild  [de-identified] : For support only  [de-identified] : Silver alginate [de-identified] : Mechanically cleansed with sterile gauze and normal saline. Kerlix  [TWNoteComboBox4] : Small [TWNoteComboBox6] : Surgical [de-identified] : Macerated [de-identified] : None [de-identified] : None [de-identified] : 100% [de-identified] : No [de-identified] : Ace wraps [de-identified] : Daily [de-identified] : Primary Dressing

## 2024-08-30 NOTE — HISTORY OF PRESENT ILLNESS
[FreeTextEntry1] : Patient presents to [facility] with lipoma of bilateral ankles. Right ankle pain secondary to the lipoma, the pain is about a 7/10. The pain is relieved by topical over the counter anesthetic. The lipoma started about a year ago, the pain started hurting about 4-5 months ago. Patient went to podiatrist, referred here for possible surgical intervention after sonongram was performed and lipoma was diagnosed.  8/30/24 patient seen status post right ankle excision of a lipoma (DOS: 7/30/2024), dehiscence with hematoma, and now with open wound down to skin, subcutaneous tissue, fat with recent hospitalization and discharge, currently on IV abx and wound VAC.

## 2024-08-30 NOTE — PHYSICAL EXAM
[4 x 4] : 4 x 4  [Abdominal Pad] : Abdominal Pad [2+] : left 2+ [Ankle Swelling (On Exam)] : not present [Varicose Veins Of Lower Extremities] : not present [] : not present [de-identified] : A&Ox3, NAD [de-identified] : 5 out of 5 strength in all quadrants bilaterally, ankle joint and subtalar joint range of motion intact [de-identified] : Status post left ankle soft tissue mass excision.  Right ankle incision dehiscence with open wound down to skin, subcutaneous tissue, fat, granular tissue [de-identified] : Light touch sensation intact bilaterally [FreeTextEntry1] : Right foot, S/P surgical debridement 8/20/24     [FreeTextEntry2] : 3.8 [FreeTextEntry3] : 2.5 [FreeTextEntry4] : 0.3 [de-identified] : Serous/sanguinous [de-identified] : mild  [de-identified] : For support only  [de-identified] : Silver alginate [de-identified] : Mechanically cleansed with sterile gauze and normal saline. Kerlix  [TWNoteComboBox4] : Small [TWNoteComboBox6] : Surgical [de-identified] : Macerated [de-identified] : None [de-identified] : None [de-identified] : 100% [de-identified] : No [de-identified] : Ace wraps [de-identified] : Daily [de-identified] : Primary Dressing

## 2024-08-31 DIAGNOSIS — I73.00 RAYNAUD'S SYNDROME WITHOUT GANGRENE: ICD-10-CM

## 2024-08-31 DIAGNOSIS — T81.89XD OTHER COMPLICATIONS OF PROCEDURES, NOT ELSEWHERE CLASSIFIED, SUBSEQUENT ENCOUNTER: ICD-10-CM

## 2024-08-31 DIAGNOSIS — G47.33 OBSTRUCTIVE SLEEP APNEA (ADULT) (PEDIATRIC): ICD-10-CM

## 2024-08-31 DIAGNOSIS — Z82.49 FAMILY HISTORY OF ISCHEMIC HEART DISEASE AND OTHER DISEASES OF THE CIRCULATORY SYSTEM: ICD-10-CM

## 2024-08-31 DIAGNOSIS — Z96.649 PRESENCE OF UNSPECIFIED ARTIFICIAL HIP JOINT: ICD-10-CM

## 2024-08-31 DIAGNOSIS — Z87.09 PERSONAL HISTORY OF OTHER DISEASES OF THE RESPIRATORY SYSTEM: ICD-10-CM

## 2024-08-31 DIAGNOSIS — Z83.3 FAMILY HISTORY OF DIABETES MELLITUS: ICD-10-CM

## 2024-08-31 DIAGNOSIS — T81.31XD DISRUPTION OF EXTERNAL OPERATION (SURGICAL) WOUND, NOT ELSEWHERE CLASSIFIED, SUBSEQUENT ENCOUNTER: ICD-10-CM

## 2024-08-31 DIAGNOSIS — I10 ESSENTIAL (PRIMARY) HYPERTENSION: ICD-10-CM

## 2024-08-31 DIAGNOSIS — E03.9 HYPOTHYROIDISM, UNSPECIFIED: ICD-10-CM

## 2024-08-31 DIAGNOSIS — Z98.890 OTHER SPECIFIED POSTPROCEDURAL STATES: ICD-10-CM

## 2024-08-31 DIAGNOSIS — Z86.16 PERSONAL HISTORY OF COVID-19: ICD-10-CM

## 2024-08-31 DIAGNOSIS — Z80.8 FAMILY HISTORY OF MALIGNANT NEOPLASM OF OTHER ORGANS OR SYSTEMS: ICD-10-CM

## 2024-08-31 DIAGNOSIS — L97.312 NON-PRESSURE CHRONIC ULCER OF RIGHT ANKLE WITH FAT LAYER EXPOSED: ICD-10-CM

## 2024-08-31 DIAGNOSIS — Z80.41 FAMILY HISTORY OF MALIGNANT NEOPLASM OF OVARY: ICD-10-CM

## 2024-08-31 DIAGNOSIS — Z98.84 BARIATRIC SURGERY STATUS: ICD-10-CM

## 2024-08-31 DIAGNOSIS — Z83.511 FAMILY HISTORY OF GLAUCOMA: ICD-10-CM
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
## 2024-09-03 ENCOUNTER — APPOINTMENT (OUTPATIENT)
Dept: WOUND CARE | Facility: HOSPITAL | Age: 59
End: 2024-09-03

## 2024-09-03 ENCOUNTER — OUTPATIENT (OUTPATIENT)
Dept: OUTPATIENT SERVICES | Facility: HOSPITAL | Age: 59
LOS: 1 days | Discharge: HOME | End: 2024-09-03
Payer: COMMERCIAL

## 2024-09-03 VITALS
BODY MASS INDEX: 40.48 KG/M2 | SYSTOLIC BLOOD PRESSURE: 113 MMHG | RESPIRATION RATE: 20 BRPM | HEIGHT: 62 IN | HEART RATE: 60 BPM | WEIGHT: 220 LBS | TEMPERATURE: 97.8 F | DIASTOLIC BLOOD PRESSURE: 80 MMHG | OXYGEN SATURATION: 97 %

## 2024-09-03 DIAGNOSIS — Z98.891 HISTORY OF UTERINE SCAR FROM PREVIOUS SURGERY: Chronic | ICD-10-CM

## 2024-09-03 DIAGNOSIS — L97.801 NON-PRESSURE CHRONIC ULCER OF OTHER PART OF UNSPECIFIED LOWER LEG LIMITED TO BREAKDOWN OF SKIN: ICD-10-CM

## 2024-09-03 DIAGNOSIS — T81.31XD DISRUPTION OF EXTERNAL OPERATION (SURGICAL) WOUND, NOT ELSEWHERE CLASSIFIED, SUBSEQUENT ENCOUNTER: ICD-10-CM

## 2024-09-03 DIAGNOSIS — Z98.890 OTHER SPECIFIED POSTPROCEDURAL STATES: Chronic | ICD-10-CM

## 2024-09-03 DIAGNOSIS — Z41.9 ENCOUNTER FOR PROCEDURE FOR PURPOSES OTHER THAN REMEDYING HEALTH STATE, UNSPECIFIED: Chronic | ICD-10-CM

## 2024-09-03 DIAGNOSIS — Z96.642 PRESENCE OF LEFT ARTIFICIAL HIP JOINT: Chronic | ICD-10-CM

## 2024-09-03 PROCEDURE — G0463: CPT

## 2024-09-03 PROCEDURE — 99024 POSTOP FOLLOW-UP VISIT: CPT

## 2024-09-03 NOTE — PHYSICAL EXAM
[4 x 4] : 4 x 4  [Abdominal Pad] : Abdominal Pad [2+] : left 2+ [Ankle Swelling (On Exam)] : not present [Varicose Veins Of Lower Extremities] : not present [] : not present [Purpura] : no purpura  [Petechiae] : no petechiae [Skin Ulcer] : ulcer [Alert] : alert [Oriented to Place] : oriented to place [Calm] : calm [de-identified] : A&Ox3, NAD [de-identified] : 5 out of 5 strength in all quadrants bilaterally, ankle joint and subtalar joint range of motion intact [de-identified] : Status post left ankle soft tissue mass excision.  Right ankle incision dehiscence with open wound down to skin, subcutaneous tissue, fat, granular tissue [de-identified] : Light touch sensation intact bilaterally [de-identified] : Wound vac on hold [FreeTextEntry1] : Right foot, S/P surgical debridement 8/20/24     [FreeTextEntry2] : 3.8 [FreeTextEntry3] : 2.1 [FreeTextEntry4] : 0.2-0.3 [de-identified] : Serous/sanguinous [de-identified] : Mild [de-identified] : >80% [de-identified] : For support only  [de-identified] : Medihoney [de-identified] : Mechanically cleansed with sterile gauze and normal saline. Kerlix  [TWNoteComboBox4] : Small [TWNoteComboBox6] : Surgical [de-identified] : Macerated [de-identified] : None [de-identified] : None [de-identified] : <20% [de-identified] : Yes [de-identified] : Ace wraps [de-identified] : Every other day [de-identified] : Primary Dressing

## 2024-09-03 NOTE — ASSESSMENT
[Verbal] : Verbal [Written] : Written [Demo] : Demo [Patient] : Patient [Spouse] : Spouse [Good - alert, interested, motivated] : Good - alert, interested, motivated [Verbalizes knowledge/Understanding] : Verbalizes knowledge/understanding [Dressing changes] : dressing changes [Skin Care] : skin care [Signs and symptoms of infection] : sign and symptoms of infection [Nutrition] : nutrition [How and When to Call] : how and when to call [Pain Management] : pain management [Patient responsibility to plan of care] : patient responsibility to plan of care [Stable] : stable [Home] : Home [Walker] : Walker [Not Applicable - Long Term Care/Home Health Agency] : Long Term Care/Home Health Agency: Not Applicable [Foot Care] : foot care [Pressure relief] : pressure relief [Other: ____] : [unfilled] [Compression Therapy] : compression therapy [] : No [FreeTextEntry2] : Infection prevention S/S of infection Pain management Localized wound care  Goal remaining pain free regarding wounds Enzymatic debridement Ambulation safety Regular f/u with primary medical team  [FreeTextEntry4] : Pt completed her IV antibiotic treatment today-9/3/24 and the midline was removed.  Orthovisc injections are scheduled on 9/6/24 & 9/13/24. Wound care supply requested on 8/30/24- pt awaiting supply delivery. Wc supply resubmitted today. Pt has blood work drawn on 8/30/24- CBC W/DIFF, CMP, CRP. Resulr ts available. Pt will perform her own dressing changes. Pt provided with a small number of supplies to prevent delay of care. [facility] escribed to the pts pharmacy Pt states she is going on vacation starting 9/21/24. Pt to speak with the Dpm re: refill of Tramadol at next visit. Spoke with Dpm (K,J) re: wound status- authorization submitted for sharp debridement during the wc appt on 9/5/24. F/U 1 week for assesment and 2x week for dressing change (per pt request)

## 2024-09-03 NOTE — VITALS
[Pain related to present condition?] : The patient's  pain is not related to present condition. [] : No [de-identified] : 1-2 /10 [FreeTextEntry1] : Tylenol during the day Tramadol t night/  resting [FreeTextEntry2] : walking standing for long periods of time

## 2024-09-03 NOTE — REVIEW OF SYSTEMS
[Fever] : no fever [Chills] : no chills [Eye Pain] : no eye pain [Loss Of Hearing] : no hearing loss [Abdominal Pain] : no abdominal pain [Vomiting] : no vomiting [Anxiety] : no anxiety [Easy Bleeding] : no tendency for easy bleeding [Negative] : Endocrine [de-identified] : painful right ankle soft tissue mass , s/p surgical excision

## 2024-09-03 NOTE — PHYSICAL EXAM
[4 x 4] : 4 x 4  [Abdominal Pad] : Abdominal Pad [2+] : left 2+ [Ankle Swelling (On Exam)] : not present [Varicose Veins Of Lower Extremities] : not present [] : not present [Purpura] : no purpura  [Petechiae] : no petechiae [Skin Ulcer] : ulcer [Alert] : alert [Oriented to Place] : oriented to place [Calm] : calm [de-identified] : A&Ox3, NAD [de-identified] : 5 out of 5 strength in all quadrants bilaterally, ankle joint and subtalar joint range of motion intact [de-identified] : Status post left ankle soft tissue mass excision.  Right ankle incision dehiscence with open wound down to skin, subcutaneous tissue, fat, granular tissue [de-identified] : Light touch sensation intact bilaterally [de-identified] : Wound vac on hold [FreeTextEntry1] : Right foot, S/P surgical debridement 8/20/24     [FreeTextEntry2] : 3.8 [FreeTextEntry3] : 2.1 [FreeTextEntry4] : 0.2-0.3 [de-identified] : Serous/sanguinous [de-identified] : Mild [de-identified] : >80% [de-identified] : For support only  [de-identified] : Medihoney [de-identified] : Mechanically cleansed with sterile gauze and normal saline. Kerlix  [TWNoteComboBox4] : Small [TWNoteComboBox6] : Surgical [de-identified] : Macerated [de-identified] : None [de-identified] : None [de-identified] : <20% [de-identified] : Yes [de-identified] : Ace wraps [de-identified] : Every other day [de-identified] : Primary Dressing Same Histology In Subsequent Stages Text: The pattern and morphology of the tumor is as described in the first stage.

## 2024-09-03 NOTE — HISTORY OF PRESENT ILLNESS
[FreeTextEntry1] : s/p removal of lipoma right lateral ankle , with dehiscence of the surgical wound , clean and granular

## 2024-09-03 NOTE — REVIEW OF SYSTEMS
[Fever] : no fever [Chills] : no chills [Eye Pain] : no eye pain [Loss Of Hearing] : no hearing loss [Abdominal Pain] : no abdominal pain [Vomiting] : no vomiting [Anxiety] : no anxiety [Easy Bleeding] : no tendency for easy bleeding [Negative] : Endocrine [de-identified] : painful right ankle soft tissue mass , s/p surgical excision

## 2024-09-03 NOTE — VITALS
[Pain related to present condition?] : The patient's  pain is not related to present condition. [] : No [de-identified] : 1-2 /10 [FreeTextEntry1] : Tylenol during the day Tramadol t night/  resting [FreeTextEntry2] : walking standing for long periods of time

## 2024-09-03 NOTE — PLAN
[FreeTextEntry1] : Continue wound care Spent 20 minutes for patient care and medical decision making.PTR  1week
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
## 2024-09-04 DIAGNOSIS — E03.9 HYPOTHYROIDISM, UNSPECIFIED: ICD-10-CM

## 2024-09-04 DIAGNOSIS — Z98.84 BARIATRIC SURGERY STATUS: ICD-10-CM

## 2024-09-04 DIAGNOSIS — I10 ESSENTIAL (PRIMARY) HYPERTENSION: ICD-10-CM

## 2024-09-04 DIAGNOSIS — Z86.16 PERSONAL HISTORY OF COVID-19: ICD-10-CM

## 2024-09-04 DIAGNOSIS — Z87.09 PERSONAL HISTORY OF OTHER DISEASES OF THE RESPIRATORY SYSTEM: ICD-10-CM

## 2024-09-04 DIAGNOSIS — G47.33 OBSTRUCTIVE SLEEP APNEA (ADULT) (PEDIATRIC): ICD-10-CM

## 2024-09-04 DIAGNOSIS — Z82.49 FAMILY HISTORY OF ISCHEMIC HEART DISEASE AND OTHER DISEASES OF THE CIRCULATORY SYSTEM: ICD-10-CM

## 2024-09-04 DIAGNOSIS — T81.89XD OTHER COMPLICATIONS OF PROCEDURES, NOT ELSEWHERE CLASSIFIED, SUBSEQUENT ENCOUNTER: ICD-10-CM

## 2024-09-04 DIAGNOSIS — Z80.8 FAMILY HISTORY OF MALIGNANT NEOPLASM OF OTHER ORGANS OR SYSTEMS: ICD-10-CM

## 2024-09-04 DIAGNOSIS — L97.312 NON-PRESSURE CHRONIC ULCER OF RIGHT ANKLE WITH FAT LAYER EXPOSED: ICD-10-CM

## 2024-09-04 DIAGNOSIS — Z96.649 PRESENCE OF UNSPECIFIED ARTIFICIAL HIP JOINT: ICD-10-CM

## 2024-09-04 DIAGNOSIS — Z83.3 FAMILY HISTORY OF DIABETES MELLITUS: ICD-10-CM

## 2024-09-04 DIAGNOSIS — I73.00 RAYNAUD'S SYNDROME WITHOUT GANGRENE: ICD-10-CM
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
## 2024-09-04 DIAGNOSIS — Z98.890 OTHER SPECIFIED POSTPROCEDURAL STATES: ICD-10-CM

## 2024-09-04 DIAGNOSIS — Z83.511 FAMILY HISTORY OF GLAUCOMA: ICD-10-CM

## 2024-09-05 ENCOUNTER — OUTPATIENT (OUTPATIENT)
Dept: OUTPATIENT SERVICES | Facility: HOSPITAL | Age: 59
LOS: 1 days | Discharge: HOME | End: 2024-09-05
Payer: COMMERCIAL

## 2024-09-05 ENCOUNTER — APPOINTMENT (OUTPATIENT)
Dept: WOUND CARE | Facility: HOSPITAL | Age: 59
End: 2024-09-05

## 2024-09-05 VITALS
WEIGHT: 220 LBS | TEMPERATURE: 97.9 F | DIASTOLIC BLOOD PRESSURE: 85 MMHG | RESPIRATION RATE: 15 BRPM | HEIGHT: 62 IN | BODY MASS INDEX: 40.48 KG/M2 | OXYGEN SATURATION: 99 % | HEART RATE: 62 BPM | SYSTOLIC BLOOD PRESSURE: 135 MMHG

## 2024-09-05 DIAGNOSIS — Z96.642 PRESENCE OF LEFT ARTIFICIAL HIP JOINT: Chronic | ICD-10-CM

## 2024-09-05 DIAGNOSIS — Z98.890 OTHER SPECIFIED POSTPROCEDURAL STATES: Chronic | ICD-10-CM

## 2024-09-05 DIAGNOSIS — T81.31XD DISRUPTION OF EXTERNAL OPERATION (SURGICAL) WOUND, NOT ELSEWHERE CLASSIFIED, SUBSEQUENT ENCOUNTER: ICD-10-CM

## 2024-09-05 DIAGNOSIS — Z90.3 ACQUIRED ABSENCE OF STOMACH [PART OF]: Chronic | ICD-10-CM

## 2024-09-05 DIAGNOSIS — L97.312 NON-PRESSURE CHRONIC ULCER OF RIGHT ANKLE WITH FAT LAYER EXPOSED: ICD-10-CM

## 2024-09-05 PROCEDURE — 99024 POSTOP FOLLOW-UP VISIT: CPT

## 2024-09-05 PROCEDURE — 29581 APPL MULTLAYER CMPRN SYS LEG: CPT | Mod: RT

## 2024-09-05 RX ORDER — SUMATRIPTAN 100 MG/1
100 TABLET, FILM COATED ORAL
Qty: 27 | Refills: 0 | Status: ACTIVE | COMMUNITY
Start: 2024-04-25

## 2024-09-05 RX ORDER — ONDANSETRON 4 MG/1
4 TABLET ORAL
Qty: 18 | Refills: 0 | Status: ACTIVE | COMMUNITY
Start: 2024-08-28

## 2024-09-05 RX ORDER — LEVOTHYROXINE SODIUM 125 UG/1
125 TABLET ORAL
Qty: 98 | Refills: 0 | Status: ACTIVE | COMMUNITY
Start: 2024-07-26

## 2024-09-05 NOTE — HISTORY OF PRESENT ILLNESS
[FreeTextEntry1] : Patient presents to [facility] with lipoma of bilateral ankles. Right ankle pain secondary to the lipoma, the pain is about a 7/10. The pain is relieved by topical over the counter anesthetic. The lipoma started about a year ago, the pain started hurting about 4-5 months ago. Patient went to podiatrist, referred here for possible surgical intervention after sonongram was performed and lipoma was diagnosed.  9/5/24 patient seen status post right ankle excision of a lipoma (DOS: 7/30/2024), dehiscence with hematoma, and now with open wound down to skin, subcutaneous tissue, fat with recent hospitalization and discharge, finished a course of IV abx and wound VAC.

## 2024-09-05 NOTE — PLAN
[FreeTextEntry1] : Patient examined and evaluated at this time.  Continue local wound care and offloading. Continue with local wound care at this time. Patient to follow-up in 1 week.  Spent 20 minutes in patient care and medical decision making.

## 2024-09-05 NOTE — ASSESSMENT
[Verbal] : Verbal [Demo] : Demo [Patient] : Patient [Spouse] : Spouse [Good - alert, interested, motivated] : Good - alert, interested, motivated [Verbalizes knowledge/Understanding] : Verbalizes knowledge/understanding [Dressing changes] : dressing changes [Foot Care] : foot care [Skin Care] : skin care [Pressure relief] : pressure relief [Signs and symptoms of infection] : sign and symptoms of infection [Nutrition] : nutrition [How and When to Call] : how and when to call [Pain Management] : pain management [Compression Therapy] : compression therapy [Patient responsibility to plan of care] : patient responsibility to plan of care [Stable] : stable [Home] : Home [Not Applicable - Long Term Care/Home Health Agency] : Long Term Care/Home Health Agency: Not Applicable [] : No [FreeTextEntry2] : Infection prevention Wound care (dressing changes) Maintain optimal skin integrity to high pressure areas Compression therapy Nutrition and wound healing Elevation and low sodium compliance. Offloading the stress on skin structures and decreasing potential pathologic biomechanical influences. Collagen Matrix [FreeTextEntry4] : Start use of Juany with COBAN 3x/weekly. Patient will return to  for dressing changes.  DPM advised patient that it is okay to have knee injections in right knee, note provided to patient. Patient going on vacation 9/21. F/U Saturday for dressing change [Cane] : Cane

## 2024-09-05 NOTE — VITALS
[Pain related to present condition?] : The patient's  pain is related to present condition. [] : No [de-identified] : 1-2 /10 [FreeTextEntry3] : right ankle [FreeTextEntry1] : Tylenol during the day Tramadol night [FreeTextEntry2] : walking standing for long periods of time [FreeTextEntry4] : Tylenol prior to visit

## 2024-09-05 NOTE — PHYSICAL EXAM
[4 x 4] : 4 x 4  [Abdominal Pad] : Abdominal Pad [2+] : left 2+ [Ankle Swelling (On Exam)] : not present [Varicose Veins Of Lower Extremities] : not present [] : not present [de-identified] : A&Ox3, NAD [de-identified] : 5 out of 5 strength in all quadrants bilaterally, ankle joint and subtalar joint range of motion intact [de-identified] : Status post left ankle soft tissue mass excision.  Right ankle incision dehiscence with open wound down to skin, subcutaneous tissue, fat, granular tissue [de-identified] : Light touch sensation intact bilaterally [FreeTextEntry1] : Right foot, S/P surgical debridement 8/20/24     [FreeTextEntry2] : 3.6 [FreeTextEntry3] : 2.1 [FreeTextEntry4] : 0.2-0.3 [de-identified] : Serous/sanguinous [de-identified] : Mild [de-identified] : 20% [de-identified] : COBAN.  Post compression placement assessment: -circulation WNL -patient states full comfort and full ROM -two fingers can slide in when assessed [de-identified] : Juany [de-identified] : Mechanically cleansed with sterile gauze and normal saline. Kerlix  [TWNoteComboBox4] : Small [TWNoteComboBox5] : No [TWNoteComboBox6] : Surgical [de-identified] : No [de-identified] : Macerated [de-identified] : None [de-identified] : None [de-identified] : >75% [de-identified] : No [de-identified] : Multilayer other compression wrap [de-identified] : Every other day [de-identified] : Primary Dressing

## 2024-09-07 ENCOUNTER — OUTPATIENT (OUTPATIENT)
Dept: OUTPATIENT SERVICES | Facility: HOSPITAL | Age: 59
LOS: 1 days | Discharge: HOME | End: 2024-09-07
Payer: COMMERCIAL

## 2024-09-07 ENCOUNTER — APPOINTMENT (OUTPATIENT)
Dept: WOUND CARE | Facility: HOSPITAL | Age: 59
End: 2024-09-07
Payer: COMMERCIAL

## 2024-09-07 VITALS
TEMPERATURE: 98.8 F | DIASTOLIC BLOOD PRESSURE: 73 MMHG | SYSTOLIC BLOOD PRESSURE: 119 MMHG | RESPIRATION RATE: 18 BRPM | HEART RATE: 70 BPM | OXYGEN SATURATION: 95 %
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
## 2024-09-07 DIAGNOSIS — Z98.891 HISTORY OF UTERINE SCAR FROM PREVIOUS SURGERY: Chronic | ICD-10-CM

## 2024-09-07 DIAGNOSIS — Z41.9 ENCOUNTER FOR PROCEDURE FOR PURPOSES OTHER THAN REMEDYING HEALTH STATE, UNSPECIFIED: Chronic | ICD-10-CM
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
## 2024-09-07 DIAGNOSIS — Z98.890 OTHER SPECIFIED POSTPROCEDURAL STATES: Chronic | ICD-10-CM

## 2024-09-07 DIAGNOSIS — Z96.642 PRESENCE OF LEFT ARTIFICIAL HIP JOINT: Chronic | ICD-10-CM

## 2024-09-07 DIAGNOSIS — T81.31XD DISRUPTION OF EXTERNAL OPERATION (SURGICAL) WOUND, NOT ELSEWHERE CLASSIFIED, SUBSEQUENT ENCOUNTER: ICD-10-CM

## 2024-09-07 DIAGNOSIS — L97.312 NON-PRESSURE CHRONIC ULCER OF RIGHT ANKLE WITH FAT LAYER EXPOSED: ICD-10-CM

## 2024-09-07 PROCEDURE — 29581 APPL MULTLAYER CMPRN SYS LEG: CPT | Mod: RT

## 2024-09-07 PROCEDURE — ZZZZZ: CPT
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
## 2024-09-08 DIAGNOSIS — Z82.49 FAMILY HISTORY OF ISCHEMIC HEART DISEASE AND OTHER DISEASES OF THE CIRCULATORY SYSTEM: ICD-10-CM

## 2024-09-08 DIAGNOSIS — Z87.09 PERSONAL HISTORY OF OTHER DISEASES OF THE RESPIRATORY SYSTEM: ICD-10-CM

## 2024-09-08 DIAGNOSIS — T81.89XD OTHER COMPLICATIONS OF PROCEDURES, NOT ELSEWHERE CLASSIFIED, SUBSEQUENT ENCOUNTER: ICD-10-CM

## 2024-09-08 DIAGNOSIS — L97.312 NON-PRESSURE CHRONIC ULCER OF RIGHT ANKLE WITH FAT LAYER EXPOSED: ICD-10-CM

## 2024-09-08 DIAGNOSIS — I73.00 RAYNAUD'S SYNDROME WITHOUT GANGRENE: ICD-10-CM

## 2024-09-08 DIAGNOSIS — E03.9 HYPOTHYROIDISM, UNSPECIFIED: ICD-10-CM

## 2024-09-08 DIAGNOSIS — Z80.8 FAMILY HISTORY OF MALIGNANT NEOPLASM OF OTHER ORGANS OR SYSTEMS: ICD-10-CM

## 2024-09-08 DIAGNOSIS — G47.33 OBSTRUCTIVE SLEEP APNEA (ADULT) (PEDIATRIC): ICD-10-CM

## 2024-09-08 DIAGNOSIS — Z98.890 OTHER SPECIFIED POSTPROCEDURAL STATES: ICD-10-CM
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
## 2024-09-08 DIAGNOSIS — I10 ESSENTIAL (PRIMARY) HYPERTENSION: ICD-10-CM

## 2024-09-08 DIAGNOSIS — Z86.16 PERSONAL HISTORY OF COVID-19: ICD-10-CM

## 2024-09-08 DIAGNOSIS — Z96.649 PRESENCE OF UNSPECIFIED ARTIFICIAL HIP JOINT: ICD-10-CM

## 2024-09-08 DIAGNOSIS — Z83.3 FAMILY HISTORY OF DIABETES MELLITUS: ICD-10-CM

## 2024-09-08 DIAGNOSIS — Z83.511 FAMILY HISTORY OF GLAUCOMA: ICD-10-CM

## 2024-09-08 DIAGNOSIS — Z98.84 BARIATRIC SURGERY STATUS: ICD-10-CM

## 2024-09-09 ENCOUNTER — APPOINTMENT (OUTPATIENT)
Dept: WOUND CARE | Facility: HOSPITAL | Age: 59
End: 2024-09-09

## 2024-09-09 ENCOUNTER — OUTPATIENT (OUTPATIENT)
Dept: OUTPATIENT SERVICES | Facility: HOSPITAL | Age: 59
LOS: 1 days | Discharge: HOME | End: 2024-09-09
Payer: COMMERCIAL

## 2024-09-09 VITALS
DIASTOLIC BLOOD PRESSURE: 76 MMHG | HEIGHT: 62 IN | BODY MASS INDEX: 40.48 KG/M2 | SYSTOLIC BLOOD PRESSURE: 113 MMHG | WEIGHT: 220 LBS | RESPIRATION RATE: 18 BRPM | HEART RATE: 78 BPM | TEMPERATURE: 97.9 F | OXYGEN SATURATION: 97 %

## 2024-09-09 DIAGNOSIS — Z41.9 ENCOUNTER FOR PROCEDURE FOR PURPOSES OTHER THAN REMEDYING HEALTH STATE, UNSPECIFIED: Chronic | ICD-10-CM

## 2024-09-09 DIAGNOSIS — Z98.891 HISTORY OF UTERINE SCAR FROM PREVIOUS SURGERY: Chronic | ICD-10-CM

## 2024-09-09 DIAGNOSIS — Z98.890 OTHER SPECIFIED POSTPROCEDURAL STATES: Chronic | ICD-10-CM

## 2024-09-09 DIAGNOSIS — Z96.642 PRESENCE OF LEFT ARTIFICIAL HIP JOINT: Chronic | ICD-10-CM

## 2024-09-09 DIAGNOSIS — Z90.3 ACQUIRED ABSENCE OF STOMACH [PART OF]: Chronic | ICD-10-CM

## 2024-09-09 DIAGNOSIS — T81.31XD DISRUPTION OF EXTERNAL OPERATION (SURGICAL) WOUND, NOT ELSEWHERE CLASSIFIED, SUBSEQUENT ENCOUNTER: ICD-10-CM

## 2024-09-09 PROCEDURE — ZZZZZ: CPT

## 2024-09-09 PROCEDURE — 29581 APPL MULTLAYER CMPRN SYS LEG: CPT | Mod: RT

## 2024-09-11 ENCOUNTER — APPOINTMENT (OUTPATIENT)
Dept: WOUND CARE | Facility: HOSPITAL | Age: 59
End: 2024-09-11

## 2024-09-11 ENCOUNTER — OUTPATIENT (OUTPATIENT)
Dept: OUTPATIENT SERVICES | Facility: HOSPITAL | Age: 59
LOS: 1 days | Discharge: HOME | End: 2024-09-11
Payer: COMMERCIAL

## 2024-09-11 VITALS
RESPIRATION RATE: 18 BRPM | SYSTOLIC BLOOD PRESSURE: 122 MMHG | TEMPERATURE: 97.8 F | WEIGHT: 220 LBS | BODY MASS INDEX: 40.48 KG/M2 | HEIGHT: 62 IN | HEART RATE: 73 BPM | DIASTOLIC BLOOD PRESSURE: 77 MMHG | OXYGEN SATURATION: 97 %

## 2024-09-11 DIAGNOSIS — Z98.891 HISTORY OF UTERINE SCAR FROM PREVIOUS SURGERY: Chronic | ICD-10-CM

## 2024-09-11 DIAGNOSIS — Z98.890 OTHER SPECIFIED POSTPROCEDURAL STATES: Chronic | ICD-10-CM

## 2024-09-11 DIAGNOSIS — Z90.3 ACQUIRED ABSENCE OF STOMACH [PART OF]: Chronic | ICD-10-CM

## 2024-09-11 DIAGNOSIS — T81.31XD DISRUPTION OF EXTERNAL OPERATION (SURGICAL) WOUND, NOT ELSEWHERE CLASSIFIED, SUBSEQUENT ENCOUNTER: ICD-10-CM

## 2024-09-11 PROCEDURE — 29581 APPL MULTLAYER CMPRN SYS LEG: CPT | Mod: RT

## 2024-09-11 PROCEDURE — ZZZZZ: CPT

## 2024-09-12 ENCOUNTER — APPOINTMENT (OUTPATIENT)
Dept: PODIATRY | Facility: CLINIC | Age: 59
End: 2024-09-12
Payer: COMMERCIAL

## 2024-09-12 DIAGNOSIS — L97.312 NON-PRESSURE CHRONIC ULCER OF RIGHT ANKLE WITH FAT LAYER EXPOSED: ICD-10-CM
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
## 2024-09-12 DIAGNOSIS — B35.1 TINEA UNGUIUM: ICD-10-CM

## 2024-09-12 DIAGNOSIS — T81.31XD DISRUPTION OF EXTERNAL OPERATION (SURGICAL) WOUND, NOT ELSEWHERE CLASSIFIED, SUBSEQUENT ENCOUNTER: ICD-10-CM

## 2024-09-12 PROCEDURE — 99212 OFFICE O/P EST SF 10 MIN: CPT

## 2024-09-13 ENCOUNTER — NON-APPOINTMENT (OUTPATIENT)
Age: 59
End: 2024-09-13

## 2024-09-13 ENCOUNTER — OUTPATIENT (OUTPATIENT)
Dept: OUTPATIENT SERVICES | Facility: HOSPITAL | Age: 59
LOS: 1 days | Discharge: HOME | End: 2024-09-13
Payer: COMMERCIAL

## 2024-09-13 ENCOUNTER — APPOINTMENT (OUTPATIENT)
Dept: WOUND CARE | Facility: HOSPITAL | Age: 59
End: 2024-09-13
Payer: COMMERCIAL

## 2024-09-13 VITALS
HEIGHT: 62 IN | SYSTOLIC BLOOD PRESSURE: 105 MMHG | DIASTOLIC BLOOD PRESSURE: 71 MMHG | RESPIRATION RATE: 18 BRPM | HEART RATE: 68 BPM | TEMPERATURE: 98.3 F | WEIGHT: 220 LBS | OXYGEN SATURATION: 97 % | BODY MASS INDEX: 40.48 KG/M2

## 2024-09-13 VITALS
WEIGHT: 220 LBS | OXYGEN SATURATION: 96 % | HEIGHT: 62 IN | DIASTOLIC BLOOD PRESSURE: 77 MMHG | SYSTOLIC BLOOD PRESSURE: 117 MMHG | BODY MASS INDEX: 40.48 KG/M2 | TEMPERATURE: 98.1 F | RESPIRATION RATE: 16 BRPM | HEART RATE: 67 BPM

## 2024-09-13 DIAGNOSIS — T81.89XD OTHER COMPLICATIONS OF PROCEDURES, NOT ELSEWHERE CLASSIFIED, SUBSEQUENT ENCOUNTER: ICD-10-CM

## 2024-09-13 DIAGNOSIS — Z83.511 FAMILY HISTORY OF GLAUCOMA: ICD-10-CM

## 2024-09-13 DIAGNOSIS — Z96.649 PRESENCE OF UNSPECIFIED ARTIFICIAL HIP JOINT: ICD-10-CM

## 2024-09-13 DIAGNOSIS — Z98.891 HISTORY OF UTERINE SCAR FROM PREVIOUS SURGERY: Chronic | ICD-10-CM

## 2024-09-13 DIAGNOSIS — T81.31XD DISRUPTION OF EXTERNAL OPERATION (SURGICAL) WOUND, NOT ELSEWHERE CLASSIFIED, SUBSEQUENT ENCOUNTER: ICD-10-CM

## 2024-09-13 DIAGNOSIS — Z83.3 FAMILY HISTORY OF DIABETES MELLITUS: ICD-10-CM

## 2024-09-13 DIAGNOSIS — Z96.642 PRESENCE OF LEFT ARTIFICIAL HIP JOINT: Chronic | ICD-10-CM

## 2024-09-13 DIAGNOSIS — L97.312 NON-PRESSURE CHRONIC ULCER OF RIGHT ANKLE WITH FAT LAYER EXPOSED: ICD-10-CM

## 2024-09-13 DIAGNOSIS — Z98.890 OTHER SPECIFIED POSTPROCEDURAL STATES: Chronic | ICD-10-CM

## 2024-09-13 DIAGNOSIS — E03.9 HYPOTHYROIDISM, UNSPECIFIED: ICD-10-CM

## 2024-09-13 DIAGNOSIS — Z86.16 PERSONAL HISTORY OF COVID-19: ICD-10-CM
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
## 2024-09-13 DIAGNOSIS — G47.33 OBSTRUCTIVE SLEEP APNEA (ADULT) (PEDIATRIC): ICD-10-CM

## 2024-09-13 DIAGNOSIS — I73.00 RAYNAUD'S SYNDROME WITHOUT GANGRENE: ICD-10-CM

## 2024-09-13 DIAGNOSIS — Z82.49 FAMILY HISTORY OF ISCHEMIC HEART DISEASE AND OTHER DISEASES OF THE CIRCULATORY SYSTEM: ICD-10-CM

## 2024-09-13 DIAGNOSIS — Z41.9 ENCOUNTER FOR PROCEDURE FOR PURPOSES OTHER THAN REMEDYING HEALTH STATE, UNSPECIFIED: Chronic | ICD-10-CM

## 2024-09-13 DIAGNOSIS — Z80.8 FAMILY HISTORY OF MALIGNANT NEOPLASM OF OTHER ORGANS OR SYSTEMS: ICD-10-CM

## 2024-09-13 DIAGNOSIS — Z98.84 BARIATRIC SURGERY STATUS: ICD-10-CM
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
## 2024-09-13 DIAGNOSIS — I10 ESSENTIAL (PRIMARY) HYPERTENSION: ICD-10-CM

## 2024-09-13 DIAGNOSIS — Z98.890 OTHER SPECIFIED POSTPROCEDURAL STATES: ICD-10-CM

## 2024-09-13 DIAGNOSIS — Z90.3 ACQUIRED ABSENCE OF STOMACH [PART OF]: Chronic | ICD-10-CM

## 2024-09-13 PROCEDURE — 99213 OFFICE O/P EST LOW 20 MIN: CPT

## 2024-09-13 PROCEDURE — G0463: CPT
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
## 2024-09-14 DIAGNOSIS — L97.312 NON-PRESSURE CHRONIC ULCER OF RIGHT ANKLE WITH FAT LAYER EXPOSED: ICD-10-CM
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
## 2024-09-14 DIAGNOSIS — T81.31XD DISRUPTION OF EXTERNAL OPERATION (SURGICAL) WOUND, NOT ELSEWHERE CLASSIFIED, SUBSEQUENT ENCOUNTER: ICD-10-CM

## 2024-09-14 NOTE — ASSESSMENT
[Verbal] : Verbal [Demo] : Demo [Patient] : Patient [Good - alert, interested, motivated] : Good - alert, interested, motivated [Verbalizes knowledge/Understanding] : Verbalizes knowledge/understanding [Dressing changes] : dressing changes [Skin Care] : skin care [Pressure relief] : pressure relief [Signs and symptoms of infection] : sign and symptoms of infection [Nutrition] : nutrition [How and When to Call] : how and when to call [Pain Management] : pain management [Off-loading] : off-loading [Compression Therapy] : compression therapy [Patient responsibility to plan of care] : patient responsibility to plan of care [] : Yes [Stable] : stable [Home] : Home [Not Applicable - Long Term Care/Home Health Agency] : Long Term Care/Home Health Agency: Not Applicable [Cane] : Cane [FreeTextEntry2] : Infection prevention Localized wound care Promote optimal skin integrity  Maintain acceptable level of pain with use of pharmacological and nonpharmacological interventions Offloading / Pressure relief  Collagen Matrix Edema control: Low sodium diet, elevation, and compression  [FreeTextEntry4] : Follow up for an assessment in one week Patient to return to the [facility] for bandage changes.  No supplies needed at this time. Discussed risks involved in traveling with an open wound, patient and spouse verbalized understanding.

## 2024-09-14 NOTE — PHYSICAL EXAM
[4 x 4] : 4 x 4  [Abdominal Pad] : Abdominal Pad [2+] : left 2+ [Ankle Swelling (On Exam)] : not present [Varicose Veins Of Lower Extremities] : not present [] : not present [de-identified] : A&Ox3, NAD [de-identified] : 5 out of 5 strength in all quadrants bilaterally, ankle joint and subtalar joint range of motion intact [de-identified] : Status post left ankle soft tissue mass excision. Right ankle incision dehiscence with open wound down to skin, subcutaneous tissue, fat, granular tissue - noted with progress of healing  [de-identified] : Light touch sensation intact bilaterally [FreeTextEntry1] : Right foot, S/P surgical debridement 8/20/24     [FreeTextEntry2] : 3.5 [FreeTextEntry3] : 2.2 [FreeTextEntry4] : 0.2-0.3 [de-identified] : Serous/sanguinous [de-identified] : Mild [de-identified] : 20% [de-identified] : Patient denies any pain or discomfort post Coban application.  Neruo/Circ is WNL.  [de-identified] : Juany [de-identified] : Cleansed with 0.9% Normal Saline  Kerlix  [TWNoteComboBox4] : Small [TWNoteComboBox5] : No [TWNoteComboBox6] : Surgical [de-identified] : No [de-identified] : Macerated [de-identified] : None [de-identified] : None [de-identified] : >75% [de-identified] : No [de-identified] : Multilayer other compression wrap [de-identified] : Every other day [de-identified] : Primary Dressing

## 2024-09-14 NOTE — HISTORY OF PRESENT ILLNESS
[FreeTextEntry1] : Patient presents to [facility] with lipoma of bilateral ankles. Right ankle pain secondary to the lipoma, the pain is about a 7/10. The pain is relieved by topical over the counter anesthetic. The lipoma started about a year ago, the pain started hurting about 4-5 months ago. Patient went to podiatrist, referred here for possible surgical intervention after sonongram was performed and lipoma was diagnosed.  9/13/24 patient seen status post right ankle excision of a lipoma (DOS: 7/30/2024), dehiscence with hematoma, and now with open wound down to skin, subcutaneous tissue, fat with recent hospitalization and discharge, finished a course of IV abx and wound VAC. Patient has been visiting the [facility] for dressing changes.

## 2024-09-14 NOTE — ASSESSMENT
[Verbal] : Verbal [Demo] : Demo [Patient] : Patient [Good - alert, interested, motivated] : Good - alert, interested, motivated [Verbalizes knowledge/Understanding] : Verbalizes knowledge/understanding [Dressing changes] : dressing changes [Skin Care] : skin care [Pressure relief] : pressure relief [Signs and symptoms of infection] : sign and symptoms of infection [Nutrition] : nutrition [How and When to Call] : how and when to call [Pain Management] : pain management [Off-loading] : off-loading [Compression Therapy] : compression therapy [Patient responsibility to plan of care] : patient responsibility to plan of care [] : Yes [Stable] : stable [Home] : Home [Not Applicable - Long Term Care/Home Health Agency] : Long Term Care/Home Health Agency: Not Applicable [Cane] : Cane [FreeTextEntry2] : Infection prevention Localized wound care Promote optimal skin integrity  Maintain acceptable level of pain with use of pharmacological and nonpharmacological interventions Offloading / Pressure relief  Collagen Matrix Edema control: Low sodium diet, elevation, and compression  [FreeTextEntry4] : Follow up for an assessment in one week Patient to return to the [facility] for bandage changes.  No supplies needed at this time. Discussed risks involved in traveling with an open wound, patient and spouse verbalized understanding.

## 2024-09-14 NOTE — PHYSICAL EXAM
[4 x 4] : 4 x 4  [Abdominal Pad] : Abdominal Pad [2+] : left 2+ [Ankle Swelling (On Exam)] : not present [Varicose Veins Of Lower Extremities] : not present [] : not present [de-identified] : A&Ox3, NAD [de-identified] : 5 out of 5 strength in all quadrants bilaterally, ankle joint and subtalar joint range of motion intact [de-identified] : Status post left ankle soft tissue mass excision. Right ankle incision dehiscence with open wound down to skin, subcutaneous tissue, fat, granular tissue - noted with progress of healing  [de-identified] : Light touch sensation intact bilaterally [FreeTextEntry1] : Right foot, S/P surgical debridement 8/20/24     [FreeTextEntry2] : 3.5 [FreeTextEntry3] : 2.2 [FreeTextEntry4] : 0.2-0.3 [de-identified] : Serous/sanguinous [de-identified] : Mild [de-identified] : 20% [de-identified] : Patient denies any pain or discomfort post Coban application.  Neruo/Circ is WNL.  [de-identified] : Juany [de-identified] : Cleansed with 0.9% Normal Saline  Kerlix  [TWNoteComboBox4] : Small [TWNoteComboBox5] : No [TWNoteComboBox6] : Surgical [de-identified] : No [de-identified] : Macerated [de-identified] : None [de-identified] : None [de-identified] : >75% [de-identified] : No [de-identified] : Every other day [de-identified] : Multilayer other compression wrap [de-identified] : Primary Dressing

## 2024-09-14 NOTE — REVIEW OF SYSTEMS
[Negative] : Heme/Lymph [de-identified] : painful right ankle soft tissue mass, s/p  excision and dehiscence

## 2024-09-14 NOTE — HISTORY OF PRESENT ILLNESS
[FreeTextEntry1] : Patient presents to [facility] with lipoma of bilateral ankles. Right ankle pain secondary to the lipoma, the pain is about a 7/10. The pain is relieved by topical over the counter anesthetic. The lipoma started about a year ago, the pain started hurting about 4-5 months ago. Patient went to podiatrist, referred here for possible surgical intervention after sonongram was performed and lipoma was diagnosed.  9/13/24 patient seen status post right ankle excision of a lipoma (DOS: 7/30/2024), dehiscence with hematoma, and now with open wound down to skin, subcutaneous tissue, fat with recent hospitalization and discharge, finished a course of IV abx and wound VAC. Patient has been visiting the [facility] for dressing changes.

## 2024-09-14 NOTE — REVIEW OF SYSTEMS
[Negative] : Heme/Lymph [de-identified] : painful right ankle soft tissue mass, s/p  excision and dehiscence

## 2024-09-15 NOTE — PHYSICAL EXAM
[General Appearance - Alert] : alert [General Appearance - In No Acute Distress] : in no acute distress [General Appearance - Well Nourished] : well nourished [Sensation] : the sensory exam was normal to light touch and pinprick [de-identified] : Minimal pain on palpation of lateral ankle in area of lateral malleolus b/l with no discrete or circumscribed mass noted but each area b/l noted to be soft and swollen, feeling consistent with fat deposits, mild discomfort to lateral dorsal proximal feet b/l, just distal to lateral ankle b/l. No pain on ROM of foot and ankle b/l. No structural deformities noted b/l. [FreeTextEntry1] : 3 mycotic toe nails, noted to right 1st toenail, left 1st toenail and left 2nd toenail, brittle, discolored and thickened, each with clinical improvement, with proximal clearing noted. Dry, clean, intact bandage noted to right lower leg- dressing maintained.

## 2024-09-15 NOTE — HISTORY OF PRESENT ILLNESS
[FreeTextEntry1] : This 59 year old female patient returns to office with her  for follow up care of painful toe nails. She states she has not been applying  toe nail medicine much as she had her right ankle lipoma removal surgery about a month ago and she has been focused follow up for healing from that. She states the area has been being treated by the wound care center for slow healing due to a dressing being tight a few weeks post op, and she had a revision surgery done recently to help it heal. She states the toe nails have been bothering her, they are thick and painful and she is going to work on applying the medication daily.

## 2024-09-15 NOTE — ASSESSMENT
[FreeTextEntry1] : Exam. Instructed throatiest to continue to apply the topical antifungal medication to the affected toe nails daily. Mycotic toe nails debrided with sterile nippers and electric dashawn to patient's tolerance. Instructed patient to dry toe nails well, and change damp or wet sock and shoes to dry ones. Dressing to right lower extremity maintained intact- site being cared for by Wound care center, patient to follow up with wound care center as per their instructions. Patient demonstrated verbal understanding of all instructions. Patient to return to office in 1 month.

## 2024-09-15 NOTE — REVIEW OF SYSTEMS
[Negative] : Constitutional [FreeTextEntry9] : back pain at times, pain and swelling to outside of ankles [de-identified] : fungal toe nails

## 2024-09-16 ENCOUNTER — APPOINTMENT (OUTPATIENT)
Dept: WOUND CARE | Facility: HOSPITAL | Age: 59
End: 2024-09-16
Payer: COMMERCIAL

## 2024-09-16 ENCOUNTER — OUTPATIENT (OUTPATIENT)
Dept: OUTPATIENT SERVICES | Facility: HOSPITAL | Age: 59
LOS: 1 days | Discharge: HOME | End: 2024-09-16
Payer: COMMERCIAL

## 2024-09-16 VITALS
DIASTOLIC BLOOD PRESSURE: 79 MMHG | TEMPERATURE: 98.6 F | OXYGEN SATURATION: 98 % | HEIGHT: 62 IN | BODY MASS INDEX: 40.48 KG/M2 | RESPIRATION RATE: 16 BRPM | HEART RATE: 73 BPM | SYSTOLIC BLOOD PRESSURE: 114 MMHG | WEIGHT: 220 LBS

## 2024-09-16 DIAGNOSIS — Z98.890 OTHER SPECIFIED POSTPROCEDURAL STATES: Chronic | ICD-10-CM
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
## 2024-09-16 DIAGNOSIS — T81.89XD OTHER COMPLICATIONS OF PROCEDURES, NOT ELSEWHERE CLASSIFIED, SUBSEQUENT ENCOUNTER: ICD-10-CM

## 2024-09-16 DIAGNOSIS — T81.31XD DISRUPTION OF EXTERNAL OPERATION (SURGICAL) WOUND, NOT ELSEWHERE CLASSIFIED, SUBSEQUENT ENCOUNTER: ICD-10-CM

## 2024-09-16 DIAGNOSIS — Z90.3 ACQUIRED ABSENCE OF STOMACH [PART OF]: Chronic | ICD-10-CM
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
## 2024-09-16 DIAGNOSIS — Z96.642 PRESENCE OF LEFT ARTIFICIAL HIP JOINT: Chronic | ICD-10-CM

## 2024-09-16 DIAGNOSIS — Z98.891 HISTORY OF UTERINE SCAR FROM PREVIOUS SURGERY: Chronic | ICD-10-CM

## 2024-09-16 DIAGNOSIS — L97.312 NON-PRESSURE CHRONIC ULCER OF RIGHT ANKLE WITH FAT LAYER EXPOSED: ICD-10-CM

## 2024-09-16 PROCEDURE — 29581 APPL MULTLAYER CMPRN SYS LEG: CPT | Mod: RT

## 2024-09-16 PROCEDURE — ZZZZZ: CPT

## 2024-09-18 ENCOUNTER — APPOINTMENT (OUTPATIENT)
Dept: WOUND CARE | Facility: HOSPITAL | Age: 59
End: 2024-09-18
Payer: COMMERCIAL

## 2024-09-18 ENCOUNTER — OUTPATIENT (OUTPATIENT)
Dept: OUTPATIENT SERVICES | Facility: HOSPITAL | Age: 59
LOS: 1 days | Discharge: HOME | End: 2024-09-18
Payer: COMMERCIAL

## 2024-09-18 VITALS
OXYGEN SATURATION: 99 % | DIASTOLIC BLOOD PRESSURE: 77 MMHG | HEIGHT: 62 IN | WEIGHT: 220 LBS | RESPIRATION RATE: 16 BRPM | TEMPERATURE: 98.6 F | BODY MASS INDEX: 40.48 KG/M2 | HEART RATE: 69 BPM | SYSTOLIC BLOOD PRESSURE: 119 MMHG

## 2024-09-18 DIAGNOSIS — Z41.9 ENCOUNTER FOR PROCEDURE FOR PURPOSES OTHER THAN REMEDYING HEALTH STATE, UNSPECIFIED: Chronic | ICD-10-CM

## 2024-09-18 DIAGNOSIS — T81.31XD DISRUPTION OF EXTERNAL OPERATION (SURGICAL) WOUND, NOT ELSEWHERE CLASSIFIED, SUBSEQUENT ENCOUNTER: ICD-10-CM

## 2024-09-18 DIAGNOSIS — Z96.642 PRESENCE OF LEFT ARTIFICIAL HIP JOINT: Chronic | ICD-10-CM

## 2024-09-18 DIAGNOSIS — Z98.890 OTHER SPECIFIED POSTPROCEDURAL STATES: Chronic | ICD-10-CM

## 2024-09-18 PROCEDURE — ZZZZZ: CPT

## 2024-09-18 PROCEDURE — 29581 APPL MULTLAYER CMPRN SYS LEG: CPT | Mod: RT
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
## 2024-09-19 DIAGNOSIS — L97.312 NON-PRESSURE CHRONIC ULCER OF RIGHT ANKLE WITH FAT LAYER EXPOSED: ICD-10-CM
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
## 2024-09-19 DIAGNOSIS — T81.89XD OTHER COMPLICATIONS OF PROCEDURES, NOT ELSEWHERE CLASSIFIED, SUBSEQUENT ENCOUNTER: ICD-10-CM

## 2024-09-20 ENCOUNTER — APPOINTMENT (OUTPATIENT)
Dept: WOUND CARE | Facility: HOSPITAL | Age: 59
End: 2024-09-20
Payer: COMMERCIAL

## 2024-09-20 ENCOUNTER — OUTPATIENT (OUTPATIENT)
Dept: OUTPATIENT SERVICES | Facility: HOSPITAL | Age: 59
LOS: 1 days | Discharge: HOME | End: 2024-09-20
Payer: COMMERCIAL

## 2024-09-20 VITALS
TEMPERATURE: 98.2 F | BODY MASS INDEX: 40.48 KG/M2 | HEART RATE: 68 BPM | HEIGHT: 62 IN | RESPIRATION RATE: 16 BRPM | OXYGEN SATURATION: 96 % | DIASTOLIC BLOOD PRESSURE: 71 MMHG | WEIGHT: 220 LBS | SYSTOLIC BLOOD PRESSURE: 108 MMHG

## 2024-09-20 DIAGNOSIS — Z90.3 ACQUIRED ABSENCE OF STOMACH [PART OF]: Chronic | ICD-10-CM

## 2024-09-20 DIAGNOSIS — Z98.890 OTHER SPECIFIED POSTPROCEDURAL STATES: Chronic | ICD-10-CM

## 2024-09-20 DIAGNOSIS — Z96.642 PRESENCE OF LEFT ARTIFICIAL HIP JOINT: Chronic | ICD-10-CM

## 2024-09-20 DIAGNOSIS — Z98.891 HISTORY OF UTERINE SCAR FROM PREVIOUS SURGERY: Chronic | ICD-10-CM

## 2024-09-20 DIAGNOSIS — Z41.9 ENCOUNTER FOR PROCEDURE FOR PURPOSES OTHER THAN REMEDYING HEALTH STATE, UNSPECIFIED: Chronic | ICD-10-CM

## 2024-09-20 DIAGNOSIS — T81.31XD DISRUPTION OF EXTERNAL OPERATION (SURGICAL) WOUND, NOT ELSEWHERE CLASSIFIED, SUBSEQUENT ENCOUNTER: ICD-10-CM

## 2024-09-20 PROCEDURE — 99213 OFFICE O/P EST LOW 20 MIN: CPT

## 2024-09-20 PROCEDURE — G0463: CPT

## 2024-09-22 NOTE — ED ADULT NURSE NOTE - CAS DISCH BELONGINGS RETURNED
Supportive Care measures for musculoskeletal pain related to injury   - Alternate ice / heat as tolerated   - Drink plenty of water (approx. 4-6 bottle equivalents per day)   - Naproxen, Ibuprofen, or Tylenol for pain as needed   - You may benefit from over-the-counter topical pain medication such as: Diclofenac (Voltaren), Icy/Hot, Biofreeze, Bengay, etc.   - You may benefit from massage, acupuncture, myofascial scraping, and/or dry needling   - Avoid excessive twisting / bending / lifting   - Avoid looking down (e.g. texting) as this worsens strain   - Use proper body posture (sitting up straight, shoulder back, etc.)   - Expect symptoms to start improving over next few days    
Not applicable

## 2024-09-23 ENCOUNTER — APPOINTMENT (OUTPATIENT)
Dept: WOUND CARE | Facility: HOSPITAL | Age: 59
End: 2024-09-23
Payer: COMMERCIAL

## 2024-09-23 ENCOUNTER — OUTPATIENT (OUTPATIENT)
Dept: OUTPATIENT SERVICES | Facility: HOSPITAL | Age: 59
LOS: 1 days | Discharge: HOME | End: 2024-09-23
Payer: COMMERCIAL

## 2024-09-23 VITALS
RESPIRATION RATE: 18 BRPM | BODY MASS INDEX: 40.48 KG/M2 | WEIGHT: 220 LBS | HEART RATE: 77 BPM | TEMPERATURE: 98.1 F | SYSTOLIC BLOOD PRESSURE: 107 MMHG | HEIGHT: 62 IN | OXYGEN SATURATION: 97 % | DIASTOLIC BLOOD PRESSURE: 72 MMHG

## 2024-09-23 DIAGNOSIS — T81.31XD DISRUPTION OF EXTERNAL OPERATION (SURGICAL) WOUND, NOT ELSEWHERE CLASSIFIED, SUBSEQUENT ENCOUNTER: ICD-10-CM

## 2024-09-23 DIAGNOSIS — Z98.891 HISTORY OF UTERINE SCAR FROM PREVIOUS SURGERY: Chronic | ICD-10-CM

## 2024-09-23 DIAGNOSIS — Z96.642 PRESENCE OF LEFT ARTIFICIAL HIP JOINT: Chronic | ICD-10-CM

## 2024-09-23 DIAGNOSIS — Z98.890 OTHER SPECIFIED POSTPROCEDURAL STATES: Chronic | ICD-10-CM

## 2024-09-23 PROCEDURE — ZZZZZ: CPT

## 2024-09-23 PROCEDURE — 29581 APPL MULTLAYER CMPRN SYS LEG: CPT | Mod: RT

## 2024-09-24 DIAGNOSIS — T81.89XD OTHER COMPLICATIONS OF PROCEDURES, NOT ELSEWHERE CLASSIFIED, SUBSEQUENT ENCOUNTER: ICD-10-CM
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
## 2024-09-24 DIAGNOSIS — L97.312 NON-PRESSURE CHRONIC ULCER OF RIGHT ANKLE WITH FAT LAYER EXPOSED: ICD-10-CM
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
## 2024-09-25 ENCOUNTER — APPOINTMENT (OUTPATIENT)
Dept: WOUND CARE | Facility: HOSPITAL | Age: 59
End: 2024-09-25
Payer: COMMERCIAL

## 2024-09-25 ENCOUNTER — OUTPATIENT (OUTPATIENT)
Dept: OUTPATIENT SERVICES | Facility: HOSPITAL | Age: 59
LOS: 1 days | Discharge: HOME | End: 2024-09-25
Payer: COMMERCIAL

## 2024-09-25 VITALS
DIASTOLIC BLOOD PRESSURE: 77 MMHG | WEIGHT: 220 LBS | OXYGEN SATURATION: 95 % | HEART RATE: 80 BPM | RESPIRATION RATE: 18 BRPM | HEIGHT: 62 IN | SYSTOLIC BLOOD PRESSURE: 117 MMHG | TEMPERATURE: 98.2 F | BODY MASS INDEX: 40.48 KG/M2
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
## 2024-09-25 DIAGNOSIS — T81.31XD DISRUPTION OF EXTERNAL OPERATION (SURGICAL) WOUND, NOT ELSEWHERE CLASSIFIED, SUBSEQUENT ENCOUNTER: ICD-10-CM

## 2024-09-25 DIAGNOSIS — Z80.8 FAMILY HISTORY OF MALIGNANT NEOPLASM OF OTHER ORGANS OR SYSTEMS: ICD-10-CM

## 2024-09-25 DIAGNOSIS — L97.312 NON-PRESSURE CHRONIC ULCER OF RIGHT ANKLE WITH FAT LAYER EXPOSED: ICD-10-CM

## 2024-09-25 DIAGNOSIS — I10 ESSENTIAL (PRIMARY) HYPERTENSION: ICD-10-CM

## 2024-09-25 DIAGNOSIS — Z96.649 PRESENCE OF UNSPECIFIED ARTIFICIAL HIP JOINT: ICD-10-CM

## 2024-09-25 DIAGNOSIS — E03.9 HYPOTHYROIDISM, UNSPECIFIED: ICD-10-CM

## 2024-09-25 DIAGNOSIS — Z83.3 FAMILY HISTORY OF DIABETES MELLITUS: ICD-10-CM

## 2024-09-25 DIAGNOSIS — Z82.49 FAMILY HISTORY OF ISCHEMIC HEART DISEASE AND OTHER DISEASES OF THE CIRCULATORY SYSTEM: ICD-10-CM

## 2024-09-25 DIAGNOSIS — Z98.84 BARIATRIC SURGERY STATUS: ICD-10-CM
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
## 2024-09-25 DIAGNOSIS — Z98.890 OTHER SPECIFIED POSTPROCEDURAL STATES: ICD-10-CM

## 2024-09-25 DIAGNOSIS — I73.00 RAYNAUD'S SYNDROME WITHOUT GANGRENE: ICD-10-CM

## 2024-09-25 DIAGNOSIS — T81.89XD OTHER COMPLICATIONS OF PROCEDURES, NOT ELSEWHERE CLASSIFIED, SUBSEQUENT ENCOUNTER: ICD-10-CM

## 2024-09-25 DIAGNOSIS — Z98.890 OTHER SPECIFIED POSTPROCEDURAL STATES: Chronic | ICD-10-CM

## 2024-09-25 DIAGNOSIS — Z86.16 PERSONAL HISTORY OF COVID-19: ICD-10-CM

## 2024-09-25 DIAGNOSIS — G47.33 OBSTRUCTIVE SLEEP APNEA (ADULT) (PEDIATRIC): ICD-10-CM

## 2024-09-25 DIAGNOSIS — Z83.511 FAMILY HISTORY OF GLAUCOMA: ICD-10-CM

## 2024-09-25 PROCEDURE — 99024 POSTOP FOLLOW-UP VISIT: CPT

## 2024-09-25 PROCEDURE — 29581 APPL MULTLAYER CMPRN SYS LEG: CPT | Mod: RT

## 2024-09-25 NOTE — ASSESSMENT
[Verbal] : Verbal [Demo] : Demo [Patient] : Patient [Good - alert, interested, motivated] : Good - alert, interested, motivated [Verbalizes knowledge/Understanding] : Verbalizes knowledge/understanding [Dressing changes] : dressing changes [Skin Care] : skin care [Pressure relief] : pressure relief [Signs and symptoms of infection] : sign and symptoms of infection [Nutrition] : nutrition [How and When to Call] : how and when to call [Pain Management] : pain management [Off-loading] : off-loading [Compression Therapy] : compression therapy [Patient responsibility to plan of care] : patient responsibility to plan of care [] : Yes [Stable] : stable [Home] : Home [Cane] : Cane [Not Applicable - Long Term Care/Home Health Agency] : Long Term Care/Home Health Agency: Not Applicable [FreeTextEntry2] : Infection prevention s/s of infection Localized wound care Promote optimal skin integrity  Maintain acceptable level of pain with use of pharmacological and nonpharmacological interventions Offloading / Pressure relief  Collagen Matrix Edema control: Low sodium diet, elevation, and compression  [FreeTextEntry4] : Follow up for an assessment in one week Patient to return to the [facility] for bandage changes.  No supplies needed at this time. Discussed risks involved in traveling with an open wound, patient and spouse verbalized understanding.   F/U 2x week for dressing change and 1 week for assess

## 2024-09-25 NOTE — VITALS
[Pain related to present condition?] : The patient's  pain is related to present condition. [] : No [de-identified] : 2/10 [FreeTextEntry3] : Right leg

## 2024-09-25 NOTE — ASSESSMENT
[Stable] : stable [Home] : Home [Cane] : Cane [Not Applicable - Long Term Care/Home Health Agency] : Long Term Care/Home Health Agency: Not Applicable [FreeTextEntry2] : Infection prevention  Wound care (dressing changes) Maintain optimal skin integrity to high pressure areas Compression therapy Nutrition and wound healing Elevation and low sodium compliance. Pressure relief/ Pressure redistribution Offloading the stress on skin structures and decreasing potential pathologic biomechanical influences. [FreeTextEntry4] : F/U 2 X Weekly for wound dressing changes F/U 1 Week for assessment

## 2024-09-25 NOTE — VITALS
[Pain related to present condition?] : The patient's  pain is related to present condition. [] : No [de-identified] : 2/10 [FreeTextEntry3] : Right leg

## 2024-09-25 NOTE — HISTORY OF PRESENT ILLNESS
[FreeTextEntry1] : Patient presents to [facility] with lipoma of bilateral ankles. Right ankle pain secondary to the lipoma, the pain is about a 7/10. The pain is relieved by topical over the counter anesthetic. The lipoma started about a year ago, the pain started hurting about 4-5 months ago. Patient went to podiatrist, referred here for possible surgical intervention after sonongram was performed and lipoma was diagnosed.  9/20/24 patient seen status post right ankle excision of a lipoma (DOS: 7/30/2024), dehiscence with hematoma, and now with open wound down to skin, subcutaneous tissue, fat- noted with healing progress.

## 2024-09-25 NOTE — REVIEW OF SYSTEMS
[Negative] : Heme/Lymph [de-identified] : painful right ankle soft tissue mass, s/p  excision and dehiscence

## 2024-09-25 NOTE — REVIEW OF SYSTEMS
[Negative] : Heme/Lymph [de-identified] : painful right ankle soft tissue mass, s/p  excision and dehiscence

## 2024-09-25 NOTE — PHYSICAL EXAM
[2+] : left 2+ [Ankle Swelling (On Exam)] : not present [Varicose Veins Of Lower Extremities] : not present [] : not present [de-identified] : A&Ox3, NAD [de-identified] : 5 out of 5 strength in all quadrants bilaterally, ankle joint and subtalar joint range of motion intact [de-identified] : Status post left ankle soft tissue mass excision. Right ankle incision dehiscence with open wound down to skin, subcutaneous tissue, fat, granular tissue - noted with progress of healing  [de-identified] : Light touch sensation intact bilaterally [de-identified] : status post right ankle excision of a lipoma (DOS: 7/30/2024) [FreeTextEntry1] : Right Ankle, Lateral [FreeTextEntry2] : 1.6 [FreeTextEntry3] : 1.2 [FreeTextEntry4] : 0.1 [de-identified] : small serosanguineous [de-identified] : none [de-identified] : surgical [de-identified] : none [de-identified] : intact [de-identified] : none [de-identified] : none [de-identified] : 100%` [de-identified] : none [de-identified] : Pt expressed comfort post COBAN application. Circ/Neuromuscular functions WNL post application. [de-identified] : Juany [de-identified] : Mechanically cleansed with sterile gauze and normal saline 0.9% Dry Dressing [de-identified] : No [de-identified] : Multilayer other compression wrap [de-identified] : 3x Weekly [de-identified] : Primary Dressing

## 2024-09-25 NOTE — ASSESSMENT
[Verbal] : Verbal [Demo] : Demo [Patient] : Patient [Good - alert, interested, motivated] : Good - alert, interested, motivated [Verbalizes knowledge/Understanding] : Verbalizes knowledge/understanding [Dressing changes] : dressing changes [Skin Care] : skin care [Pressure relief] : pressure relief [Signs and symptoms of infection] : sign and symptoms of infection [Nutrition] : nutrition [How and When to Call] : how and when to call [Pain Management] : pain management [Off-loading] : off-loading [Compression Therapy] : compression therapy [Patient responsibility to plan of care] : patient responsibility to plan of care [] : Yes [Stable] : stable [Home] : Home [Cane] : Cane [Not Applicable - Long Term Care/Home Health Agency] : Long Term Care/Home Health Agency: Not Applicable [FreeTextEntry2] : Infection prevention s/s of infection Localized wound care Promote optimal skin integrity  Maintain acceptable level of pain with use of pharmacological and nonpharmacological interventions Offloading / Pressure relief  Collagen Matrix Edema control: Low sodium diet, elevation, and compression  [FreeTextEntry4] : Follow up for an assessment in one week Patient to return to the [facility] for bandage changes.  No supplies needed at this time. Discussed risks involved in traveling with an open wound, patient and spouse verbalized understanding.   F/U 2x week for dressing change and 1 week for assess

## 2024-09-25 NOTE — PHYSICAL EXAM
[4 x 4] : 4 x 4  [Abdominal Pad] : Abdominal Pad [2+] : left 2+ [Ankle Swelling (On Exam)] : not present [Varicose Veins Of Lower Extremities] : not present [] : not present [de-identified] : A&Ox3, NAD [de-identified] : 5 out of 5 strength in all quadrants bilaterally, ankle joint and subtalar joint range of motion intact [de-identified] : Status post left ankle soft tissue mass excision. Right ankle incision dehiscence with open wound down to skin, subcutaneous tissue, fat, granular tissue - noted with progress of healing  [de-identified] : Light touch sensation intact bilaterally [FreeTextEntry1] : Right foot, S/P surgical debridement 8/20/24     [FreeTextEntry2] : 2.4 [FreeTextEntry3] : 1.6 [FreeTextEntry4] : 0.1-0.2 [de-identified] : Serous/sanguinous [de-identified] : 20% [de-identified] : Patient denies any pain or discomfort post Coban application.  Circulatory and Neuromuscular status is WNL.  [de-identified] : Juany [de-identified] : Cleansed with 0.9% Normal Saline  Kerlix Coban regular  [TWNoteComboBox4] : Small [TWNoteComboBox5] : No [TWNoteComboBox6] : Surgical [de-identified] : No [de-identified] : False [de-identified] : None [de-identified] : None [de-identified] : >75% [de-identified] : No [de-identified] : Multilayer other compression wrap [de-identified] : Every other day [de-identified] : Primary Dressing

## 2024-09-25 NOTE — HISTORY OF PRESENT ILLNESS
[FreeTextEntry1] : Patient presents to [facility] with lipoma of bilateral ankles. Right ankle pain secondary to the lipoma, the pain is about a 7/10. The pain is relieved by topical over the counter anesthetic. The lipoma started about a year ago, the pain started hurting about 4-5 months ago. Patient went to podiatrist, referred here for possible surgical intervention after sonongram was performed and lipoma was diagnosed.  9/20/24 patient seen status post right ankle excision of a lipoma (DOS: 7/30/2024), dehiscence with hematoma, and now with open wound down to skin, subcutaneous tissue, fat- noted with healing progress.

## 2024-09-25 NOTE — HISTORY OF PRESENT ILLNESS
[FreeTextEntry1] : Patient presents to [facility] with lipoma of bilateral ankles. Right ankle pain secondary to the lipoma, the pain is about a 7/10. The pain is relieved by topical over the counter anesthetic. The lipoma started about a year ago, the pain started hurting about 4-5 months ago. Patient went to podiatrist, referred here for possible surgical intervention after sonongram was performed and lipoma was diagnosed.  9/25/24 patient seen status post right ankle excision of a lipoma (DOS: 7/30/2024), dehiscence with hematoma, and now with open wound down to skin, subcutaneous tissue, fat- noted with healing progress.

## 2024-09-25 NOTE — REVIEW OF SYSTEMS
[Negative] : Heme/Lymph [de-identified] : painful right ankle soft tissue mass, s/p  excision and dehiscence

## 2024-09-27 ENCOUNTER — OUTPATIENT (OUTPATIENT)
Dept: OUTPATIENT SERVICES | Facility: HOSPITAL | Age: 59
LOS: 1 days | Discharge: HOME | End: 2024-09-27
Payer: COMMERCIAL

## 2024-09-27 ENCOUNTER — APPOINTMENT (OUTPATIENT)
Dept: WOUND CARE | Facility: HOSPITAL | Age: 59
End: 2024-09-27
Payer: COMMERCIAL

## 2024-09-27 VITALS
RESPIRATION RATE: 18 BRPM | DIASTOLIC BLOOD PRESSURE: 70 MMHG | OXYGEN SATURATION: 98 % | HEIGHT: 62 IN | BODY MASS INDEX: 40.48 KG/M2 | HEART RATE: 70 BPM | TEMPERATURE: 98.2 F | WEIGHT: 220 LBS | SYSTOLIC BLOOD PRESSURE: 105 MMHG

## 2024-09-27 DIAGNOSIS — G47.33 OBSTRUCTIVE SLEEP APNEA (ADULT) (PEDIATRIC): ICD-10-CM

## 2024-09-27 DIAGNOSIS — T81.89XD OTHER COMPLICATIONS OF PROCEDURES, NOT ELSEWHERE CLASSIFIED, SUBSEQUENT ENCOUNTER: ICD-10-CM

## 2024-09-27 DIAGNOSIS — Z98.890 OTHER SPECIFIED POSTPROCEDURAL STATES: Chronic | ICD-10-CM

## 2024-09-27 DIAGNOSIS — Z98.890 OTHER SPECIFIED POSTPROCEDURAL STATES: ICD-10-CM

## 2024-09-27 DIAGNOSIS — E03.9 HYPOTHYROIDISM, UNSPECIFIED: ICD-10-CM

## 2024-09-27 DIAGNOSIS — T81.31XD DISRUPTION OF EXTERNAL OPERATION (SURGICAL) WOUND, NOT ELSEWHERE CLASSIFIED, SUBSEQUENT ENCOUNTER: ICD-10-CM

## 2024-09-27 DIAGNOSIS — Z90.3 ACQUIRED ABSENCE OF STOMACH [PART OF]: Chronic | ICD-10-CM

## 2024-09-27 DIAGNOSIS — Z96.642 PRESENCE OF LEFT ARTIFICIAL HIP JOINT: Chronic | ICD-10-CM

## 2024-09-27 DIAGNOSIS — Z83.3 FAMILY HISTORY OF DIABETES MELLITUS: ICD-10-CM

## 2024-09-27 DIAGNOSIS — Z86.16 PERSONAL HISTORY OF COVID-19: ICD-10-CM
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
## 2024-09-27 DIAGNOSIS — I10 ESSENTIAL (PRIMARY) HYPERTENSION: ICD-10-CM
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
## 2024-09-27 DIAGNOSIS — I73.00 RAYNAUD'S SYNDROME WITHOUT GANGRENE: ICD-10-CM

## 2024-09-27 DIAGNOSIS — L97.312 NON-PRESSURE CHRONIC ULCER OF RIGHT ANKLE WITH FAT LAYER EXPOSED: ICD-10-CM

## 2024-09-27 DIAGNOSIS — Z96.649 PRESENCE OF UNSPECIFIED ARTIFICIAL HIP JOINT: ICD-10-CM

## 2024-09-27 DIAGNOSIS — Z98.84 BARIATRIC SURGERY STATUS: ICD-10-CM

## 2024-09-27 DIAGNOSIS — Z83.511 FAMILY HISTORY OF GLAUCOMA: ICD-10-CM

## 2024-09-27 DIAGNOSIS — T81.81XD: ICD-10-CM

## 2024-09-27 DIAGNOSIS — Z41.9 ENCOUNTER FOR PROCEDURE FOR PURPOSES OTHER THAN REMEDYING HEALTH STATE, UNSPECIFIED: Chronic | ICD-10-CM

## 2024-09-27 DIAGNOSIS — Z80.8 FAMILY HISTORY OF MALIGNANT NEOPLASM OF OTHER ORGANS OR SYSTEMS: ICD-10-CM

## 2024-09-27 DIAGNOSIS — Z82.49 FAMILY HISTORY OF ISCHEMIC HEART DISEASE AND OTHER DISEASES OF THE CIRCULATORY SYSTEM: ICD-10-CM

## 2024-09-27 PROCEDURE — ZZZZZ: CPT

## 2024-09-27 PROCEDURE — 29581 APPL MULTLAYER CMPRN SYS LEG: CPT | Mod: RT

## 2024-09-27 RX ORDER — TRAMADOL HYDROCHLORIDE 50 MG/1
50 TABLET, COATED ORAL
Qty: 14 | Refills: 0 | Status: COMPLETED | COMMUNITY
Start: 2024-09-27 | End: 2024-10-04

## 2024-09-30 ENCOUNTER — APPOINTMENT (OUTPATIENT)
Dept: WOUND CARE | Facility: HOSPITAL | Age: 59
End: 2024-09-30
Payer: COMMERCIAL

## 2024-09-30 ENCOUNTER — OUTPATIENT (OUTPATIENT)
Dept: OUTPATIENT SERVICES | Facility: HOSPITAL | Age: 59
LOS: 1 days | Discharge: HOME | End: 2024-09-30
Payer: COMMERCIAL

## 2024-09-30 VITALS
RESPIRATION RATE: 18 BRPM | WEIGHT: 220 LBS | HEIGHT: 62 IN | DIASTOLIC BLOOD PRESSURE: 72 MMHG | BODY MASS INDEX: 40.48 KG/M2 | TEMPERATURE: 97.8 F | SYSTOLIC BLOOD PRESSURE: 114 MMHG | OXYGEN SATURATION: 98 % | HEART RATE: 79 BPM

## 2024-09-30 DIAGNOSIS — Z41.9 ENCOUNTER FOR PROCEDURE FOR PURPOSES OTHER THAN REMEDYING HEALTH STATE, UNSPECIFIED: Chronic | ICD-10-CM

## 2024-09-30 DIAGNOSIS — Z98.891 HISTORY OF UTERINE SCAR FROM PREVIOUS SURGERY: Chronic | ICD-10-CM
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
## 2024-09-30 DIAGNOSIS — L97.312 NON-PRESSURE CHRONIC ULCER OF RIGHT ANKLE WITH FAT LAYER EXPOSED: ICD-10-CM

## 2024-09-30 DIAGNOSIS — Z96.642 PRESENCE OF LEFT ARTIFICIAL HIP JOINT: Chronic | ICD-10-CM
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
## 2024-09-30 DIAGNOSIS — T81.31XD DISRUPTION OF EXTERNAL OPERATION (SURGICAL) WOUND, NOT ELSEWHERE CLASSIFIED, SUBSEQUENT ENCOUNTER: ICD-10-CM

## 2024-09-30 DIAGNOSIS — Z90.3 ACQUIRED ABSENCE OF STOMACH [PART OF]: Chronic | ICD-10-CM

## 2024-09-30 DIAGNOSIS — T81.89XD OTHER COMPLICATIONS OF PROCEDURES, NOT ELSEWHERE CLASSIFIED, SUBSEQUENT ENCOUNTER: ICD-10-CM

## 2024-09-30 DIAGNOSIS — Z98.890 OTHER SPECIFIED POSTPROCEDURAL STATES: Chronic | ICD-10-CM

## 2024-09-30 PROCEDURE — ZZZZZ: CPT

## 2024-09-30 PROCEDURE — 29581 APPL MULTLAYER CMPRN SYS LEG: CPT | Mod: RT

## 2024-10-02 ENCOUNTER — NON-APPOINTMENT (OUTPATIENT)
Age: 59
End: 2024-10-02

## 2024-10-02 ENCOUNTER — APPOINTMENT (OUTPATIENT)
Dept: WOUND CARE | Facility: HOSPITAL | Age: 59
End: 2024-10-02

## 2024-10-02 ENCOUNTER — OUTPATIENT (OUTPATIENT)
Dept: OUTPATIENT SERVICES | Facility: HOSPITAL | Age: 59
LOS: 1 days | Discharge: HOME | End: 2024-10-02
Payer: COMMERCIAL

## 2024-10-02 VITALS
OXYGEN SATURATION: 98 % | RESPIRATION RATE: 18 BRPM | BODY MASS INDEX: 40.48 KG/M2 | DIASTOLIC BLOOD PRESSURE: 67 MMHG | HEART RATE: 74 BPM | HEIGHT: 62 IN | TEMPERATURE: 98 F | WEIGHT: 220 LBS | SYSTOLIC BLOOD PRESSURE: 103 MMHG

## 2024-10-02 DIAGNOSIS — Z98.890 OTHER SPECIFIED POSTPROCEDURAL STATES: Chronic | ICD-10-CM

## 2024-10-02 DIAGNOSIS — Z90.3 ACQUIRED ABSENCE OF STOMACH [PART OF]: Chronic | ICD-10-CM

## 2024-10-02 DIAGNOSIS — T81.31XD DISRUPTION OF EXTERNAL OPERATION (SURGICAL) WOUND, NOT ELSEWHERE CLASSIFIED, SUBSEQUENT ENCOUNTER: ICD-10-CM

## 2024-10-02 DIAGNOSIS — Z98.891 HISTORY OF UTERINE SCAR FROM PREVIOUS SURGERY: Chronic | ICD-10-CM

## 2024-10-02 DIAGNOSIS — Z96.642 PRESENCE OF LEFT ARTIFICIAL HIP JOINT: Chronic | ICD-10-CM

## 2024-10-02 DIAGNOSIS — Z87.01 PERSONAL HISTORY OF PNEUMONIA (RECURRENT): ICD-10-CM

## 2024-10-02 DIAGNOSIS — Z41.9 ENCOUNTER FOR PROCEDURE FOR PURPOSES OTHER THAN REMEDYING HEALTH STATE, UNSPECIFIED: Chronic | ICD-10-CM

## 2024-10-02 PROCEDURE — 99024 POSTOP FOLLOW-UP VISIT: CPT

## 2024-10-02 PROCEDURE — G0463: CPT

## 2024-10-04 ENCOUNTER — OUTPATIENT (OUTPATIENT)
Dept: OUTPATIENT SERVICES | Facility: HOSPITAL | Age: 59
LOS: 1 days | Discharge: HOME | End: 2024-10-04
Payer: COMMERCIAL

## 2024-10-04 ENCOUNTER — APPOINTMENT (OUTPATIENT)
Dept: WOUND CARE | Facility: HOSPITAL | Age: 59
End: 2024-10-04
Payer: COMMERCIAL

## 2024-10-04 VITALS
TEMPERATURE: 98.4 F | OXYGEN SATURATION: 96 % | SYSTOLIC BLOOD PRESSURE: 98 MMHG | HEIGHT: 62 IN | BODY MASS INDEX: 40.48 KG/M2 | RESPIRATION RATE: 18 BRPM | HEART RATE: 83 BPM | WEIGHT: 220 LBS | DIASTOLIC BLOOD PRESSURE: 63 MMHG

## 2024-10-04 DIAGNOSIS — Z96.642 PRESENCE OF LEFT ARTIFICIAL HIP JOINT: Chronic | ICD-10-CM

## 2024-10-04 DIAGNOSIS — Z41.9 ENCOUNTER FOR PROCEDURE FOR PURPOSES OTHER THAN REMEDYING HEALTH STATE, UNSPECIFIED: Chronic | ICD-10-CM

## 2024-10-04 DIAGNOSIS — Z98.891 HISTORY OF UTERINE SCAR FROM PREVIOUS SURGERY: Chronic | ICD-10-CM

## 2024-10-04 DIAGNOSIS — Z98.890 OTHER SPECIFIED POSTPROCEDURAL STATES: Chronic | ICD-10-CM

## 2024-10-04 DIAGNOSIS — T81.31XD DISRUPTION OF EXTERNAL OPERATION (SURGICAL) WOUND, NOT ELSEWHERE CLASSIFIED, SUBSEQUENT ENCOUNTER: ICD-10-CM

## 2024-10-04 DIAGNOSIS — Z90.3 ACQUIRED ABSENCE OF STOMACH [PART OF]: Chronic | ICD-10-CM

## 2024-10-04 PROCEDURE — G0463: CPT

## 2024-10-04 PROCEDURE — ZZZZZ: CPT

## 2024-10-06 NOTE — HISTORY OF PRESENT ILLNESS
[FreeTextEntry1] : Patient presents to [facility] with lipoma of bilateral ankles. Right ankle pain secondary to the lipoma, the pain is about a 7/10. The pain is relieved by topical over the counter anesthetic. The lipoma started about a year ago, the pain started hurting about 4-5 months ago. Patient went to podiatrist, referred here for possible surgical intervention after sonongram was performed and lipoma was diagnosed.  10/2/24 patient seen status post right ankle excision of a lipoma (DOS: 7/30/2024), dehiscence with hematoma, and now with open wound down to skin, subcutaneous tissue, fat. Patient has been visiting the [facility] for dressing changes. Wound noted with healing and no soi.

## 2024-10-06 NOTE — ASSESSMENT
[Verbal] : Verbal [Demo] : Demo [Patient] : Patient [Spouse] : Spouse [Good - alert, interested, motivated] : Good - alert, interested, motivated [Demonstrates independently] : demonstrates independently [Dressing changes] : dressing changes [Foot Care] : foot care [Skin Care] : skin care [Signs and symptoms of infection] : sign and symptoms of infection [Nutrition] : nutrition [How and When to Call] : how and when to call [Compression Therapy] : compression therapy [Patient responsibility to plan of care] : patient responsibility to plan of care [Stable] : stable [Home] : Home [Cane] : Cane [Not Applicable - Long Term Care/Home Health Agency] : Long Term Care/Home Health Agency: Not Applicable [] : No [FreeTextEntry2] : Infection Prevention Wound care and Dressing changes Promote and Restore optimal skin integrity Nutrition and Wound Healing  Offloading and Pressure relief Protect and Guard wound site  Maintain acceptable levels of Pain Compliance R/T Compression therapy Compliance R/T Elevation of Lower Extremities [FreeTextEntry4] : LEONEL Carr assessed wound site - Verbally ordered to continue Juany, dry dressing, COBAN 3x per week. F/U 2 X Weekly for wound dressing changes. Patient to return to [facility] in One week for assessment, Friday 10/4/24 for dressing change (LEONEL Brandon to look at wound prior to dressing wound).

## 2024-10-06 NOTE — PHYSICAL EXAM
[4 x 4] : 4 x 4  [2+] : left 2+ [Ankle Swelling (On Exam)] : not present [Varicose Veins Of Lower Extremities] : not present [] : not present [de-identified] : A&Ox3, NAD [de-identified] : 5 out of 5 strength in all quadrants bilaterally, ankle joint and subtalar joint range of motion intact [de-identified] : Status post left ankle soft tissue mass excision. Right ankle incision dehiscence with open wound down to skin, subcutaneous tissue, fat, granular tissue - noted with progress of healing  [de-identified] : Light touch sensation intact bilaterally [de-identified] : status post right ankle excision of a lipoma (DOS: 7/30/2024;  8/20/24) [FreeTextEntry1] : Right Ankle, Lateral [FreeTextEntry3] : 0.4 [FreeTextEntry2] : 0.6 [FreeTextEntry4] : 0.1 [de-identified] : small serosanguineous [de-identified] : surgical [de-identified] : intact [de-identified] : Juany [de-identified] : Circulation and Neuromuscular assessment done post application. [de-identified] : Mechanically cleansed with sterile gauze and normal saline 0.9%  [TWNoteComboBox5] : No [TWNoteComboBox6] : Surgical [de-identified] : No [de-identified] : None [de-identified] : None [de-identified] : 100% [de-identified] : No [de-identified] : Multilayer other compression wrap [de-identified] : 3x Weekly [de-identified] : Primary Dressing

## 2024-10-06 NOTE — ASSESSMENT
[Verbal] : Verbal [Demo] : Demo [Patient] : Patient [Spouse] : Spouse [Good - alert, interested, motivated] : Good - alert, interested, motivated [Demonstrates independently] : demonstrates independently [Dressing changes] : dressing changes [Foot Care] : foot care [Skin Care] : skin care [Signs and symptoms of infection] : sign and symptoms of infection [Nutrition] : nutrition [How and When to Call] : how and when to call [Compression Therapy] : compression therapy [Patient responsibility to plan of care] : patient responsibility to plan of care [Stable] : stable [Home] : Home [Cane] : Cane [Not Applicable - Long Term Care/Home Health Agency] : Long Term Care/Home Health Agency: Not Applicable [] : No [FreeTextEntry2] : Infection Prevention Wound care and Dressing changes Promote and Restore optimal skin integrity Nutrition and Wound Healing  Offloading and Pressure relief Protect and Guard wound site  Maintain acceptable levels of Pain Compliance R/T Compression therapy Compliance R/T Elevation of Lower Extremities [FreeTextEntry4] : LEONEL Carr assessed wound site - Verbally ordered to continue Juany, dry dressing, COBAN 3x per week. F/U 2 X Weekly for wound dressing changes. Patient to return to [facility] in One week for assessment, Friday 10/4/24 for dressing change (LEONEL Brandon to look at wound prior to dressing wound).

## 2024-10-06 NOTE — HISTORY OF PRESENT ILLNESS
[FreeTextEntry1] : Patient presents to [facility] with lipoma of bilateral ankles. Right ankle pain secondary to the lipoma, the pain is about a 7/10. The pain is relieved by topical over the counter anesthetic. The lipoma started about a year ago, the pain started hurting about 4-5 months ago. Patient went to podiatrist, referred here for possible surgical intervention after sonongram was performed and lipoma was diagnosed.  10/2/24 patient seen status post right ankle excision of a lipoma (DOS: 7/30/2024), dehiscence with hematoma, and now with open wound down to skin, subcutaneous tissue, fat. Patient has been visiting the [facility] for dressing changes. Wound noted with healing and no soi.

## 2024-10-06 NOTE — REVIEW OF SYSTEMS
[Negative] : Heme/Lymph [de-identified] : painful right ankle soft tissue mass, s/p  excision and dehiscence

## 2024-10-06 NOTE — PHYSICAL EXAM
[4 x 4] : 4 x 4  [2+] : left 2+ [Ankle Swelling (On Exam)] : not present [Varicose Veins Of Lower Extremities] : not present [] : not present [de-identified] : A&Ox3, NAD [de-identified] : 5 out of 5 strength in all quadrants bilaterally, ankle joint and subtalar joint range of motion intact [de-identified] : Status post left ankle soft tissue mass excision. Right ankle incision dehiscence with open wound down to skin, subcutaneous tissue, fat, granular tissue - noted with progress of healing  [de-identified] : Light touch sensation intact bilaterally [de-identified] : status post right ankle excision of a lipoma (DOS: 7/30/2024;  8/20/24) [FreeTextEntry1] : Right Ankle, Lateral [FreeTextEntry3] : 0.4 [FreeTextEntry2] : 0.6 [FreeTextEntry4] : 0.1 [de-identified] : small serosanguineous [de-identified] : surgical [de-identified] : intact [de-identified] : Juany [de-identified] : Circulation and Neuromuscular assessment done post application. [de-identified] : Mechanically cleansed with sterile gauze and normal saline 0.9%  [TWNoteComboBox5] : No [TWNoteComboBox6] : Surgical [de-identified] : No [de-identified] : None [de-identified] : None [de-identified] : 100% [de-identified] : No [de-identified] : Multilayer other compression wrap [de-identified] : 3x Weekly [de-identified] : Primary Dressing

## 2024-10-06 NOTE — REVIEW OF SYSTEMS
[Negative] : Heme/Lymph [de-identified] : painful right ankle soft tissue mass, s/p  excision and dehiscence

## 2024-10-07 ENCOUNTER — OUTPATIENT (OUTPATIENT)
Dept: OUTPATIENT SERVICES | Facility: HOSPITAL | Age: 59
LOS: 1 days | Discharge: HOME | End: 2024-10-07
Payer: COMMERCIAL

## 2024-10-07 ENCOUNTER — APPOINTMENT (OUTPATIENT)
Dept: WOUND CARE | Facility: HOSPITAL | Age: 59
End: 2024-10-07
Payer: COMMERCIAL

## 2024-10-07 VITALS
HEART RATE: 68 BPM | DIASTOLIC BLOOD PRESSURE: 76 MMHG | WEIGHT: 220 LBS | TEMPERATURE: 97.8 F | BODY MASS INDEX: 40.48 KG/M2 | RESPIRATION RATE: 18 BRPM | HEIGHT: 62 IN | OXYGEN SATURATION: 97 % | SYSTOLIC BLOOD PRESSURE: 120 MMHG

## 2024-10-07 DIAGNOSIS — Z83.511 FAMILY HISTORY OF GLAUCOMA: ICD-10-CM
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
## 2024-10-07 DIAGNOSIS — Z87.09 PERSONAL HISTORY OF OTHER DISEASES OF THE RESPIRATORY SYSTEM: ICD-10-CM

## 2024-10-07 DIAGNOSIS — I73.00 RAYNAUD'S SYNDROME WITHOUT GANGRENE: ICD-10-CM

## 2024-10-07 DIAGNOSIS — Z98.890 OTHER SPECIFIED POSTPROCEDURAL STATES: Chronic | ICD-10-CM

## 2024-10-07 DIAGNOSIS — T81.31XD DISRUPTION OF EXTERNAL OPERATION (SURGICAL) WOUND, NOT ELSEWHERE CLASSIFIED, SUBSEQUENT ENCOUNTER: ICD-10-CM

## 2024-10-07 DIAGNOSIS — Z96.649 PRESENCE OF UNSPECIFIED ARTIFICIAL HIP JOINT: ICD-10-CM

## 2024-10-07 DIAGNOSIS — I10 ESSENTIAL (PRIMARY) HYPERTENSION: ICD-10-CM

## 2024-10-07 DIAGNOSIS — Z98.84 BARIATRIC SURGERY STATUS: ICD-10-CM

## 2024-10-07 DIAGNOSIS — G47.33 OBSTRUCTIVE SLEEP APNEA (ADULT) (PEDIATRIC): ICD-10-CM
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
## 2024-10-07 DIAGNOSIS — E03.9 HYPOTHYROIDISM, UNSPECIFIED: ICD-10-CM

## 2024-10-07 DIAGNOSIS — Z83.3 FAMILY HISTORY OF DIABETES MELLITUS: ICD-10-CM

## 2024-10-07 DIAGNOSIS — L97.312 NON-PRESSURE CHRONIC ULCER OF RIGHT ANKLE WITH FAT LAYER EXPOSED: ICD-10-CM

## 2024-10-07 DIAGNOSIS — Z86.16 PERSONAL HISTORY OF COVID-19: ICD-10-CM

## 2024-10-07 DIAGNOSIS — Z41.9 ENCOUNTER FOR PROCEDURE FOR PURPOSES OTHER THAN REMEDYING HEALTH STATE, UNSPECIFIED: Chronic | ICD-10-CM

## 2024-10-07 DIAGNOSIS — Z96.642 PRESENCE OF LEFT ARTIFICIAL HIP JOINT: Chronic | ICD-10-CM

## 2024-10-07 DIAGNOSIS — Z98.891 HISTORY OF UTERINE SCAR FROM PREVIOUS SURGERY: Chronic | ICD-10-CM

## 2024-10-07 DIAGNOSIS — Z98.890 OTHER SPECIFIED POSTPROCEDURAL STATES: ICD-10-CM

## 2024-10-07 DIAGNOSIS — Z90.3 ACQUIRED ABSENCE OF STOMACH [PART OF]: Chronic | ICD-10-CM

## 2024-10-07 DIAGNOSIS — Z82.49 FAMILY HISTORY OF ISCHEMIC HEART DISEASE AND OTHER DISEASES OF THE CIRCULATORY SYSTEM: ICD-10-CM

## 2024-10-07 DIAGNOSIS — Z80.8 FAMILY HISTORY OF MALIGNANT NEOPLASM OF OTHER ORGANS OR SYSTEMS: ICD-10-CM

## 2024-10-07 PROCEDURE — 29581 APPL MULTLAYER CMPRN SYS LEG: CPT | Mod: RT

## 2024-10-07 PROCEDURE — 99024 POSTOP FOLLOW-UP VISIT: CPT

## 2024-10-08 ENCOUNTER — APPOINTMENT (OUTPATIENT)
Dept: PODIATRY | Facility: CLINIC | Age: 59
End: 2024-10-08
Payer: COMMERCIAL

## 2024-10-08 DIAGNOSIS — G47.33 OBSTRUCTIVE SLEEP APNEA (ADULT) (PEDIATRIC): ICD-10-CM

## 2024-10-08 DIAGNOSIS — Z80.8 FAMILY HISTORY OF MALIGNANT NEOPLASM OF OTHER ORGANS OR SYSTEMS: ICD-10-CM
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
## 2024-10-08 DIAGNOSIS — Z96.649 PRESENCE OF UNSPECIFIED ARTIFICIAL HIP JOINT: ICD-10-CM

## 2024-10-08 DIAGNOSIS — I10 ESSENTIAL (PRIMARY) HYPERTENSION: ICD-10-CM

## 2024-10-08 DIAGNOSIS — L97.312 NON-PRESSURE CHRONIC ULCER OF RIGHT ANKLE WITH FAT LAYER EXPOSED: ICD-10-CM

## 2024-10-08 DIAGNOSIS — Z98.890 OTHER SPECIFIED POSTPROCEDURAL STATES: ICD-10-CM

## 2024-10-08 DIAGNOSIS — Z82.49 FAMILY HISTORY OF ISCHEMIC HEART DISEASE AND OTHER DISEASES OF THE CIRCULATORY SYSTEM: ICD-10-CM
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
## 2024-10-08 DIAGNOSIS — Z86.16 PERSONAL HISTORY OF COVID-19: ICD-10-CM

## 2024-10-08 DIAGNOSIS — B35.1 TINEA UNGUIUM: ICD-10-CM

## 2024-10-08 DIAGNOSIS — Z83.3 FAMILY HISTORY OF DIABETES MELLITUS: ICD-10-CM

## 2024-10-08 DIAGNOSIS — I73.00 RAYNAUD'S SYNDROME WITHOUT GANGRENE: ICD-10-CM

## 2024-10-08 DIAGNOSIS — Z83.511 FAMILY HISTORY OF GLAUCOMA: ICD-10-CM

## 2024-10-08 DIAGNOSIS — Z98.84 BARIATRIC SURGERY STATUS: ICD-10-CM

## 2024-10-08 DIAGNOSIS — T81.31XD DISRUPTION OF EXTERNAL OPERATION (SURGICAL) WOUND, NOT ELSEWHERE CLASSIFIED, SUBSEQUENT ENCOUNTER: ICD-10-CM

## 2024-10-08 DIAGNOSIS — E03.9 HYPOTHYROIDISM, UNSPECIFIED: ICD-10-CM

## 2024-10-08 PROCEDURE — 99213 OFFICE O/P EST LOW 20 MIN: CPT | Mod: 24

## 2024-10-09 ENCOUNTER — OUTPATIENT (OUTPATIENT)
Dept: OUTPATIENT SERVICES | Facility: HOSPITAL | Age: 59
LOS: 1 days | Discharge: HOME | End: 2024-10-09
Payer: COMMERCIAL

## 2024-10-09 ENCOUNTER — APPOINTMENT (OUTPATIENT)
Dept: WOUND CARE | Facility: HOSPITAL | Age: 59
End: 2024-10-09
Payer: COMMERCIAL

## 2024-10-09 DIAGNOSIS — Z98.890 OTHER SPECIFIED POSTPROCEDURAL STATES: Chronic | ICD-10-CM

## 2024-10-09 DIAGNOSIS — Z41.9 ENCOUNTER FOR PROCEDURE FOR PURPOSES OTHER THAN REMEDYING HEALTH STATE, UNSPECIFIED: Chronic | ICD-10-CM

## 2024-10-09 DIAGNOSIS — Z96.642 PRESENCE OF LEFT ARTIFICIAL HIP JOINT: Chronic | ICD-10-CM

## 2024-10-09 DIAGNOSIS — T81.89XD OTHER COMPLICATIONS OF PROCEDURES, NOT ELSEWHERE CLASSIFIED, SUBSEQUENT ENCOUNTER: ICD-10-CM

## 2024-10-09 DIAGNOSIS — T81.31XD DISRUPTION OF EXTERNAL OPERATION (SURGICAL) WOUND, NOT ELSEWHERE CLASSIFIED, SUBSEQUENT ENCOUNTER: ICD-10-CM

## 2024-10-09 DIAGNOSIS — Z98.891 HISTORY OF UTERINE SCAR FROM PREVIOUS SURGERY: Chronic | ICD-10-CM

## 2024-10-09 PROCEDURE — 99024 POSTOP FOLLOW-UP VISIT: CPT

## 2024-10-09 PROCEDURE — G0463: CPT

## 2024-10-11 ENCOUNTER — OUTPATIENT (OUTPATIENT)
Dept: OUTPATIENT SERVICES | Facility: HOSPITAL | Age: 59
LOS: 1 days | Discharge: HOME | End: 2024-10-11
Payer: COMMERCIAL

## 2024-10-11 ENCOUNTER — APPOINTMENT (OUTPATIENT)
Dept: WOUND CARE | Facility: HOSPITAL | Age: 59
End: 2024-10-11
Payer: COMMERCIAL

## 2024-10-11 VITALS
BODY MASS INDEX: 40.48 KG/M2 | OXYGEN SATURATION: 95 % | HEART RATE: 79 BPM | HEIGHT: 62 IN | TEMPERATURE: 97.9 F | RESPIRATION RATE: 16 BRPM | DIASTOLIC BLOOD PRESSURE: 76 MMHG | SYSTOLIC BLOOD PRESSURE: 110 MMHG | WEIGHT: 220 LBS
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
## 2024-10-11 DIAGNOSIS — L97.312 NON-PRESSURE CHRONIC ULCER OF RIGHT ANKLE WITH FAT LAYER EXPOSED: ICD-10-CM

## 2024-10-11 DIAGNOSIS — Z98.890 OTHER SPECIFIED POSTPROCEDURAL STATES: ICD-10-CM

## 2024-10-11 DIAGNOSIS — Z98.891 HISTORY OF UTERINE SCAR FROM PREVIOUS SURGERY: Chronic | ICD-10-CM

## 2024-10-11 DIAGNOSIS — Z80.41 FAMILY HISTORY OF MALIGNANT NEOPLASM OF OVARY: ICD-10-CM

## 2024-10-11 DIAGNOSIS — Z80.8 FAMILY HISTORY OF MALIGNANT NEOPLASM OF OTHER ORGANS OR SYSTEMS: ICD-10-CM

## 2024-10-11 DIAGNOSIS — T81.31XD DISRUPTION OF EXTERNAL OPERATION (SURGICAL) WOUND, NOT ELSEWHERE CLASSIFIED, SUBSEQUENT ENCOUNTER: ICD-10-CM
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
## 2024-10-11 DIAGNOSIS — I73.00 RAYNAUD'S SYNDROME WITHOUT GANGRENE: ICD-10-CM

## 2024-10-11 DIAGNOSIS — I10 ESSENTIAL (PRIMARY) HYPERTENSION: ICD-10-CM

## 2024-10-11 DIAGNOSIS — Z96.649 PRESENCE OF UNSPECIFIED ARTIFICIAL HIP JOINT: ICD-10-CM

## 2024-10-11 DIAGNOSIS — E03.9 HYPOTHYROIDISM, UNSPECIFIED: ICD-10-CM

## 2024-10-11 DIAGNOSIS — Z86.16 PERSONAL HISTORY OF COVID-19: ICD-10-CM

## 2024-10-11 DIAGNOSIS — Z83.511 FAMILY HISTORY OF GLAUCOMA: ICD-10-CM

## 2024-10-11 DIAGNOSIS — Z96.642 PRESENCE OF LEFT ARTIFICIAL HIP JOINT: Chronic | ICD-10-CM

## 2024-10-11 DIAGNOSIS — Z82.49 FAMILY HISTORY OF ISCHEMIC HEART DISEASE AND OTHER DISEASES OF THE CIRCULATORY SYSTEM: ICD-10-CM

## 2024-10-11 DIAGNOSIS — Z83.3 FAMILY HISTORY OF DIABETES MELLITUS: ICD-10-CM

## 2024-10-11 DIAGNOSIS — G47.33 OBSTRUCTIVE SLEEP APNEA (ADULT) (PEDIATRIC): ICD-10-CM

## 2024-10-11 DIAGNOSIS — Z98.84 BARIATRIC SURGERY STATUS: ICD-10-CM

## 2024-10-11 PROCEDURE — G0463: CPT

## 2024-10-11 PROCEDURE — ZZZZZ: CPT

## 2024-10-16 ENCOUNTER — OUTPATIENT (OUTPATIENT)
Dept: OUTPATIENT SERVICES | Facility: HOSPITAL | Age: 59
LOS: 1 days | Discharge: HOME | End: 2024-10-16
Payer: COMMERCIAL

## 2024-10-16 ENCOUNTER — APPOINTMENT (OUTPATIENT)
Dept: WOUND CARE | Facility: HOSPITAL | Age: 59
End: 2024-10-16
Payer: COMMERCIAL

## 2024-10-16 VITALS
BODY MASS INDEX: 40.48 KG/M2 | SYSTOLIC BLOOD PRESSURE: 115 MMHG | OXYGEN SATURATION: 97 % | HEIGHT: 62 IN | WEIGHT: 220 LBS | HEART RATE: 74 BPM | RESPIRATION RATE: 18 BRPM | TEMPERATURE: 98 F | DIASTOLIC BLOOD PRESSURE: 77 MMHG

## 2024-10-16 DIAGNOSIS — Z96.642 PRESENCE OF LEFT ARTIFICIAL HIP JOINT: Chronic | ICD-10-CM

## 2024-10-16 DIAGNOSIS — L97.312 NON-PRESSURE CHRONIC ULCER OF RIGHT ANKLE WITH FAT LAYER EXPOSED: ICD-10-CM

## 2024-10-16 DIAGNOSIS — Z98.890 OTHER SPECIFIED POSTPROCEDURAL STATES: Chronic | ICD-10-CM

## 2024-10-16 DIAGNOSIS — Z41.9 ENCOUNTER FOR PROCEDURE FOR PURPOSES OTHER THAN REMEDYING HEALTH STATE, UNSPECIFIED: Chronic | ICD-10-CM

## 2024-10-16 DIAGNOSIS — T81.31XD DISRUPTION OF EXTERNAL OPERATION (SURGICAL) WOUND, NOT ELSEWHERE CLASSIFIED, SUBSEQUENT ENCOUNTER: ICD-10-CM

## 2024-10-16 DIAGNOSIS — Z98.891 HISTORY OF UTERINE SCAR FROM PREVIOUS SURGERY: Chronic | ICD-10-CM

## 2024-10-16 PROCEDURE — 99024 POSTOP FOLLOW-UP VISIT: CPT

## 2024-10-16 PROCEDURE — G0463: CPT

## 2024-10-18 ENCOUNTER — APPOINTMENT (OUTPATIENT)
Dept: WOUND CARE | Facility: HOSPITAL | Age: 59
End: 2024-10-18
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
## 2024-10-18 DIAGNOSIS — Z80.8 FAMILY HISTORY OF MALIGNANT NEOPLASM OF OTHER ORGANS OR SYSTEMS: ICD-10-CM

## 2024-10-18 DIAGNOSIS — Z83.3 FAMILY HISTORY OF DIABETES MELLITUS: ICD-10-CM

## 2024-10-18 DIAGNOSIS — Z98.84 BARIATRIC SURGERY STATUS: ICD-10-CM

## 2024-10-18 DIAGNOSIS — T81.31XD DISRUPTION OF EXTERNAL OPERATION (SURGICAL) WOUND, NOT ELSEWHERE CLASSIFIED, SUBSEQUENT ENCOUNTER: ICD-10-CM

## 2024-10-18 DIAGNOSIS — I10 ESSENTIAL (PRIMARY) HYPERTENSION: ICD-10-CM

## 2024-10-18 DIAGNOSIS — E03.9 HYPOTHYROIDISM, UNSPECIFIED: ICD-10-CM

## 2024-10-18 DIAGNOSIS — Z96.649 PRESENCE OF UNSPECIFIED ARTIFICIAL HIP JOINT: ICD-10-CM

## 2024-10-18 DIAGNOSIS — I73.00 RAYNAUD'S SYNDROME WITHOUT GANGRENE: ICD-10-CM

## 2024-10-18 DIAGNOSIS — Z98.890 OTHER SPECIFIED POSTPROCEDURAL STATES: ICD-10-CM

## 2024-10-18 DIAGNOSIS — Z83.511 FAMILY HISTORY OF GLAUCOMA: ICD-10-CM

## 2024-10-18 DIAGNOSIS — Z82.49 FAMILY HISTORY OF ISCHEMIC HEART DISEASE AND OTHER DISEASES OF THE CIRCULATORY SYSTEM: ICD-10-CM

## 2024-10-18 DIAGNOSIS — Z86.16 PERSONAL HISTORY OF COVID-19: ICD-10-CM
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
## 2024-10-18 DIAGNOSIS — L97.312 NON-PRESSURE CHRONIC ULCER OF RIGHT ANKLE WITH FAT LAYER EXPOSED: ICD-10-CM

## 2024-10-18 DIAGNOSIS — G47.33 OBSTRUCTIVE SLEEP APNEA (ADULT) (PEDIATRIC): ICD-10-CM

## 2024-10-31 ENCOUNTER — APPOINTMENT (OUTPATIENT)
Dept: WOUND CARE | Facility: HOSPITAL | Age: 59
End: 2024-10-31
Payer: COMMERCIAL

## 2024-10-31 ENCOUNTER — OUTPATIENT (OUTPATIENT)
Dept: OUTPATIENT SERVICES | Facility: HOSPITAL | Age: 59
LOS: 1 days | Discharge: HOME | End: 2024-10-31
Payer: COMMERCIAL

## 2024-10-31 VITALS
TEMPERATURE: 97.8 F | OXYGEN SATURATION: 98 % | HEIGHT: 62 IN | RESPIRATION RATE: 18 BRPM | WEIGHT: 220 LBS | SYSTOLIC BLOOD PRESSURE: 113 MMHG | HEART RATE: 87 BPM | DIASTOLIC BLOOD PRESSURE: 78 MMHG | BODY MASS INDEX: 40.48 KG/M2

## 2024-10-31 DIAGNOSIS — Z98.890 OTHER SPECIFIED POSTPROCEDURAL STATES: Chronic | ICD-10-CM

## 2024-10-31 DIAGNOSIS — Z98.891 HISTORY OF UTERINE SCAR FROM PREVIOUS SURGERY: Chronic | ICD-10-CM

## 2024-10-31 DIAGNOSIS — Z90.3 ACQUIRED ABSENCE OF STOMACH [PART OF]: Chronic | ICD-10-CM

## 2024-10-31 DIAGNOSIS — T81.31XD DISRUPTION OF EXTERNAL OPERATION (SURGICAL) WOUND, NOT ELSEWHERE CLASSIFIED, SUBSEQUENT ENCOUNTER: ICD-10-CM

## 2024-10-31 DIAGNOSIS — Z41.9 ENCOUNTER FOR PROCEDURE FOR PURPOSES OTHER THAN REMEDYING HEALTH STATE, UNSPECIFIED: Chronic | ICD-10-CM

## 2024-10-31 DIAGNOSIS — L97.312 NON-PRESSURE CHRONIC ULCER OF RIGHT ANKLE WITH FAT LAYER EXPOSED: ICD-10-CM

## 2024-10-31 DIAGNOSIS — Z96.642 PRESENCE OF LEFT ARTIFICIAL HIP JOINT: Chronic | ICD-10-CM

## 2024-10-31 PROCEDURE — G0463: CPT

## 2024-10-31 PROCEDURE — 99213 OFFICE O/P EST LOW 20 MIN: CPT

## 2024-11-03 DIAGNOSIS — T81.31XD DISRUPTION OF EXTERNAL OPERATION (SURGICAL) WOUND, NOT ELSEWHERE CLASSIFIED, SUBSEQUENT ENCOUNTER: ICD-10-CM

## 2024-11-03 DIAGNOSIS — Z96.649 PRESENCE OF UNSPECIFIED ARTIFICIAL HIP JOINT: ICD-10-CM

## 2024-11-03 DIAGNOSIS — I73.00 RAYNAUD'S SYNDROME WITHOUT GANGRENE: ICD-10-CM

## 2024-11-03 DIAGNOSIS — I10 ESSENTIAL (PRIMARY) HYPERTENSION: ICD-10-CM

## 2024-11-03 DIAGNOSIS — Z83.3 FAMILY HISTORY OF DIABETES MELLITUS: ICD-10-CM

## 2024-11-03 DIAGNOSIS — Z98.890 OTHER SPECIFIED POSTPROCEDURAL STATES: ICD-10-CM

## 2024-11-03 DIAGNOSIS — L97.312 NON-PRESSURE CHRONIC ULCER OF RIGHT ANKLE WITH FAT LAYER EXPOSED: ICD-10-CM

## 2024-11-03 DIAGNOSIS — E03.9 HYPOTHYROIDISM, UNSPECIFIED: ICD-10-CM

## 2024-11-03 DIAGNOSIS — Z98.84 BARIATRIC SURGERY STATUS: ICD-10-CM

## 2024-11-03 DIAGNOSIS — Z82.49 FAMILY HISTORY OF ISCHEMIC HEART DISEASE AND OTHER DISEASES OF THE CIRCULATORY SYSTEM: ICD-10-CM
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
## 2024-11-03 DIAGNOSIS — Z86.16 PERSONAL HISTORY OF COVID-19: ICD-10-CM

## 2024-11-03 DIAGNOSIS — G47.33 OBSTRUCTIVE SLEEP APNEA (ADULT) (PEDIATRIC): ICD-10-CM

## 2024-11-03 DIAGNOSIS — Z83.511 FAMILY HISTORY OF GLAUCOMA: ICD-10-CM
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
## 2024-11-03 DIAGNOSIS — Z80.8 FAMILY HISTORY OF MALIGNANT NEOPLASM OF OTHER ORGANS OR SYSTEMS: ICD-10-CM

## 2024-11-08 ENCOUNTER — APPOINTMENT (OUTPATIENT)
Dept: PODIATRY | Facility: CLINIC | Age: 59
End: 2024-11-08
Payer: COMMERCIAL

## 2024-11-08 DIAGNOSIS — B35.1 TINEA UNGUIUM: ICD-10-CM

## 2024-11-08 PROCEDURE — 99213 OFFICE O/P EST LOW 20 MIN: CPT

## 2024-12-06 ENCOUNTER — NON-APPOINTMENT (OUTPATIENT)
Age: 59
End: 2024-12-06

## 2024-12-06 ENCOUNTER — APPOINTMENT (OUTPATIENT)
Dept: PODIATRY | Facility: CLINIC | Age: 59
End: 2024-12-06
Payer: COMMERCIAL

## 2024-12-06 DIAGNOSIS — B35.1 TINEA UNGUIUM: ICD-10-CM

## 2024-12-06 PROCEDURE — 99213 OFFICE O/P EST LOW 20 MIN: CPT

## 2025-01-02 ENCOUNTER — OUTPATIENT (OUTPATIENT)
Dept: OUTPATIENT SERVICES | Facility: HOSPITAL | Age: 60
LOS: 1 days | End: 2025-01-02
Payer: COMMERCIAL

## 2025-01-02 ENCOUNTER — APPOINTMENT (OUTPATIENT)
Dept: ULTRASOUND IMAGING | Facility: CLINIC | Age: 60
End: 2025-01-02
Payer: COMMERCIAL

## 2025-01-02 DIAGNOSIS — Z96.642 PRESENCE OF LEFT ARTIFICIAL HIP JOINT: Chronic | ICD-10-CM

## 2025-01-02 DIAGNOSIS — Z98.891 HISTORY OF UTERINE SCAR FROM PREVIOUS SURGERY: Chronic | ICD-10-CM

## 2025-01-02 DIAGNOSIS — Z98.890 OTHER SPECIFIED POSTPROCEDURAL STATES: Chronic | ICD-10-CM

## 2025-01-02 DIAGNOSIS — E04.2 NONTOXIC MULTINODULAR GOITER: ICD-10-CM

## 2025-01-02 DIAGNOSIS — Z90.3 ACQUIRED ABSENCE OF STOMACH [PART OF]: Chronic | ICD-10-CM

## 2025-01-02 DIAGNOSIS — Z00.8 ENCOUNTER FOR OTHER GENERAL EXAMINATION: ICD-10-CM

## 2025-01-02 PROCEDURE — 76536 US EXAM OF HEAD AND NECK: CPT | Mod: 26

## 2025-01-02 PROCEDURE — 76536 US EXAM OF HEAD AND NECK: CPT

## 2025-01-10 ENCOUNTER — APPOINTMENT (OUTPATIENT)
Dept: PODIATRY | Facility: CLINIC | Age: 60
End: 2025-01-10
Payer: COMMERCIAL

## 2025-01-10 DIAGNOSIS — B35.1 TINEA UNGUIUM: ICD-10-CM

## 2025-01-10 PROCEDURE — 99213 OFFICE O/P EST LOW 20 MIN: CPT

## 2025-01-21 ENCOUNTER — APPOINTMENT (OUTPATIENT)
Dept: WOUND CARE | Facility: HOSPITAL | Age: 60
End: 2025-01-21

## 2025-01-21 ENCOUNTER — OUTPATIENT (OUTPATIENT)
Dept: OUTPATIENT SERVICES | Facility: HOSPITAL | Age: 60
LOS: 1 days | Discharge: HOME | End: 2025-01-21
Payer: COMMERCIAL

## 2025-01-21 VITALS
WEIGHT: 220 LBS | SYSTOLIC BLOOD PRESSURE: 137 MMHG | HEIGHT: 62 IN | RESPIRATION RATE: 18 BRPM | OXYGEN SATURATION: 98 % | DIASTOLIC BLOOD PRESSURE: 82 MMHG | TEMPERATURE: 98.2 F | BODY MASS INDEX: 40.48 KG/M2 | HEART RATE: 74 BPM

## 2025-01-21 DIAGNOSIS — Z98.891 HISTORY OF UTERINE SCAR FROM PREVIOUS SURGERY: Chronic | ICD-10-CM

## 2025-01-21 DIAGNOSIS — Z98.890 OTHER SPECIFIED POSTPROCEDURAL STATES: Chronic | ICD-10-CM

## 2025-01-21 DIAGNOSIS — M25.571 PAIN IN RIGHT ANKLE AND JOINTS OF RIGHT FOOT: ICD-10-CM

## 2025-01-21 DIAGNOSIS — Z96.642 PRESENCE OF LEFT ARTIFICIAL HIP JOINT: Chronic | ICD-10-CM

## 2025-01-21 DIAGNOSIS — M25.572 PAIN IN RIGHT ANKLE AND JOINTS OF RIGHT FOOT: ICD-10-CM

## 2025-01-21 DIAGNOSIS — Z90.3 ACQUIRED ABSENCE OF STOMACH [PART OF]: Chronic | ICD-10-CM

## 2025-01-21 DIAGNOSIS — Z41.9 ENCOUNTER FOR PROCEDURE FOR PURPOSES OTHER THAN REMEDYING HEALTH STATE, UNSPECIFIED: Chronic | ICD-10-CM

## 2025-01-21 PROCEDURE — 99214 OFFICE O/P EST MOD 30 MIN: CPT

## 2025-01-21 PROCEDURE — G0463: CPT

## 2025-01-29 DIAGNOSIS — Z83.511 FAMILY HISTORY OF GLAUCOMA: ICD-10-CM

## 2025-01-29 DIAGNOSIS — M25.571 PAIN IN RIGHT ANKLE AND JOINTS OF RIGHT FOOT: ICD-10-CM

## 2025-01-29 DIAGNOSIS — Z82.49 FAMILY HISTORY OF ISCHEMIC HEART DISEASE AND OTHER DISEASES OF THE CIRCULATORY SYSTEM: ICD-10-CM

## 2025-01-29 DIAGNOSIS — E03.9 HYPOTHYROIDISM, UNSPECIFIED: ICD-10-CM

## 2025-01-29 DIAGNOSIS — Z80.8 FAMILY HISTORY OF MALIGNANT NEOPLASM OF OTHER ORGANS OR SYSTEMS: ICD-10-CM

## 2025-01-29 DIAGNOSIS — Z86.16 PERSONAL HISTORY OF COVID-19: ICD-10-CM

## 2025-01-29 DIAGNOSIS — Z98.890 OTHER SPECIFIED POSTPROCEDURAL STATES: ICD-10-CM

## 2025-01-29 DIAGNOSIS — I73.00 RAYNAUD'S SYNDROME WITHOUT GANGRENE: ICD-10-CM

## 2025-01-29 DIAGNOSIS — Z96.649 PRESENCE OF UNSPECIFIED ARTIFICIAL HIP JOINT: ICD-10-CM

## 2025-01-29 DIAGNOSIS — Z83.3 FAMILY HISTORY OF DIABETES MELLITUS: ICD-10-CM

## 2025-01-29 DIAGNOSIS — I10 ESSENTIAL (PRIMARY) HYPERTENSION: ICD-10-CM

## 2025-01-29 DIAGNOSIS — Z98.84 BARIATRIC SURGERY STATUS: ICD-10-CM

## 2025-01-29 DIAGNOSIS — G47.33 OBSTRUCTIVE SLEEP APNEA (ADULT) (PEDIATRIC): ICD-10-CM

## 2025-01-29 NOTE — ASU PREOP CHECKLIST - ADVANCE DIRECTIVE ADDRESSED/READDRESSED
INR not at goal. Medications, chart, and patient findings reviewed. See calendar for adjustments to dose and follow up plan.     done

## 2025-02-14 ENCOUNTER — NON-APPOINTMENT (OUTPATIENT)
Age: 60
End: 2025-02-14

## 2025-02-14 ENCOUNTER — APPOINTMENT (OUTPATIENT)
Dept: PODIATRY | Facility: CLINIC | Age: 60
End: 2025-02-14
Payer: COMMERCIAL

## 2025-02-14 DIAGNOSIS — B35.1 TINEA UNGUIUM: ICD-10-CM

## 2025-02-14 PROCEDURE — 99213 OFFICE O/P EST LOW 20 MIN: CPT

## 2025-02-24 DIAGNOSIS — M76.61 ACHILLES TENDINITIS, RIGHT LEG: ICD-10-CM

## 2025-03-20 ENCOUNTER — APPOINTMENT (OUTPATIENT)
Dept: PODIATRY | Facility: CLINIC | Age: 60
End: 2025-03-20
Payer: COMMERCIAL

## 2025-03-20 DIAGNOSIS — B35.1 TINEA UNGUIUM: ICD-10-CM

## 2025-03-20 PROCEDURE — 99213 OFFICE O/P EST LOW 20 MIN: CPT

## 2025-04-24 ENCOUNTER — APPOINTMENT (OUTPATIENT)
Dept: PODIATRY | Facility: CLINIC | Age: 60
End: 2025-04-24
Payer: COMMERCIAL

## 2025-04-24 DIAGNOSIS — B35.1 TINEA UNGUIUM: ICD-10-CM

## 2025-04-24 PROCEDURE — 99213 OFFICE O/P EST LOW 20 MIN: CPT

## 2025-04-25 ENCOUNTER — APPOINTMENT (OUTPATIENT)
Dept: PODIATRY | Facility: CLINIC | Age: 60
End: 2025-04-25

## 2025-04-25 DIAGNOSIS — R60.0 LOCALIZED EDEMA: ICD-10-CM

## 2025-04-25 DIAGNOSIS — M79.672 PAIN IN LEFT FOOT: ICD-10-CM

## 2025-04-25 DIAGNOSIS — M76.812 ANTERIOR TIBIAL SYNDROME, LEFT LEG: ICD-10-CM

## 2025-04-25 PROCEDURE — 20550 NJX 1 TENDON SHEATH/LIGAMENT: CPT | Mod: LT

## 2025-04-25 PROCEDURE — 73630 X-RAY EXAM OF FOOT: CPT | Mod: LT

## 2025-04-25 PROCEDURE — 29540 STRAPPING ANKLE &/FOOT: CPT | Mod: 59,LT

## 2025-05-09 ENCOUNTER — APPOINTMENT (OUTPATIENT)
Dept: PODIATRY | Facility: CLINIC | Age: 60
End: 2025-05-09

## 2025-05-09 DIAGNOSIS — B35.1 TINEA UNGUIUM: ICD-10-CM

## 2025-05-09 DIAGNOSIS — M76.812 ANTERIOR TIBIAL SYNDROME, LEFT LEG: ICD-10-CM

## 2025-05-09 DIAGNOSIS — R60.0 LOCALIZED EDEMA: ICD-10-CM

## 2025-05-09 PROCEDURE — 29540 STRAPPING ANKLE &/FOOT: CPT | Mod: 59,LT

## 2025-05-09 PROCEDURE — 20550 NJX 1 TENDON SHEATH/LIGAMENT: CPT | Mod: 59,LT

## 2025-05-21 NOTE — ASU PREOP CHECKLIST - BMI (KG/M2)
Medication failed protocol.    Medication: Klor-Con M20 20 MEQ Oral Tablet Extended Release   Last office visit date: 12/4/24  Next appointment scheduled?: No   Medication Refill Protocol Failed.  Medication Refill Protocol Failed. Courtesy Refill provided after [employer] message sent to patient to remind about completing necessary labs per protocol guidelines. Writer confirmed, courtesy refill was not a duplicate.    Medication passed protocol.     Medication: Losartan Potassium 100 MG Oral Tablet passed protocol.   Last office visit date: 12/4/24  Next appointment scheduled?: No     Zack Jensen CMA   49.4

## 2025-05-27 ENCOUNTER — NON-APPOINTMENT (OUTPATIENT)
Age: 60
End: 2025-05-27

## 2025-07-10 NOTE — DIETITIAN INITIAL EVALUATION ADULT - PROBLEM SELECTOR PLAN 3
Stable [Vaccines Reviewed] : Immunizations reviewed today. Please see immunization details in the vaccine log within the immunization flowsheet.  [FreeTextEntry1] : Annual Physical Exam: CAD (multiple vessel) - EKG stable - BP is stable. Continue current management. - Order US Duplex Carotid Arteries Complete (Bilateral)-assess for stenosis -Cologuard ordered, advised shingles vaccine   - RTO annually or as needed.   Pt verbalized understanding and will reach out should any questions/concerns occur.

## 2025-07-18 ENCOUNTER — APPOINTMENT (OUTPATIENT)
Dept: PODIATRY | Facility: CLINIC | Age: 60
End: 2025-07-18
Payer: COMMERCIAL

## 2025-07-18 PROCEDURE — 99213 OFFICE O/P EST LOW 20 MIN: CPT

## 2025-08-22 ENCOUNTER — APPOINTMENT (OUTPATIENT)
Dept: PODIATRY | Facility: CLINIC | Age: 60
End: 2025-08-22

## (undated) DEVICE — S&N VERSAJET II EXACT HANDPIECE 14MM X 45 DEGREE

## (undated) DEVICE — NDL HYPO SAFE 18G X 1.5" (PINK)

## (undated) DEVICE — DRSG KERLIX ROLL 4.5"

## (undated) DEVICE — DRAPE 3/4 SHEET W REINFORCEMENT 56X77"

## (undated) DEVICE — PREP BETADINE KIT

## (undated) DEVICE — SOL IRR POUR NS 0.9% 1000ML

## (undated) DEVICE — DRAPE TOWEL BLUE 17" X 24"

## (undated) DEVICE — TUBING TUR 2 PRONG

## (undated) DEVICE — DRSG VAC GRANUFOAM SMALL (BLACK)

## (undated) DEVICE — GLV 7.5 PROTEXIS (WHITE)

## (undated) DEVICE — TUBING IV EXTENSION 30"

## (undated) DEVICE — SOL INJ NS 0.9% 1000ML

## (undated) DEVICE — WARMING BLANKET UPPER ADULT

## (undated) DEVICE — PLV-SCD MACHINE: Type: DURABLE MEDICAL EQUIPMENT

## (undated) DEVICE — PACK LITHOTOMY

## (undated) DEVICE — DRSG TELFA 3 X 8

## (undated) DEVICE — VENODYNE/SCD SLEEVE CALF LARGE

## (undated) DEVICE — NDL HYPO SAFE 22G X 1.5" (BLACK)

## (undated) DEVICE — ELCTR BOVIE TIP BLADE INSULATED 2.75" EDGE

## (undated) DEVICE — BLADE SCALPEL SAFETYLOCK #15

## (undated) DEVICE — DRSG COMBINE 5X9"

## (undated) DEVICE — GLV 7.5 PROTEXIS (CREAM) NEU-THERA

## (undated) DEVICE — MYOSURE SOCK

## (undated) DEVICE — S&N VERSAJET II EXACT HANDPIECE 8MM X 45 DEGREE

## (undated) DEVICE — ELCTR BOVIE PENCIL SMOKE EVACUATION

## (undated) DEVICE — PLV/PSP-ESU VALLEYLAB T7E14761DX: Type: DURABLE MEDICAL EQUIPMENT

## (undated) DEVICE — DRAPE INSTRUMENT POUCH 6.75" X 11"

## (undated) DEVICE — VENODYNE/SCD SLEEVE CALF MEDIUM

## (undated) DEVICE — SYR LUER LOK 10CC

## (undated) DEVICE — DRSG CURITY GAUZE SPONGE 4 X 4" 12-PLY

## (undated) DEVICE — MYOSURE SCOPE SEAL

## (undated) DEVICE — PLV/PSP-TOURNIQUET #4 EK059720: Type: DURABLE MEDICAL EQUIPMENT

## (undated) DEVICE — PACK LOWER EXTREMITY NS PLAINVI

## (undated) DEVICE — DRAPE MAYO STAND 23"

## (undated) DEVICE — SOL INJ LR 1000ML

## (undated) DEVICE — DRSG XEROFORM 1 X 8"

## (undated) DEVICE — TUBING SUCTION 20FT